# Patient Record
Sex: MALE | Race: WHITE | Employment: OTHER | URBAN - METROPOLITAN AREA
[De-identification: names, ages, dates, MRNs, and addresses within clinical notes are randomized per-mention and may not be internally consistent; named-entity substitution may affect disease eponyms.]

---

## 2017-01-10 DIAGNOSIS — E11.69 TYPE 2 DIABETES MELLITUS WITH OTHER SPECIFIED COMPLICATION (HCC): ICD-10-CM

## 2017-01-19 ENCOUNTER — TELEPHONE (OUTPATIENT)
Dept: PRIMARY CARE CLINIC | Age: 77
End: 2017-01-19

## 2017-01-20 DIAGNOSIS — D50.8 OTHER IRON DEFICIENCY ANEMIA: ICD-10-CM

## 2017-01-20 RX ORDER — LANOLIN ALCOHOL/MO/W.PET/CERES
325 CREAM (GRAM) TOPICAL
Qty: 90 TABLET | Refills: 4 | Status: SHIPPED | OUTPATIENT
Start: 2017-01-20 | End: 2018-01-25 | Stop reason: SDUPTHER

## 2017-02-14 ENCOUNTER — TELEPHONE (OUTPATIENT)
Dept: PRIMARY CARE CLINIC | Age: 77
End: 2017-02-14

## 2017-02-20 RX ORDER — ATORVASTATIN CALCIUM 80 MG/1
80 TABLET, FILM COATED ORAL DAILY
Qty: 30 TABLET | Refills: 3 | Status: SHIPPED | OUTPATIENT
Start: 2017-02-20 | End: 2017-03-01 | Stop reason: SDUPTHER

## 2017-02-27 DIAGNOSIS — I10 ESSENTIAL HYPERTENSION: ICD-10-CM

## 2017-02-28 RX ORDER — AMLODIPINE BESYLATE 2.5 MG/1
TABLET ORAL
Qty: 30 TABLET | Refills: 2 | Status: SHIPPED | OUTPATIENT
Start: 2017-02-28 | End: 2017-04-28

## 2017-03-01 ENCOUNTER — OFFICE VISIT (OUTPATIENT)
Dept: PRIMARY CARE CLINIC | Age: 77
End: 2017-03-01

## 2017-03-01 VITALS
OXYGEN SATURATION: 93 % | DIASTOLIC BLOOD PRESSURE: 65 MMHG | SYSTOLIC BLOOD PRESSURE: 119 MMHG | HEART RATE: 59 BPM | TEMPERATURE: 98 F | WEIGHT: 192 LBS | BODY MASS INDEX: 30.53 KG/M2

## 2017-03-01 DIAGNOSIS — E78.2 MIXED HYPERLIPIDEMIA: ICD-10-CM

## 2017-03-01 DIAGNOSIS — E11.69 TYPE 2 DIABETES MELLITUS WITH OTHER SPECIFIED COMPLICATION (HCC): Primary | ICD-10-CM

## 2017-03-01 DIAGNOSIS — I10 ESSENTIAL HYPERTENSION: ICD-10-CM

## 2017-03-01 DIAGNOSIS — I25.10 CORONARY ARTERY DISEASE INVOLVING NATIVE CORONARY ARTERY OF NATIVE HEART WITHOUT ANGINA PECTORIS: ICD-10-CM

## 2017-03-01 LAB — HBA1C MFR BLD: 8.3 %

## 2017-03-01 PROCEDURE — G8598 ASA/ANTIPLAT THER USED: HCPCS | Performed by: INTERNAL MEDICINE

## 2017-03-01 PROCEDURE — G8419 CALC BMI OUT NRM PARAM NOF/U: HCPCS | Performed by: INTERNAL MEDICINE

## 2017-03-01 PROCEDURE — 1123F ACP DISCUSS/DSCN MKR DOCD: CPT | Performed by: INTERNAL MEDICINE

## 2017-03-01 PROCEDURE — 4040F PNEUMOC VAC/ADMIN/RCVD: CPT | Performed by: INTERNAL MEDICINE

## 2017-03-01 PROCEDURE — 1036F TOBACCO NON-USER: CPT | Performed by: INTERNAL MEDICINE

## 2017-03-01 PROCEDURE — 83036 HEMOGLOBIN GLYCOSYLATED A1C: CPT | Performed by: INTERNAL MEDICINE

## 2017-03-01 PROCEDURE — G8427 DOCREV CUR MEDS BY ELIG CLIN: HCPCS | Performed by: INTERNAL MEDICINE

## 2017-03-01 PROCEDURE — 99214 OFFICE O/P EST MOD 30 MIN: CPT | Performed by: INTERNAL MEDICINE

## 2017-03-01 PROCEDURE — G8484 FLU IMMUNIZE NO ADMIN: HCPCS | Performed by: INTERNAL MEDICINE

## 2017-03-01 RX ORDER — METOPROLOL TARTRATE 50 MG/1
50 TABLET, FILM COATED ORAL 2 TIMES DAILY
Qty: 60 TABLET | Refills: 6 | Status: SHIPPED | OUTPATIENT
Start: 2017-03-01 | End: 2017-03-28 | Stop reason: SDUPTHER

## 2017-03-01 RX ORDER — LISINOPRIL 40 MG/1
40 TABLET ORAL DAILY
Qty: 30 TABLET | Refills: 6 | Status: SHIPPED | OUTPATIENT
Start: 2017-03-01 | End: 2017-03-28 | Stop reason: SDUPTHER

## 2017-03-01 RX ORDER — FUROSEMIDE 20 MG/1
20 TABLET ORAL SEE ADMIN INSTRUCTIONS
Qty: 90 TABLET | Refills: 3 | Status: SHIPPED | OUTPATIENT
Start: 2017-03-01 | End: 2017-03-28 | Stop reason: SDUPTHER

## 2017-03-01 RX ORDER — ATORVASTATIN CALCIUM 80 MG/1
80 TABLET, FILM COATED ORAL DAILY
Qty: 30 TABLET | Refills: 5 | Status: SHIPPED | OUTPATIENT
Start: 2017-03-01 | End: 2017-04-28 | Stop reason: SDUPTHER

## 2017-03-01 RX ORDER — ASPIRIN 81 MG/1
81 TABLET ORAL DAILY
Qty: 90 TABLET | Refills: 11 | Status: SHIPPED | OUTPATIENT
Start: 2017-03-01 | End: 2017-04-28 | Stop reason: SDUPTHER

## 2017-03-01 ASSESSMENT — ENCOUNTER SYMPTOMS
WHEEZING: 0
CHEST TIGHTNESS: 0
EYES NEGATIVE: 1
SHORTNESS OF BREATH: 0
COUGH: 0

## 2017-03-01 ASSESSMENT — PATIENT HEALTH QUESTIONNAIRE - PHQ9
SUM OF ALL RESPONSES TO PHQ QUESTIONS 1-9: 0
2. FEELING DOWN, DEPRESSED OR HOPELESS: 0
SUM OF ALL RESPONSES TO PHQ9 QUESTIONS 1 & 2: 0
1. LITTLE INTEREST OR PLEASURE IN DOING THINGS: 0

## 2017-03-28 ENCOUNTER — OFFICE VISIT (OUTPATIENT)
Dept: PRIMARY CARE CLINIC | Age: 77
End: 2017-03-28

## 2017-03-28 VITALS
OXYGEN SATURATION: 95 % | BODY MASS INDEX: 30.37 KG/M2 | DIASTOLIC BLOOD PRESSURE: 65 MMHG | WEIGHT: 191 LBS | SYSTOLIC BLOOD PRESSURE: 155 MMHG | HEART RATE: 64 BPM

## 2017-03-28 DIAGNOSIS — I10 ESSENTIAL HYPERTENSION: ICD-10-CM

## 2017-03-28 PROCEDURE — 99214 OFFICE O/P EST MOD 30 MIN: CPT | Performed by: INTERNAL MEDICINE

## 2017-03-28 PROCEDURE — 4040F PNEUMOC VAC/ADMIN/RCVD: CPT | Performed by: INTERNAL MEDICINE

## 2017-03-28 PROCEDURE — G8484 FLU IMMUNIZE NO ADMIN: HCPCS | Performed by: INTERNAL MEDICINE

## 2017-03-28 PROCEDURE — G8427 DOCREV CUR MEDS BY ELIG CLIN: HCPCS | Performed by: INTERNAL MEDICINE

## 2017-03-28 PROCEDURE — 1036F TOBACCO NON-USER: CPT | Performed by: INTERNAL MEDICINE

## 2017-03-28 PROCEDURE — G8419 CALC BMI OUT NRM PARAM NOF/U: HCPCS | Performed by: INTERNAL MEDICINE

## 2017-03-28 PROCEDURE — 1123F ACP DISCUSS/DSCN MKR DOCD: CPT | Performed by: INTERNAL MEDICINE

## 2017-03-28 PROCEDURE — G8598 ASA/ANTIPLAT THER USED: HCPCS | Performed by: INTERNAL MEDICINE

## 2017-03-28 RX ORDER — FUROSEMIDE 20 MG/1
20 TABLET ORAL SEE ADMIN INSTRUCTIONS
Qty: 90 TABLET | Refills: 3 | Status: SHIPPED | OUTPATIENT
Start: 2017-03-28 | End: 2017-04-28 | Stop reason: SDUPTHER

## 2017-03-28 RX ORDER — AMLODIPINE BESYLATE 2.5 MG/1
2.5 TABLET ORAL DAILY
Qty: 30 TABLET | Refills: 3 | Status: SHIPPED | OUTPATIENT
Start: 2017-03-28 | End: 2017-04-28 | Stop reason: SDUPTHER

## 2017-03-28 RX ORDER — METOPROLOL TARTRATE 50 MG/1
50 TABLET, FILM COATED ORAL 2 TIMES DAILY
Qty: 60 TABLET | Refills: 6 | Status: SHIPPED | OUTPATIENT
Start: 2017-03-28 | End: 2017-04-28 | Stop reason: SDUPTHER

## 2017-03-28 RX ORDER — LISINOPRIL 40 MG/1
40 TABLET ORAL DAILY
Qty: 30 TABLET | Refills: 6 | Status: SHIPPED | OUTPATIENT
Start: 2017-03-28 | End: 2017-04-28 | Stop reason: SDUPTHER

## 2017-03-28 ASSESSMENT — ENCOUNTER SYMPTOMS
EYES NEGATIVE: 1
COUGH: 0
CHEST TIGHTNESS: 0
SHORTNESS OF BREATH: 0
WHEEZING: 0

## 2017-03-31 DIAGNOSIS — I25.10 CORONARY ARTERY DISEASE INVOLVING NATIVE CORONARY ARTERY OF NATIVE HEART WITHOUT ANGINA PECTORIS: ICD-10-CM

## 2017-04-03 RX ORDER — ASPIRIN 81 MG/1
TABLET ORAL
Qty: 90 TABLET | Refills: 11 | OUTPATIENT
Start: 2017-04-03

## 2017-04-28 ENCOUNTER — OFFICE VISIT (OUTPATIENT)
Dept: PRIMARY CARE CLINIC | Age: 77
End: 2017-04-28

## 2017-04-28 VITALS
OXYGEN SATURATION: 95 % | WEIGHT: 193 LBS | HEART RATE: 57 BPM | SYSTOLIC BLOOD PRESSURE: 130 MMHG | DIASTOLIC BLOOD PRESSURE: 70 MMHG | BODY MASS INDEX: 30.68 KG/M2

## 2017-04-28 DIAGNOSIS — Z23 NEED FOR TDAP VACCINATION: ICD-10-CM

## 2017-04-28 DIAGNOSIS — I25.10 CORONARY ARTERY DISEASE INVOLVING NATIVE CORONARY ARTERY OF NATIVE HEART WITHOUT ANGINA PECTORIS: ICD-10-CM

## 2017-04-28 DIAGNOSIS — E78.2 MIXED HYPERLIPIDEMIA: ICD-10-CM

## 2017-04-28 DIAGNOSIS — I10 ESSENTIAL HYPERTENSION: ICD-10-CM

## 2017-04-28 DIAGNOSIS — E11.69 TYPE 2 DIABETES MELLITUS WITH OTHER SPECIFIED COMPLICATION (HCC): Primary | ICD-10-CM

## 2017-04-28 PROCEDURE — 90715 TDAP VACCINE 7 YRS/> IM: CPT | Performed by: INTERNAL MEDICINE

## 2017-04-28 PROCEDURE — 1123F ACP DISCUSS/DSCN MKR DOCD: CPT | Performed by: INTERNAL MEDICINE

## 2017-04-28 PROCEDURE — G8598 ASA/ANTIPLAT THER USED: HCPCS | Performed by: INTERNAL MEDICINE

## 2017-04-28 PROCEDURE — G8427 DOCREV CUR MEDS BY ELIG CLIN: HCPCS | Performed by: INTERNAL MEDICINE

## 2017-04-28 PROCEDURE — 90471 IMMUNIZATION ADMIN: CPT | Performed by: INTERNAL MEDICINE

## 2017-04-28 PROCEDURE — 1036F TOBACCO NON-USER: CPT | Performed by: INTERNAL MEDICINE

## 2017-04-28 PROCEDURE — G8419 CALC BMI OUT NRM PARAM NOF/U: HCPCS | Performed by: INTERNAL MEDICINE

## 2017-04-28 PROCEDURE — 99214 OFFICE O/P EST MOD 30 MIN: CPT | Performed by: INTERNAL MEDICINE

## 2017-04-28 PROCEDURE — 4040F PNEUMOC VAC/ADMIN/RCVD: CPT | Performed by: INTERNAL MEDICINE

## 2017-04-28 RX ORDER — ATORVASTATIN CALCIUM 80 MG/1
80 TABLET, FILM COATED ORAL DAILY
Qty: 30 TABLET | Refills: 5 | Status: SHIPPED | OUTPATIENT
Start: 2017-04-28 | End: 2017-05-30 | Stop reason: SDUPTHER

## 2017-04-28 RX ORDER — AMLODIPINE BESYLATE 2.5 MG/1
2.5 TABLET ORAL DAILY
Qty: 30 TABLET | Refills: 3 | Status: SHIPPED | OUTPATIENT
Start: 2017-04-28 | End: 2017-05-30 | Stop reason: SDUPTHER

## 2017-04-28 RX ORDER — METOPROLOL TARTRATE 50 MG/1
50 TABLET, FILM COATED ORAL 2 TIMES DAILY
Qty: 60 TABLET | Refills: 6 | Status: SHIPPED | OUTPATIENT
Start: 2017-04-28 | End: 2017-05-30 | Stop reason: SDUPTHER

## 2017-04-28 RX ORDER — LISINOPRIL 40 MG/1
40 TABLET ORAL DAILY
Qty: 30 TABLET | Refills: 6 | Status: SHIPPED | OUTPATIENT
Start: 2017-04-28 | End: 2017-05-30 | Stop reason: SDUPTHER

## 2017-04-28 RX ORDER — FUROSEMIDE 20 MG/1
20 TABLET ORAL SEE ADMIN INSTRUCTIONS
Qty: 90 TABLET | Refills: 3 | Status: SHIPPED | OUTPATIENT
Start: 2017-04-28 | End: 2017-05-30 | Stop reason: SDUPTHER

## 2017-04-28 RX ORDER — ASPIRIN 81 MG/1
81 TABLET ORAL DAILY
Qty: 90 TABLET | Refills: 11 | Status: SHIPPED | OUTPATIENT
Start: 2017-04-28 | End: 2017-05-30 | Stop reason: SDUPTHER

## 2017-04-28 ASSESSMENT — ENCOUNTER SYMPTOMS
SHORTNESS OF BREATH: 0
WHEEZING: 0
CHEST TIGHTNESS: 0
COUGH: 0
EYES NEGATIVE: 1

## 2017-05-30 ENCOUNTER — OFFICE VISIT (OUTPATIENT)
Dept: PRIMARY CARE CLINIC | Age: 77
End: 2017-05-30

## 2017-05-30 VITALS
SYSTOLIC BLOOD PRESSURE: 120 MMHG | HEIGHT: 67 IN | DIASTOLIC BLOOD PRESSURE: 70 MMHG | WEIGHT: 192 LBS | BODY MASS INDEX: 30.13 KG/M2 | RESPIRATION RATE: 16 BRPM | TEMPERATURE: 98 F | HEART RATE: 60 BPM | OXYGEN SATURATION: 97 %

## 2017-05-30 DIAGNOSIS — I25.10 CORONARY ARTERY DISEASE INVOLVING NATIVE CORONARY ARTERY OF NATIVE HEART WITHOUT ANGINA PECTORIS: ICD-10-CM

## 2017-05-30 DIAGNOSIS — E11.69 TYPE 2 DIABETES MELLITUS WITH OTHER SPECIFIED COMPLICATION (HCC): Primary | ICD-10-CM

## 2017-05-30 DIAGNOSIS — E78.2 MIXED HYPERLIPIDEMIA: ICD-10-CM

## 2017-05-30 DIAGNOSIS — D64.9 ANEMIA, UNSPECIFIED TYPE: ICD-10-CM

## 2017-05-30 DIAGNOSIS — I10 ESSENTIAL HYPERTENSION: ICD-10-CM

## 2017-05-30 LAB — HBA1C MFR BLD: 8.2 %

## 2017-05-30 PROCEDURE — G8427 DOCREV CUR MEDS BY ELIG CLIN: HCPCS | Performed by: INTERNAL MEDICINE

## 2017-05-30 PROCEDURE — 1123F ACP DISCUSS/DSCN MKR DOCD: CPT | Performed by: INTERNAL MEDICINE

## 2017-05-30 PROCEDURE — 83036 HEMOGLOBIN GLYCOSYLATED A1C: CPT | Performed by: INTERNAL MEDICINE

## 2017-05-30 PROCEDURE — G8417 CALC BMI ABV UP PARAM F/U: HCPCS | Performed by: INTERNAL MEDICINE

## 2017-05-30 PROCEDURE — 99214 OFFICE O/P EST MOD 30 MIN: CPT | Performed by: INTERNAL MEDICINE

## 2017-05-30 PROCEDURE — 1036F TOBACCO NON-USER: CPT | Performed by: INTERNAL MEDICINE

## 2017-05-30 PROCEDURE — G8598 ASA/ANTIPLAT THER USED: HCPCS | Performed by: INTERNAL MEDICINE

## 2017-05-30 PROCEDURE — 4040F PNEUMOC VAC/ADMIN/RCVD: CPT | Performed by: INTERNAL MEDICINE

## 2017-05-30 RX ORDER — ATORVASTATIN CALCIUM 80 MG/1
80 TABLET, FILM COATED ORAL DAILY
Qty: 30 TABLET | Refills: 6 | Status: SHIPPED | OUTPATIENT
Start: 2017-05-30 | End: 2017-09-14 | Stop reason: SDUPTHER

## 2017-05-30 RX ORDER — METOPROLOL TARTRATE 50 MG/1
50 TABLET, FILM COATED ORAL 2 TIMES DAILY
Qty: 60 TABLET | Refills: 6 | Status: SHIPPED | OUTPATIENT
Start: 2017-05-30 | End: 2017-09-14 | Stop reason: SDUPTHER

## 2017-05-30 RX ORDER — FUROSEMIDE 20 MG/1
20 TABLET ORAL SEE ADMIN INSTRUCTIONS
Qty: 90 TABLET | Refills: 3 | Status: SHIPPED | OUTPATIENT
Start: 2017-05-30 | End: 2017-09-14 | Stop reason: SDUPTHER

## 2017-05-30 RX ORDER — AMLODIPINE BESYLATE 2.5 MG/1
2.5 TABLET ORAL DAILY
Qty: 30 TABLET | Refills: 3 | Status: SHIPPED | OUTPATIENT
Start: 2017-05-30 | End: 2017-09-14 | Stop reason: SDUPTHER

## 2017-05-30 RX ORDER — ASPIRIN 81 MG/1
81 TABLET ORAL DAILY
Qty: 90 TABLET | Refills: 11 | Status: SHIPPED | OUTPATIENT
Start: 2017-05-30 | End: 2017-09-14 | Stop reason: SDUPTHER

## 2017-05-30 RX ORDER — LISINOPRIL 40 MG/1
40 TABLET ORAL DAILY
Qty: 30 TABLET | Refills: 6 | Status: SHIPPED | OUTPATIENT
Start: 2017-05-30 | End: 2017-09-14 | Stop reason: SDUPTHER

## 2017-05-30 ASSESSMENT — ENCOUNTER SYMPTOMS
CHEST TIGHTNESS: 0
SHORTNESS OF BREATH: 0
WHEEZING: 0
COUGH: 0
EYES NEGATIVE: 1

## 2017-06-05 DIAGNOSIS — E11.69 TYPE 2 DIABETES MELLITUS WITH OTHER SPECIFIED COMPLICATION (HCC): ICD-10-CM

## 2017-06-05 DIAGNOSIS — E78.2 MIXED HYPERLIPIDEMIA: ICD-10-CM

## 2017-06-05 LAB
A/G RATIO: 1.1 (ref 1.1–2.2)
ALBUMIN SERPL-MCNC: 3.9 G/DL (ref 3.4–5)
ALP BLD-CCNC: 69 U/L (ref 40–129)
ALT SERPL-CCNC: 13 U/L (ref 10–40)
ANION GAP SERPL CALCULATED.3IONS-SCNC: 13 MMOL/L (ref 3–16)
AST SERPL-CCNC: 15 U/L (ref 15–37)
BASOPHILS ABSOLUTE: 0.1 K/UL (ref 0–0.2)
BASOPHILS RELATIVE PERCENT: 1.1 %
BILIRUB SERPL-MCNC: 0.3 MG/DL (ref 0–1)
BILIRUBIN URINE: ABNORMAL
BLOOD, URINE: NEGATIVE
BUN BLDV-MCNC: 27 MG/DL (ref 7–20)
CALCIUM SERPL-MCNC: 9.3 MG/DL (ref 8.3–10.6)
CHLORIDE BLD-SCNC: 99 MMOL/L (ref 99–110)
CHOLESTEROL, TOTAL: 138 MG/DL (ref 0–199)
CLARITY: CLEAR
CO2: 31 MMOL/L (ref 21–32)
COLOR: YELLOW
CREAT SERPL-MCNC: 1.4 MG/DL (ref 0.8–1.3)
EOSINOPHILS ABSOLUTE: 0.4 K/UL (ref 0–0.6)
EOSINOPHILS RELATIVE PERCENT: 3 %
EPITHELIAL CELLS, UA: 0 /HPF (ref 0–5)
GFR AFRICAN AMERICAN: 59
GFR NON-AFRICAN AMERICAN: 49
GLOBULIN: 3.4 G/DL
GLUCOSE BLD-MCNC: 134 MG/DL (ref 70–99)
GLUCOSE URINE: NEGATIVE MG/DL
HCT VFR BLD CALC: 44.4 % (ref 40.5–52.5)
HDLC SERPL-MCNC: 40 MG/DL (ref 40–60)
HEMOGLOBIN: 13.8 G/DL (ref 13.5–17.5)
HYALINE CASTS: 0 /LPF (ref 0–8)
KETONES, URINE: ABNORMAL MG/DL
LDL CHOLESTEROL CALCULATED: 77 MG/DL
LEUKOCYTE ESTERASE, URINE: NEGATIVE
LYMPHOCYTES ABSOLUTE: 1.7 K/UL (ref 1–5.1)
LYMPHOCYTES RELATIVE PERCENT: 13.4 %
MCH RBC QN AUTO: 26.8 PG (ref 26–34)
MCHC RBC AUTO-ENTMCNC: 31.1 G/DL (ref 31–36)
MCV RBC AUTO: 86.2 FL (ref 80–100)
MICROSCOPIC EXAMINATION: YES
MONOCYTES ABSOLUTE: 1 K/UL (ref 0–1.3)
MONOCYTES RELATIVE PERCENT: 7.6 %
NEUTROPHILS ABSOLUTE: 9.4 K/UL (ref 1.7–7.7)
NEUTROPHILS RELATIVE PERCENT: 74.9 %
NITRITE, URINE: NEGATIVE
PDW BLD-RTO: 16.2 % (ref 12.4–15.4)
PH UA: 7
PLATELET # BLD: 221 K/UL (ref 135–450)
PMV BLD AUTO: 9.1 FL (ref 5–10.5)
POTASSIUM SERPL-SCNC: 4.3 MMOL/L (ref 3.5–5.1)
PROTEIN UA: 100 MG/DL
RBC # BLD: 5.16 M/UL (ref 4.2–5.9)
RBC UA: 3 /HPF (ref 0–4)
SODIUM BLD-SCNC: 143 MMOL/L (ref 136–145)
SPECIFIC GRAVITY UA: 1.02
TOTAL PROTEIN: 7.3 G/DL (ref 6.4–8.2)
TRIGL SERPL-MCNC: 103 MG/DL (ref 0–150)
TSH SERPL DL<=0.05 MIU/L-ACNC: 2.35 UIU/ML (ref 0.27–4.2)
URINE TYPE: ABNORMAL
UROBILINOGEN, URINE: 1 E.U./DL
VLDLC SERPL CALC-MCNC: 21 MG/DL
WBC # BLD: 12.6 K/UL (ref 4–11)
WBC UA: 1 /HPF (ref 0–5)

## 2017-08-08 DIAGNOSIS — E11.69 TYPE 2 DIABETES MELLITUS WITH OTHER SPECIFIED COMPLICATION (HCC): ICD-10-CM

## 2017-09-14 ENCOUNTER — OFFICE VISIT (OUTPATIENT)
Dept: PRIMARY CARE CLINIC | Age: 77
End: 2017-09-14

## 2017-09-14 VITALS
TEMPERATURE: 98.5 F | OXYGEN SATURATION: 95 % | HEIGHT: 67 IN | RESPIRATION RATE: 16 BRPM | DIASTOLIC BLOOD PRESSURE: 65 MMHG | SYSTOLIC BLOOD PRESSURE: 125 MMHG | WEIGHT: 190 LBS | HEART RATE: 61 BPM | BODY MASS INDEX: 29.82 KG/M2

## 2017-09-14 DIAGNOSIS — I10 ESSENTIAL HYPERTENSION: ICD-10-CM

## 2017-09-14 DIAGNOSIS — I25.10 CORONARY ARTERY DISEASE INVOLVING NATIVE CORONARY ARTERY OF NATIVE HEART WITHOUT ANGINA PECTORIS: ICD-10-CM

## 2017-09-14 DIAGNOSIS — M15.9 GENERALIZED OA: ICD-10-CM

## 2017-09-14 DIAGNOSIS — E11.69 TYPE 2 DIABETES MELLITUS WITH OTHER SPECIFIED COMPLICATION (HCC): Primary | ICD-10-CM

## 2017-09-14 DIAGNOSIS — E78.2 MIXED HYPERLIPIDEMIA: ICD-10-CM

## 2017-09-14 DIAGNOSIS — Z23 FLU VACCINE NEED: ICD-10-CM

## 2017-09-14 LAB — HBA1C MFR BLD: 8 %

## 2017-09-14 PROCEDURE — 83036 HEMOGLOBIN GLYCOSYLATED A1C: CPT | Performed by: INTERNAL MEDICINE

## 2017-09-14 PROCEDURE — G8417 CALC BMI ABV UP PARAM F/U: HCPCS | Performed by: INTERNAL MEDICINE

## 2017-09-14 PROCEDURE — 1123F ACP DISCUSS/DSCN MKR DOCD: CPT | Performed by: INTERNAL MEDICINE

## 2017-09-14 PROCEDURE — 1036F TOBACCO NON-USER: CPT | Performed by: INTERNAL MEDICINE

## 2017-09-14 PROCEDURE — 4040F PNEUMOC VAC/ADMIN/RCVD: CPT | Performed by: INTERNAL MEDICINE

## 2017-09-14 PROCEDURE — 90662 IIV NO PRSV INCREASED AG IM: CPT | Performed by: INTERNAL MEDICINE

## 2017-09-14 PROCEDURE — G8598 ASA/ANTIPLAT THER USED: HCPCS | Performed by: INTERNAL MEDICINE

## 2017-09-14 PROCEDURE — G0008 ADMIN INFLUENZA VIRUS VAC: HCPCS | Performed by: INTERNAL MEDICINE

## 2017-09-14 PROCEDURE — G8427 DOCREV CUR MEDS BY ELIG CLIN: HCPCS | Performed by: INTERNAL MEDICINE

## 2017-09-14 PROCEDURE — 99214 OFFICE O/P EST MOD 30 MIN: CPT | Performed by: INTERNAL MEDICINE

## 2017-09-14 RX ORDER — FUROSEMIDE 20 MG/1
20 TABLET ORAL SEE ADMIN INSTRUCTIONS
Qty: 90 TABLET | Refills: 3 | Status: SHIPPED | OUTPATIENT
Start: 2017-09-14 | End: 2017-12-14 | Stop reason: SDUPTHER

## 2017-09-14 RX ORDER — ACETAMINOPHEN 500 MG
500 TABLET ORAL EVERY 6 HOURS PRN
Qty: 90 TABLET | Refills: 3 | Status: SHIPPED | OUTPATIENT
Start: 2017-09-14 | End: 2018-02-18 | Stop reason: SDUPTHER

## 2017-09-14 RX ORDER — METOPROLOL TARTRATE 50 MG/1
50 TABLET, FILM COATED ORAL 2 TIMES DAILY
Qty: 60 TABLET | Refills: 6 | Status: SHIPPED | OUTPATIENT
Start: 2017-09-14 | End: 2017-12-14 | Stop reason: SDUPTHER

## 2017-09-14 RX ORDER — ATORVASTATIN CALCIUM 80 MG/1
80 TABLET, FILM COATED ORAL DAILY
Qty: 30 TABLET | Refills: 6 | Status: SHIPPED | OUTPATIENT
Start: 2017-09-14 | End: 2017-12-14 | Stop reason: SDUPTHER

## 2017-09-14 RX ORDER — LISINOPRIL 40 MG/1
40 TABLET ORAL DAILY
Qty: 30 TABLET | Refills: 6 | Status: SHIPPED | OUTPATIENT
Start: 2017-09-14 | End: 2017-12-14 | Stop reason: SDUPTHER

## 2017-09-14 RX ORDER — ASPIRIN 81 MG/1
81 TABLET ORAL DAILY
Qty: 90 TABLET | Refills: 11 | Status: SHIPPED | OUTPATIENT
Start: 2017-09-14 | End: 2017-12-14 | Stop reason: SDUPTHER

## 2017-09-14 RX ORDER — AMLODIPINE BESYLATE 2.5 MG/1
2.5 TABLET ORAL DAILY
Qty: 30 TABLET | Refills: 5 | Status: SHIPPED | OUTPATIENT
Start: 2017-09-14 | End: 2017-12-14 | Stop reason: SDUPTHER

## 2017-09-14 ASSESSMENT — ENCOUNTER SYMPTOMS
CHEST TIGHTNESS: 0
COUGH: 0
BACK PAIN: 1
SHORTNESS OF BREATH: 0
WHEEZING: 0
EYES NEGATIVE: 1

## 2017-09-26 RX ORDER — PEN NEEDLE, DIABETIC 31 GX5/16"
NEEDLE, DISPOSABLE MISCELLANEOUS
Qty: 100 EACH | Refills: 12 | Status: SHIPPED | OUTPATIENT
Start: 2017-09-26 | End: 2017-11-22 | Stop reason: SDUPTHER

## 2017-12-14 ENCOUNTER — OFFICE VISIT (OUTPATIENT)
Dept: PRIMARY CARE CLINIC | Age: 77
End: 2017-12-14

## 2017-12-14 VITALS
DIASTOLIC BLOOD PRESSURE: 65 MMHG | HEART RATE: 57 BPM | OXYGEN SATURATION: 100 % | HEIGHT: 67 IN | TEMPERATURE: 97.3 F | SYSTOLIC BLOOD PRESSURE: 135 MMHG | BODY MASS INDEX: 29.98 KG/M2 | WEIGHT: 191 LBS

## 2017-12-14 DIAGNOSIS — B35.1 ONYCHOMYCOSIS: ICD-10-CM

## 2017-12-14 DIAGNOSIS — E78.2 MIXED HYPERLIPIDEMIA: ICD-10-CM

## 2017-12-14 DIAGNOSIS — E11.9 TYPE 2 DIABETES MELLITUS WITHOUT COMPLICATION, WITH LONG-TERM CURRENT USE OF INSULIN (HCC): Primary | ICD-10-CM

## 2017-12-14 DIAGNOSIS — Z79.4 TYPE 2 DIABETES MELLITUS WITHOUT COMPLICATION, WITH LONG-TERM CURRENT USE OF INSULIN (HCC): Primary | ICD-10-CM

## 2017-12-14 DIAGNOSIS — I25.10 CORONARY ARTERY DISEASE INVOLVING NATIVE CORONARY ARTERY OF NATIVE HEART WITHOUT ANGINA PECTORIS: ICD-10-CM

## 2017-12-14 DIAGNOSIS — I10 ESSENTIAL HYPERTENSION: ICD-10-CM

## 2017-12-14 LAB — HBA1C MFR BLD: 7.1 %

## 2017-12-14 PROCEDURE — 4040F PNEUMOC VAC/ADMIN/RCVD: CPT | Performed by: INTERNAL MEDICINE

## 2017-12-14 PROCEDURE — 1036F TOBACCO NON-USER: CPT | Performed by: INTERNAL MEDICINE

## 2017-12-14 PROCEDURE — 1123F ACP DISCUSS/DSCN MKR DOCD: CPT | Performed by: INTERNAL MEDICINE

## 2017-12-14 PROCEDURE — G8417 CALC BMI ABV UP PARAM F/U: HCPCS | Performed by: INTERNAL MEDICINE

## 2017-12-14 PROCEDURE — G8598 ASA/ANTIPLAT THER USED: HCPCS | Performed by: INTERNAL MEDICINE

## 2017-12-14 PROCEDURE — G8427 DOCREV CUR MEDS BY ELIG CLIN: HCPCS | Performed by: INTERNAL MEDICINE

## 2017-12-14 PROCEDURE — 83036 HEMOGLOBIN GLYCOSYLATED A1C: CPT | Performed by: INTERNAL MEDICINE

## 2017-12-14 PROCEDURE — G8484 FLU IMMUNIZE NO ADMIN: HCPCS | Performed by: INTERNAL MEDICINE

## 2017-12-14 PROCEDURE — 99214 OFFICE O/P EST MOD 30 MIN: CPT | Performed by: INTERNAL MEDICINE

## 2017-12-14 RX ORDER — FUROSEMIDE 20 MG/1
20 TABLET ORAL SEE ADMIN INSTRUCTIONS
Qty: 90 TABLET | Refills: 3 | Status: SHIPPED | OUTPATIENT
Start: 2017-12-14 | End: 2018-07-17 | Stop reason: SDUPTHER

## 2017-12-14 RX ORDER — ATORVASTATIN CALCIUM 80 MG/1
80 TABLET, FILM COATED ORAL DAILY
Qty: 30 TABLET | Refills: 6 | Status: SHIPPED | OUTPATIENT
Start: 2017-12-14 | End: 2018-07-17 | Stop reason: SDUPTHER

## 2017-12-14 RX ORDER — AMLODIPINE BESYLATE 2.5 MG/1
2.5 TABLET ORAL DAILY
Qty: 30 TABLET | Refills: 5 | Status: SHIPPED | OUTPATIENT
Start: 2017-12-14 | End: 2018-10-17

## 2017-12-14 RX ORDER — METOPROLOL TARTRATE 50 MG/1
50 TABLET, FILM COATED ORAL 2 TIMES DAILY
Qty: 60 TABLET | Refills: 6 | Status: SHIPPED | OUTPATIENT
Start: 2017-12-14 | End: 2018-07-17 | Stop reason: SDUPTHER

## 2017-12-14 RX ORDER — LISINOPRIL 40 MG/1
40 TABLET ORAL DAILY
Qty: 30 TABLET | Refills: 6 | Status: SHIPPED | OUTPATIENT
Start: 2017-12-14 | End: 2018-07-17 | Stop reason: SDUPTHER

## 2017-12-14 RX ORDER — ASPIRIN 81 MG/1
81 TABLET ORAL DAILY
Qty: 90 TABLET | Refills: 11 | Status: SHIPPED | OUTPATIENT
Start: 2017-12-14 | End: 2018-07-17 | Stop reason: SDUPTHER

## 2017-12-14 ASSESSMENT — ENCOUNTER SYMPTOMS
CHEST TIGHTNESS: 0
COUGH: 0
WHEEZING: 0
EYES NEGATIVE: 1
SHORTNESS OF BREATH: 0

## 2017-12-14 NOTE — PROGRESS NOTES
Subjective:      Patient ID: Immanuel Pacheco is a 65 y.o. male. 12/14/17 Patient presents with:  Diabetes  No low sugars! Takes pm snack reg   Hypertension  No CP / SOB               Last seen  9/14/17       Diabetes   He presents for his follow-up diabetic visit. He has type 2 diabetes mellitus. His disease course has been fluctuating. Pertinent negatives for hypoglycemia include no headaches or tremors. Pertinent negatives for diabetes include no chest pain, no fatigue and no weakness. Eye exam is current. Hypertension   This is a chronic problem. The problem has been waxing and waning since onset. Associated symptoms include anxiety. Pertinent negatives include no chest pain, headaches, palpitations or shortness of breath. Review of Systems   Constitutional: Negative for chills, fatigue and fever. Pneumovac 1/12; Prevnar 1/16   Flu vac 9/17  Zostavac/ script   Tdap 4/17   HENT:        Upper Dentures   Eyes: Negative. Eye exam  4/17   Respiratory: Negative for cough, chest tightness, shortness of breath and wheezing. Does not smoke ; No Etoh    Cardiovascular: Negative. Negative for chest pain and palpitations. Known CAD ; S/P CABG   Gastrointestinal:        Colonoscopy 12/2010 . Polyps . C/o Dr Joie Gil   Endocrine:         Diabetic    Genitourinary: Negative for dysuria. Musculoskeletal: Positive for arthralgias. Skin: Negative for rash. Allergic/Immunologic: Negative for food allergies. Neurological: Negative for tremors, weakness and headaches. Psychiatric/Behavioral: Negative for sleep disturbance. Objective:   Physical Exam   Constitutional: He is oriented to person, place, and time. He appears well-nourished. No distress. Eyes: Conjunctivae are normal.   Neck: Neck supple. Cardiovascular: Normal rate, regular rhythm and normal heart sounds. Pulmonary/Chest: Breath sounds normal. He has no wheezes. He has no rales.    Abdominal: He exhibits

## 2018-01-25 DIAGNOSIS — D50.8 OTHER IRON DEFICIENCY ANEMIA: ICD-10-CM

## 2018-01-25 RX ORDER — FERROUS SULFATE 325(65) MG
325 TABLET ORAL
Qty: 90 TABLET | Refills: 3 | Status: SHIPPED | OUTPATIENT
Start: 2018-01-25 | End: 2018-10-17 | Stop reason: SDUPTHER

## 2018-02-18 DIAGNOSIS — M15.9 GENERALIZED OA: ICD-10-CM

## 2018-02-20 RX ORDER — GLYCERIN ADULT
500 SUPPOSITORY, RECTAL RECTAL EVERY 6 HOURS PRN
Qty: 90 TABLET | Refills: 3 | Status: SHIPPED | OUTPATIENT
Start: 2018-02-20 | End: 2018-06-25 | Stop reason: SDUPTHER

## 2018-04-17 ENCOUNTER — OFFICE VISIT (OUTPATIENT)
Dept: PRIMARY CARE CLINIC | Age: 78
End: 2018-04-17

## 2018-04-17 VITALS
HEART RATE: 60 BPM | DIASTOLIC BLOOD PRESSURE: 60 MMHG | OXYGEN SATURATION: 98 % | TEMPERATURE: 98.5 F | WEIGHT: 192 LBS | BODY MASS INDEX: 30.13 KG/M2 | SYSTOLIC BLOOD PRESSURE: 130 MMHG | HEIGHT: 67 IN

## 2018-04-17 DIAGNOSIS — I10 ESSENTIAL HYPERTENSION: ICD-10-CM

## 2018-04-17 DIAGNOSIS — E11.9 TYPE 2 DIABETES MELLITUS WITHOUT COMPLICATION, WITH LONG-TERM CURRENT USE OF INSULIN (HCC): Primary | ICD-10-CM

## 2018-04-17 DIAGNOSIS — Z79.4 TYPE 2 DIABETES MELLITUS WITHOUT COMPLICATION, WITH LONG-TERM CURRENT USE OF INSULIN (HCC): Primary | ICD-10-CM

## 2018-04-17 DIAGNOSIS — Z23 NEED FOR SHINGLES VACCINE: ICD-10-CM

## 2018-04-17 LAB — HBA1C MFR BLD: 7.3 %

## 2018-04-17 PROCEDURE — G8417 CALC BMI ABV UP PARAM F/U: HCPCS | Performed by: INTERNAL MEDICINE

## 2018-04-17 PROCEDURE — G8427 DOCREV CUR MEDS BY ELIG CLIN: HCPCS | Performed by: INTERNAL MEDICINE

## 2018-04-17 PROCEDURE — 99214 OFFICE O/P EST MOD 30 MIN: CPT | Performed by: INTERNAL MEDICINE

## 2018-04-17 PROCEDURE — 83036 HEMOGLOBIN GLYCOSYLATED A1C: CPT | Performed by: INTERNAL MEDICINE

## 2018-04-17 PROCEDURE — 4040F PNEUMOC VAC/ADMIN/RCVD: CPT | Performed by: INTERNAL MEDICINE

## 2018-04-17 PROCEDURE — 1123F ACP DISCUSS/DSCN MKR DOCD: CPT | Performed by: INTERNAL MEDICINE

## 2018-04-17 PROCEDURE — 1036F TOBACCO NON-USER: CPT | Performed by: INTERNAL MEDICINE

## 2018-04-17 PROCEDURE — G8598 ASA/ANTIPLAT THER USED: HCPCS | Performed by: INTERNAL MEDICINE

## 2018-04-17 ASSESSMENT — ENCOUNTER SYMPTOMS
CHEST TIGHTNESS: 0
EYES NEGATIVE: 1
COUGH: 0
SHORTNESS OF BREATH: 0
WHEEZING: 0

## 2018-04-18 DIAGNOSIS — I10 ESSENTIAL HYPERTENSION: ICD-10-CM

## 2018-04-18 RX ORDER — AMLODIPINE BESYLATE 2.5 MG/1
2.5 TABLET ORAL DAILY
Qty: 30 TABLET | Refills: 5 | Status: SHIPPED | OUTPATIENT
Start: 2018-04-18 | End: 2018-07-17 | Stop reason: SDUPTHER

## 2018-06-25 DIAGNOSIS — M15.9 GENERALIZED OA: ICD-10-CM

## 2018-06-25 RX ORDER — GLYCERIN ADULT
500 SUPPOSITORY, RECTAL RECTAL EVERY 6 HOURS PRN
Qty: 90 TABLET | Refills: 3 | Status: SHIPPED | OUTPATIENT
Start: 2018-06-25 | End: 2018-09-21 | Stop reason: SDUPTHER

## 2018-07-17 ENCOUNTER — OFFICE VISIT (OUTPATIENT)
Dept: PRIMARY CARE CLINIC | Age: 78
End: 2018-07-17

## 2018-07-17 VITALS
SYSTOLIC BLOOD PRESSURE: 125 MMHG | HEART RATE: 62 BPM | OXYGEN SATURATION: 98 % | BODY MASS INDEX: 30.29 KG/M2 | WEIGHT: 193 LBS | HEIGHT: 67 IN | DIASTOLIC BLOOD PRESSURE: 65 MMHG | TEMPERATURE: 98.9 F

## 2018-07-17 DIAGNOSIS — I25.10 CORONARY ARTERY DISEASE INVOLVING NATIVE CORONARY ARTERY OF NATIVE HEART WITHOUT ANGINA PECTORIS: ICD-10-CM

## 2018-07-17 DIAGNOSIS — I10 ESSENTIAL HYPERTENSION: ICD-10-CM

## 2018-07-17 DIAGNOSIS — Z79.4 TYPE 2 DIABETES MELLITUS WITHOUT COMPLICATION, WITH LONG-TERM CURRENT USE OF INSULIN (HCC): Primary | ICD-10-CM

## 2018-07-17 DIAGNOSIS — E78.2 MIXED HYPERLIPIDEMIA: ICD-10-CM

## 2018-07-17 DIAGNOSIS — I49.8 SINUS ARRHYTHMIA: ICD-10-CM

## 2018-07-17 DIAGNOSIS — E11.9 TYPE 2 DIABETES MELLITUS WITHOUT COMPLICATION, WITH LONG-TERM CURRENT USE OF INSULIN (HCC): Primary | ICD-10-CM

## 2018-07-17 LAB — HBA1C MFR BLD: 7.8 %

## 2018-07-17 PROCEDURE — 1101F PT FALLS ASSESS-DOCD LE1/YR: CPT | Performed by: INTERNAL MEDICINE

## 2018-07-17 PROCEDURE — G8417 CALC BMI ABV UP PARAM F/U: HCPCS | Performed by: INTERNAL MEDICINE

## 2018-07-17 PROCEDURE — 1123F ACP DISCUSS/DSCN MKR DOCD: CPT | Performed by: INTERNAL MEDICINE

## 2018-07-17 PROCEDURE — 99214 OFFICE O/P EST MOD 30 MIN: CPT | Performed by: INTERNAL MEDICINE

## 2018-07-17 PROCEDURE — G8598 ASA/ANTIPLAT THER USED: HCPCS | Performed by: INTERNAL MEDICINE

## 2018-07-17 PROCEDURE — G8427 DOCREV CUR MEDS BY ELIG CLIN: HCPCS | Performed by: INTERNAL MEDICINE

## 2018-07-17 PROCEDURE — 4040F PNEUMOC VAC/ADMIN/RCVD: CPT | Performed by: INTERNAL MEDICINE

## 2018-07-17 PROCEDURE — 83036 HEMOGLOBIN GLYCOSYLATED A1C: CPT | Performed by: INTERNAL MEDICINE

## 2018-07-17 PROCEDURE — 1036F TOBACCO NON-USER: CPT | Performed by: INTERNAL MEDICINE

## 2018-07-17 RX ORDER — ASPIRIN 81 MG/1
81 TABLET ORAL DAILY
Qty: 90 TABLET | Refills: 11 | Status: SHIPPED | OUTPATIENT
Start: 2018-07-17 | End: 2018-10-17 | Stop reason: SDUPTHER

## 2018-07-17 RX ORDER — AMLODIPINE BESYLATE 2.5 MG/1
2.5 TABLET ORAL DAILY
Qty: 30 TABLET | Refills: 5 | Status: SHIPPED | OUTPATIENT
Start: 2018-07-17 | End: 2018-10-17 | Stop reason: SDUPTHER

## 2018-07-17 RX ORDER — FUROSEMIDE 20 MG/1
20 TABLET ORAL SEE ADMIN INSTRUCTIONS
Qty: 90 TABLET | Refills: 3 | Status: SHIPPED | OUTPATIENT
Start: 2018-07-17 | End: 2018-10-17 | Stop reason: SDUPTHER

## 2018-07-17 RX ORDER — ATORVASTATIN CALCIUM 80 MG/1
80 TABLET, FILM COATED ORAL DAILY
Qty: 30 TABLET | Refills: 6 | Status: SHIPPED | OUTPATIENT
Start: 2018-07-17 | End: 2018-10-17 | Stop reason: SDUPTHER

## 2018-07-17 RX ORDER — METOPROLOL TARTRATE 50 MG/1
50 TABLET, FILM COATED ORAL 2 TIMES DAILY
Qty: 60 TABLET | Refills: 6 | Status: SHIPPED | OUTPATIENT
Start: 2018-07-17 | End: 2018-10-17 | Stop reason: SDUPTHER

## 2018-07-17 RX ORDER — LISINOPRIL 40 MG/1
40 TABLET ORAL DAILY
Qty: 30 TABLET | Refills: 6 | Status: SHIPPED | OUTPATIENT
Start: 2018-07-17 | End: 2018-10-17 | Stop reason: SDUPTHER

## 2018-07-17 ASSESSMENT — ENCOUNTER SYMPTOMS
EYES NEGATIVE: 1
CHEST TIGHTNESS: 0
SHORTNESS OF BREATH: 1
WHEEZING: 0
COUGH: 0

## 2018-07-17 NOTE — PROGRESS NOTES
Subjective:      Patient ID: Alvarez Rojas is a 65 y.o. male. 7/17/18 Patient presents with:  Diabetes: will get a little low sometime . usuallt sugars good . sees podiatrist reg too   Hypertension: no complaints   Check-Up              Last seen  4/17/18 Patient presents with:  Diabetes  Hypertension            Diabetes   He presents for his follow-up diabetic visit. He has type 2 diabetes mellitus. His disease course has been fluctuating. Pertinent negatives for hypoglycemia include no headaches or tremors. Pertinent negatives for diabetes include no chest pain, no fatigue and no weakness. Eye exam is current. Hypertension   This is a chronic problem. The problem has been waxing and waning since onset. Associated symptoms include anxiety and shortness of breath (little off and on ). Pertinent negatives include no chest pain, headaches or palpitations. Review of Systems   Constitutional: Negative for chills, fatigue and fever. Pneumovac 1/12; Prevnar 1/16   Flu vac 9/17  Zostavac / 6/17 at [facility]  Tdap 4/17   HENT:        Upper Dentures   Eyes: Negative. Eye exam  3/18   Respiratory: Positive for shortness of breath (little off and on ). Negative for cough, chest tightness and wheezing. Does not smoke ; No Etoh    Cardiovascular: Negative. Negative for chest pain and palpitations. Known CAD ; S/P CABG   Gastrointestinal:        Colonoscopy 12/2010 . Polyps . C/o Dr Fiona Dougherty   Endocrine:         Diabetic    Genitourinary: Negative for dysuria. Musculoskeletal: Positive for arthralgias. Saw Podiatrist  7/18   Skin: Negative for rash. Allergic/Immunologic: Negative for food allergies. Neurological: Negative for tremors, weakness and headaches. Psychiatric/Behavioral: Negative for sleep disturbance. Objective:   Physical Exam   Constitutional: He is oriented to person, place, and time. No distress.    HENT:   Mouth/Throat: Oropharynx is clear and moist.

## 2018-08-09 DIAGNOSIS — E11.9 TYPE 2 DIABETES MELLITUS WITHOUT COMPLICATION, WITH LONG-TERM CURRENT USE OF INSULIN (HCC): ICD-10-CM

## 2018-08-09 DIAGNOSIS — Z79.4 TYPE 2 DIABETES MELLITUS WITHOUT COMPLICATION, WITH LONG-TERM CURRENT USE OF INSULIN (HCC): ICD-10-CM

## 2018-08-09 LAB
A/G RATIO: 1.4 (ref 1.1–2.2)
ALBUMIN SERPL-MCNC: 4 G/DL (ref 3.4–5)
ALP BLD-CCNC: 82 U/L (ref 40–129)
ALT SERPL-CCNC: 15 U/L (ref 10–40)
ANION GAP SERPL CALCULATED.3IONS-SCNC: 13 MMOL/L (ref 3–16)
AST SERPL-CCNC: 16 U/L (ref 15–37)
BASOPHILS ABSOLUTE: 0.1 K/UL (ref 0–0.2)
BASOPHILS RELATIVE PERCENT: 0.9 %
BILIRUB SERPL-MCNC: 0.4 MG/DL (ref 0–1)
BILIRUBIN URINE: NEGATIVE
BLOOD, URINE: NEGATIVE
BUN BLDV-MCNC: 16 MG/DL (ref 7–20)
CALCIUM SERPL-MCNC: 8.9 MG/DL (ref 8.3–10.6)
CHLORIDE BLD-SCNC: 101 MMOL/L (ref 99–110)
CHOLESTEROL, TOTAL: 148 MG/DL (ref 0–199)
CLARITY: CLEAR
CO2: 27 MMOL/L (ref 21–32)
COLOR: YELLOW
CREAT SERPL-MCNC: 1 MG/DL (ref 0.8–1.3)
EOSINOPHILS ABSOLUTE: 0.3 K/UL (ref 0–0.6)
EOSINOPHILS RELATIVE PERCENT: 3.2 %
EPITHELIAL CELLS, UA: 0 /HPF (ref 0–5)
GFR AFRICAN AMERICAN: >60
GFR NON-AFRICAN AMERICAN: >60
GLOBULIN: 2.9 G/DL
GLUCOSE BLD-MCNC: 101 MG/DL (ref 70–99)
GLUCOSE URINE: NEGATIVE MG/DL
HCT VFR BLD CALC: 43.4 % (ref 40.5–52.5)
HDLC SERPL-MCNC: 37 MG/DL (ref 40–60)
HEMOGLOBIN: 14 G/DL (ref 13.5–17.5)
HYALINE CASTS: 0 /LPF (ref 0–8)
KETONES, URINE: NEGATIVE MG/DL
LDL CHOLESTEROL CALCULATED: 85 MG/DL
LEUKOCYTE ESTERASE, URINE: NEGATIVE
LYMPHOCYTES ABSOLUTE: 2.1 K/UL (ref 1–5.1)
LYMPHOCYTES RELATIVE PERCENT: 19.7 %
MCH RBC QN AUTO: 28.5 PG (ref 26–34)
MCHC RBC AUTO-ENTMCNC: 32.3 G/DL (ref 31–36)
MCV RBC AUTO: 88.2 FL (ref 80–100)
MICROSCOPIC EXAMINATION: YES
MONOCYTES ABSOLUTE: 0.8 K/UL (ref 0–1.3)
MONOCYTES RELATIVE PERCENT: 7.9 %
NEUTROPHILS ABSOLUTE: 7.2 K/UL (ref 1.7–7.7)
NEUTROPHILS RELATIVE PERCENT: 68.3 %
NITRITE, URINE: NEGATIVE
PDW BLD-RTO: 15 % (ref 12.4–15.4)
PH UA: 6.5
PLATELET # BLD: 203 K/UL (ref 135–450)
PMV BLD AUTO: 8.6 FL (ref 5–10.5)
POTASSIUM SERPL-SCNC: 4.3 MMOL/L (ref 3.5–5.1)
PROTEIN UA: 100 MG/DL
RBC # BLD: 4.92 M/UL (ref 4.2–5.9)
RBC UA: 1 /HPF (ref 0–4)
SODIUM BLD-SCNC: 141 MMOL/L (ref 136–145)
SPECIFIC GRAVITY UA: 1.02
TOTAL PROTEIN: 6.9 G/DL (ref 6.4–8.2)
TRIGL SERPL-MCNC: 130 MG/DL (ref 0–150)
TSH SERPL DL<=0.05 MIU/L-ACNC: 2.63 UIU/ML (ref 0.27–4.2)
URINE TYPE: ABNORMAL
UROBILINOGEN, URINE: 0.2 E.U./DL
VLDLC SERPL CALC-MCNC: 26 MG/DL
WBC # BLD: 10.6 K/UL (ref 4–11)
WBC UA: 0 /HPF (ref 0–5)

## 2018-09-21 DIAGNOSIS — M15.9 GENERALIZED OA: ICD-10-CM

## 2018-09-21 RX ORDER — GLYCERIN ADULT
500 SUPPOSITORY, RECTAL RECTAL EVERY 6 HOURS PRN
Qty: 90 TABLET | Refills: 3 | Status: SHIPPED | OUTPATIENT
Start: 2018-09-21 | End: 2019-01-16 | Stop reason: SDUPTHER

## 2018-10-17 ENCOUNTER — OFFICE VISIT (OUTPATIENT)
Dept: PRIMARY CARE CLINIC | Age: 78
End: 2018-10-17
Payer: MEDICARE

## 2018-10-17 VITALS
HEART RATE: 61 BPM | WEIGHT: 190 LBS | HEIGHT: 67 IN | SYSTOLIC BLOOD PRESSURE: 120 MMHG | DIASTOLIC BLOOD PRESSURE: 70 MMHG | BODY MASS INDEX: 29.82 KG/M2 | TEMPERATURE: 97.3 F | OXYGEN SATURATION: 96 %

## 2018-10-17 DIAGNOSIS — Z23 NEEDS FLU SHOT: ICD-10-CM

## 2018-10-17 DIAGNOSIS — Z79.4 TYPE 2 DIABETES MELLITUS WITHOUT COMPLICATION, WITH LONG-TERM CURRENT USE OF INSULIN (HCC): ICD-10-CM

## 2018-10-17 DIAGNOSIS — I25.10 CORONARY ARTERY DISEASE INVOLVING NATIVE CORONARY ARTERY OF NATIVE HEART WITHOUT ANGINA PECTORIS: ICD-10-CM

## 2018-10-17 DIAGNOSIS — E11.9 TYPE 2 DIABETES MELLITUS WITHOUT COMPLICATION, WITH LONG-TERM CURRENT USE OF INSULIN (HCC): ICD-10-CM

## 2018-10-17 DIAGNOSIS — E78.2 MIXED HYPERLIPIDEMIA: ICD-10-CM

## 2018-10-17 DIAGNOSIS — D50.8 OTHER IRON DEFICIENCY ANEMIA: ICD-10-CM

## 2018-10-17 DIAGNOSIS — I10 ESSENTIAL HYPERTENSION: ICD-10-CM

## 2018-10-17 DIAGNOSIS — R00.0 TACHYARRHYTHMIA: ICD-10-CM

## 2018-10-17 LAB — HBA1C MFR BLD: 8.5 %

## 2018-10-17 PROCEDURE — G8427 DOCREV CUR MEDS BY ELIG CLIN: HCPCS | Performed by: INTERNAL MEDICINE

## 2018-10-17 PROCEDURE — 1036F TOBACCO NON-USER: CPT | Performed by: INTERNAL MEDICINE

## 2018-10-17 PROCEDURE — G8598 ASA/ANTIPLAT THER USED: HCPCS | Performed by: INTERNAL MEDICINE

## 2018-10-17 PROCEDURE — 90662 IIV NO PRSV INCREASED AG IM: CPT | Performed by: INTERNAL MEDICINE

## 2018-10-17 PROCEDURE — G8417 CALC BMI ABV UP PARAM F/U: HCPCS | Performed by: INTERNAL MEDICINE

## 2018-10-17 PROCEDURE — G8482 FLU IMMUNIZE ORDER/ADMIN: HCPCS | Performed by: INTERNAL MEDICINE

## 2018-10-17 PROCEDURE — 83036 HEMOGLOBIN GLYCOSYLATED A1C: CPT | Performed by: INTERNAL MEDICINE

## 2018-10-17 PROCEDURE — 4040F PNEUMOC VAC/ADMIN/RCVD: CPT | Performed by: INTERNAL MEDICINE

## 2018-10-17 PROCEDURE — 1101F PT FALLS ASSESS-DOCD LE1/YR: CPT | Performed by: INTERNAL MEDICINE

## 2018-10-17 PROCEDURE — 99214 OFFICE O/P EST MOD 30 MIN: CPT | Performed by: INTERNAL MEDICINE

## 2018-10-17 PROCEDURE — 1123F ACP DISCUSS/DSCN MKR DOCD: CPT | Performed by: INTERNAL MEDICINE

## 2018-10-17 PROCEDURE — G0008 ADMIN INFLUENZA VIRUS VAC: HCPCS | Performed by: INTERNAL MEDICINE

## 2018-10-17 RX ORDER — METOPROLOL TARTRATE 50 MG/1
50 TABLET, FILM COATED ORAL 2 TIMES DAILY
Qty: 60 TABLET | Refills: 6 | Status: SHIPPED | OUTPATIENT
Start: 2018-10-17 | End: 2019-03-27 | Stop reason: SDUPTHER

## 2018-10-17 RX ORDER — ATORVASTATIN CALCIUM 80 MG/1
80 TABLET, FILM COATED ORAL DAILY
Qty: 30 TABLET | Refills: 6 | Status: SHIPPED | OUTPATIENT
Start: 2018-10-17 | End: 2019-03-27 | Stop reason: SDUPTHER

## 2018-10-17 RX ORDER — AMLODIPINE BESYLATE 2.5 MG/1
2.5 TABLET ORAL DAILY
Qty: 30 TABLET | Refills: 5 | Status: SHIPPED | OUTPATIENT
Start: 2018-10-17 | End: 2019-03-27 | Stop reason: SDUPTHER

## 2018-10-17 RX ORDER — FERROUS SULFATE 325(65) MG
325 TABLET ORAL
Qty: 90 TABLET | Refills: 3 | Status: SHIPPED | OUTPATIENT
Start: 2018-10-17 | End: 2019-03-27 | Stop reason: SDUPTHER

## 2018-10-17 RX ORDER — LISINOPRIL 40 MG/1
40 TABLET ORAL DAILY
Qty: 30 TABLET | Refills: 6 | Status: SHIPPED | OUTPATIENT
Start: 2018-10-17 | End: 2019-03-27 | Stop reason: SDUPTHER

## 2018-10-17 RX ORDER — FUROSEMIDE 20 MG/1
20 TABLET ORAL SEE ADMIN INSTRUCTIONS
Qty: 90 TABLET | Refills: 3 | Status: SHIPPED | OUTPATIENT
Start: 2018-10-17 | End: 2019-03-27 | Stop reason: SDUPTHER

## 2018-10-17 RX ORDER — ASPIRIN 81 MG/1
81 TABLET ORAL DAILY
Qty: 90 TABLET | Refills: 11 | Status: SHIPPED | OUTPATIENT
Start: 2018-10-17 | End: 2019-03-27 | Stop reason: SDUPTHER

## 2018-10-17 ASSESSMENT — ENCOUNTER SYMPTOMS
SHORTNESS OF BREATH: 1
CHEST TIGHTNESS: 0
COUGH: 0
WHEEZING: 0
EYES NEGATIVE: 1

## 2018-10-17 NOTE — PROGRESS NOTES
Subjective:      Patient ID: Christofer Arellano is a 65 y.o. male. 10/17/18 Patient presents with:  Diabetes taking all meds reg . Sometimes am sugars are low   Hypertension no complaints     No falls     Denies any anxiety / depression   Lives with son           Last seen  7/17/18       Diabetes   He presents for his follow-up diabetic visit. He has type 2 diabetes mellitus. His disease course has been fluctuating. Pertinent negatives for hypoglycemia include no headaches or tremors. Pertinent negatives for diabetes include no chest pain, no fatigue and no weakness. Eye exam is current. Hypertension   This is a chronic problem. The problem has been waxing and waning since onset. Associated symptoms include anxiety and shortness of breath (little off and on ). Pertinent negatives include no chest pain, headaches or palpitations. Review of Systems   Constitutional: Negative for chills, fatigue and fever. Pneumovac 1/12; Prevnar 1/16     Flu vac10/18    Zostavac / 6/17 at [facility]    Tdap 4/17   HENT:        Upper Dentures   Eyes: Negative. Eye exam  3/18   Respiratory: Positive for shortness of breath (little off and on ). Negative for cough, chest tightness and wheezing. Does not smoke ; No Etoh    Cardiovascular: Negative. Negative for chest pain and palpitations. Known CAD ; S/P CABG   Gastrointestinal:        Colonoscopy 12/2010 . Polyps . C/o Dr Vonda Keyes   Endocrine:         Diabetic    Genitourinary: Negative for dysuria. Musculoskeletal: Positive for arthralgias. Saw Podiatrist  7/18   Skin: Negative for rash. Allergic/Immunologic: Negative for food allergies. Neurological: Negative for tremors, weakness and headaches. Psychiatric/Behavioral: Negative for sleep disturbance. Objective:   Physical Exam   Constitutional: He is oriented to person, place, and time. No distress. Eyes: Conjunctivae are normal.   Neck: Neck supple.    Cardiovascular: Regular

## 2018-10-30 RX ORDER — PEN NEEDLE, DIABETIC 31 GX5/16"
NEEDLE, DISPOSABLE MISCELLANEOUS
Qty: 100 EACH | Refills: 6 | Status: SHIPPED | OUTPATIENT
Start: 2018-10-30 | End: 2020-01-01

## 2019-01-01 ENCOUNTER — OFFICE VISIT (OUTPATIENT)
Dept: PRIMARY CARE CLINIC | Age: 79
End: 2019-01-01
Payer: MEDICARE

## 2019-01-01 VITALS
BODY MASS INDEX: 29.73 KG/M2 | HEART RATE: 62 BPM | DIASTOLIC BLOOD PRESSURE: 60 MMHG | WEIGHT: 185 LBS | OXYGEN SATURATION: 98 % | SYSTOLIC BLOOD PRESSURE: 120 MMHG | HEIGHT: 66 IN

## 2019-01-01 DIAGNOSIS — E78.2 MIXED HYPERLIPIDEMIA: ICD-10-CM

## 2019-01-01 DIAGNOSIS — Z00.00 PHYSICAL EXAM: ICD-10-CM

## 2019-01-01 DIAGNOSIS — I25.10 CORONARY ARTERY DISEASE INVOLVING NATIVE CORONARY ARTERY OF NATIVE HEART WITHOUT ANGINA PECTORIS: ICD-10-CM

## 2019-01-01 DIAGNOSIS — I10 ESSENTIAL HYPERTENSION: ICD-10-CM

## 2019-01-01 DIAGNOSIS — M15.9 GENERALIZED OA: ICD-10-CM

## 2019-01-01 DIAGNOSIS — Z79.4 TYPE 2 DIABETES MELLITUS WITHOUT COMPLICATION, WITH LONG-TERM CURRENT USE OF INSULIN (HCC): ICD-10-CM

## 2019-01-01 DIAGNOSIS — E11.9 TYPE 2 DIABETES MELLITUS WITHOUT COMPLICATION, WITH LONG-TERM CURRENT USE OF INSULIN (HCC): ICD-10-CM

## 2019-01-01 DIAGNOSIS — Z23 NEEDS FLU SHOT: ICD-10-CM

## 2019-01-01 LAB
A/G RATIO: 2.1 (ref 1.1–2.2)
ALBUMIN SERPL-MCNC: 4.6 G/DL (ref 3.4–5)
ALP BLD-CCNC: 67 U/L (ref 40–129)
ALT SERPL-CCNC: 11 U/L (ref 10–40)
ANION GAP SERPL CALCULATED.3IONS-SCNC: 15 MMOL/L (ref 3–16)
AST SERPL-CCNC: 14 U/L (ref 15–37)
BASOPHILS ABSOLUTE: 0.1 K/UL (ref 0–0.2)
BASOPHILS RELATIVE PERCENT: 1 %
BILIRUB SERPL-MCNC: 0.4 MG/DL (ref 0–1)
BILIRUBIN URINE: ABNORMAL
BLOOD, URINE: NEGATIVE
BUN BLDV-MCNC: 28 MG/DL (ref 7–20)
CALCIUM SERPL-MCNC: 8.8 MG/DL (ref 8.3–10.6)
CHLORIDE BLD-SCNC: 105 MMOL/L (ref 99–110)
CHOLESTEROL, TOTAL: 136 MG/DL (ref 0–199)
CLARITY: CLEAR
CO2: 27 MMOL/L (ref 21–32)
COLOR: YELLOW
CREAT SERPL-MCNC: 1.4 MG/DL (ref 0.8–1.3)
EOSINOPHILS ABSOLUTE: 0.4 K/UL (ref 0–0.6)
EOSINOPHILS RELATIVE PERCENT: 4.2 %
EPITHELIAL CELLS, UA: 1 /HPF (ref 0–5)
GFR AFRICAN AMERICAN: 59
GFR NON-AFRICAN AMERICAN: 49
GLOBULIN: 2.2 G/DL
GLUCOSE BLD-MCNC: 65 MG/DL (ref 70–99)
GLUCOSE URINE: NEGATIVE MG/DL
HBA1C MFR BLD: 8.1 %
HCT VFR BLD CALC: 40 % (ref 40.5–52.5)
HDLC SERPL-MCNC: 36 MG/DL (ref 40–60)
HEMOGLOBIN: 13.2 G/DL (ref 13.5–17.5)
HYALINE CASTS: 1 /LPF (ref 0–8)
KETONES, URINE: NEGATIVE MG/DL
LDL CHOLESTEROL CALCULATED: 71 MG/DL
LEUKOCYTE ESTERASE, URINE: NEGATIVE
LYMPHOCYTES ABSOLUTE: 2.3 K/UL (ref 1–5.1)
LYMPHOCYTES RELATIVE PERCENT: 21.4 %
MCH RBC QN AUTO: 29 PG (ref 26–34)
MCHC RBC AUTO-ENTMCNC: 32.9 G/DL (ref 31–36)
MCV RBC AUTO: 88.2 FL (ref 80–100)
MICROSCOPIC EXAMINATION: YES
MONOCYTES ABSOLUTE: 0.8 K/UL (ref 0–1.3)
MONOCYTES RELATIVE PERCENT: 7.9 %
NEUTROPHILS ABSOLUTE: 7 K/UL (ref 1.7–7.7)
NEUTROPHILS RELATIVE PERCENT: 65.5 %
NITRITE, URINE: NEGATIVE
PDW BLD-RTO: 14.3 % (ref 12.4–15.4)
PH UA: 6 (ref 5–8)
PLATELET # BLD: 202 K/UL (ref 135–450)
PMV BLD AUTO: 9.1 FL (ref 5–10.5)
POTASSIUM SERPL-SCNC: 4.6 MMOL/L (ref 3.5–5.1)
PROTEIN UA: 30 MG/DL
RBC # BLD: 4.53 M/UL (ref 4.2–5.9)
RBC UA: 4 /HPF (ref 0–4)
SODIUM BLD-SCNC: 147 MMOL/L (ref 136–145)
SPECIFIC GRAVITY UA: 1.03 (ref 1–1.03)
TOTAL PROTEIN: 6.8 G/DL (ref 6.4–8.2)
TRIGL SERPL-MCNC: 145 MG/DL (ref 0–150)
TSH SERPL DL<=0.05 MIU/L-ACNC: 1.95 UIU/ML (ref 0.27–4.2)
URINE TYPE: ABNORMAL
UROBILINOGEN, URINE: 0.2 E.U./DL
VITAMIN D 25-HYDROXY: 17.2 NG/ML
VLDLC SERPL CALC-MCNC: 29 MG/DL
WBC # BLD: 10.7 K/UL (ref 4–11)
WBC UA: 1 /HPF (ref 0–5)

## 2019-01-01 PROCEDURE — 1123F ACP DISCUSS/DSCN MKR DOCD: CPT | Performed by: INTERNAL MEDICINE

## 2019-01-01 PROCEDURE — 1036F TOBACCO NON-USER: CPT | Performed by: INTERNAL MEDICINE

## 2019-01-01 PROCEDURE — G8427 DOCREV CUR MEDS BY ELIG CLIN: HCPCS | Performed by: INTERNAL MEDICINE

## 2019-01-01 PROCEDURE — 4040F PNEUMOC VAC/ADMIN/RCVD: CPT | Performed by: INTERNAL MEDICINE

## 2019-01-01 PROCEDURE — G8598 ASA/ANTIPLAT THER USED: HCPCS | Performed by: INTERNAL MEDICINE

## 2019-01-01 PROCEDURE — G8417 CALC BMI ABV UP PARAM F/U: HCPCS | Performed by: INTERNAL MEDICINE

## 2019-01-01 PROCEDURE — 99214 OFFICE O/P EST MOD 30 MIN: CPT | Performed by: INTERNAL MEDICINE

## 2019-01-01 PROCEDURE — 83036 HEMOGLOBIN GLYCOSYLATED A1C: CPT | Performed by: INTERNAL MEDICINE

## 2019-01-01 RX ORDER — FUROSEMIDE 20 MG/1
20 TABLET ORAL SEE ADMIN INSTRUCTIONS
Qty: 90 TABLET | Refills: 3 | Status: SHIPPED | OUTPATIENT
Start: 2019-01-01 | End: 2020-01-01 | Stop reason: SDUPTHER

## 2019-01-01 RX ORDER — LISINOPRIL 40 MG/1
40 TABLET ORAL DAILY
Qty: 90 TABLET | Refills: 6 | Status: SHIPPED | OUTPATIENT
Start: 2019-01-01 | End: 2020-01-01 | Stop reason: SDUPTHER

## 2019-01-01 RX ORDER — ASPIRIN 81 MG/1
81 TABLET ORAL DAILY
Qty: 90 TABLET | Refills: 11 | Status: SHIPPED | OUTPATIENT
Start: 2019-01-01 | End: 2020-01-01 | Stop reason: SDUPTHER

## 2019-01-01 RX ORDER — ATORVASTATIN CALCIUM 80 MG/1
80 TABLET, FILM COATED ORAL DAILY
Qty: 90 TABLET | Refills: 6 | Status: SHIPPED | OUTPATIENT
Start: 2019-01-01 | End: 2020-01-01 | Stop reason: SDUPTHER

## 2019-01-01 RX ORDER — GLYCERIN ADULT
500 SUPPOSITORY, RECTAL RECTAL EVERY 6 HOURS PRN
Qty: 90 TABLET | Refills: 3 | Status: SHIPPED | OUTPATIENT
Start: 2019-01-01 | End: 2020-01-01

## 2019-01-01 RX ORDER — AMLODIPINE BESYLATE 2.5 MG/1
2.5 TABLET ORAL DAILY
Qty: 90 TABLET | Refills: 5 | Status: SHIPPED | OUTPATIENT
Start: 2019-01-01 | End: 2020-01-01 | Stop reason: SDUPTHER

## 2019-01-01 RX ORDER — METOPROLOL TARTRATE 50 MG/1
50 TABLET, FILM COATED ORAL 2 TIMES DAILY
Qty: 180 TABLET | Refills: 5 | Status: SHIPPED | OUTPATIENT
Start: 2019-01-01 | End: 2020-01-01 | Stop reason: SDUPTHER

## 2019-01-01 ASSESSMENT — ENCOUNTER SYMPTOMS
COUGH: 0
WHEEZING: 0
CHEST TIGHTNESS: 0
EYES NEGATIVE: 1

## 2019-01-16 DIAGNOSIS — M15.9 GENERALIZED OA: ICD-10-CM

## 2019-01-16 RX ORDER — GLYCERIN ADULT
500 SUPPOSITORY, RECTAL RECTAL EVERY 6 HOURS PRN
Qty: 90 TABLET | Refills: 3 | Status: SHIPPED | OUTPATIENT
Start: 2019-01-16 | End: 2019-01-01 | Stop reason: SDUPTHER

## 2019-03-27 ENCOUNTER — OFFICE VISIT (OUTPATIENT)
Dept: PRIMARY CARE CLINIC | Age: 79
End: 2019-03-27
Payer: MEDICARE

## 2019-03-27 VITALS
WEIGHT: 184 LBS | HEART RATE: 59 BPM | BODY MASS INDEX: 28.88 KG/M2 | DIASTOLIC BLOOD PRESSURE: 60 MMHG | HEIGHT: 67 IN | SYSTOLIC BLOOD PRESSURE: 110 MMHG

## 2019-03-27 DIAGNOSIS — D50.8 OTHER IRON DEFICIENCY ANEMIA: ICD-10-CM

## 2019-03-27 DIAGNOSIS — Z79.4 TYPE 2 DIABETES MELLITUS WITHOUT COMPLICATION, WITH LONG-TERM CURRENT USE OF INSULIN (HCC): ICD-10-CM

## 2019-03-27 DIAGNOSIS — E78.2 MIXED HYPERLIPIDEMIA: ICD-10-CM

## 2019-03-27 DIAGNOSIS — I10 ESSENTIAL HYPERTENSION: ICD-10-CM

## 2019-03-27 DIAGNOSIS — E11.9 TYPE 2 DIABETES MELLITUS WITHOUT COMPLICATION, WITH LONG-TERM CURRENT USE OF INSULIN (HCC): ICD-10-CM

## 2019-03-27 DIAGNOSIS — R63.4 WEIGHT LOSS: Primary | ICD-10-CM

## 2019-03-27 DIAGNOSIS — I25.10 CORONARY ARTERY DISEASE INVOLVING NATIVE CORONARY ARTERY OF NATIVE HEART WITHOUT ANGINA PECTORIS: ICD-10-CM

## 2019-03-27 LAB — HBA1C MFR BLD: 7.5 %

## 2019-03-27 PROCEDURE — 83036 HEMOGLOBIN GLYCOSYLATED A1C: CPT | Performed by: INTERNAL MEDICINE

## 2019-03-27 PROCEDURE — 1123F ACP DISCUSS/DSCN MKR DOCD: CPT | Performed by: INTERNAL MEDICINE

## 2019-03-27 PROCEDURE — 99214 OFFICE O/P EST MOD 30 MIN: CPT | Performed by: INTERNAL MEDICINE

## 2019-03-27 PROCEDURE — 4040F PNEUMOC VAC/ADMIN/RCVD: CPT | Performed by: INTERNAL MEDICINE

## 2019-03-27 PROCEDURE — G8417 CALC BMI ABV UP PARAM F/U: HCPCS | Performed by: INTERNAL MEDICINE

## 2019-03-27 PROCEDURE — G8598 ASA/ANTIPLAT THER USED: HCPCS | Performed by: INTERNAL MEDICINE

## 2019-03-27 PROCEDURE — G8427 DOCREV CUR MEDS BY ELIG CLIN: HCPCS | Performed by: INTERNAL MEDICINE

## 2019-03-27 PROCEDURE — G8482 FLU IMMUNIZE ORDER/ADMIN: HCPCS | Performed by: INTERNAL MEDICINE

## 2019-03-27 PROCEDURE — 1036F TOBACCO NON-USER: CPT | Performed by: INTERNAL MEDICINE

## 2019-03-27 RX ORDER — ATORVASTATIN CALCIUM 80 MG/1
80 TABLET, FILM COATED ORAL DAILY
Qty: 30 TABLET | Refills: 6 | Status: SHIPPED | OUTPATIENT
Start: 2019-03-27 | End: 2019-01-01 | Stop reason: SDUPTHER

## 2019-03-27 RX ORDER — FUROSEMIDE 20 MG/1
20 TABLET ORAL SEE ADMIN INSTRUCTIONS
Qty: 90 TABLET | Refills: 3 | Status: SHIPPED | OUTPATIENT
Start: 2019-03-27 | End: 2019-01-01 | Stop reason: SDUPTHER

## 2019-03-27 RX ORDER — LISINOPRIL 40 MG/1
40 TABLET ORAL DAILY
Qty: 30 TABLET | Refills: 6 | Status: SHIPPED | OUTPATIENT
Start: 2019-03-27 | End: 2019-01-01 | Stop reason: SDUPTHER

## 2019-03-27 RX ORDER — ASPIRIN 81 MG/1
81 TABLET ORAL DAILY
Qty: 90 TABLET | Refills: 11 | Status: SHIPPED | OUTPATIENT
Start: 2019-03-27 | End: 2019-01-01 | Stop reason: SDUPTHER

## 2019-03-27 RX ORDER — METOPROLOL TARTRATE 50 MG/1
50 TABLET, FILM COATED ORAL 2 TIMES DAILY
Qty: 60 TABLET | Refills: 6 | Status: SHIPPED | OUTPATIENT
Start: 2019-03-27 | End: 2019-01-01 | Stop reason: SDUPTHER

## 2019-03-27 RX ORDER — AMLODIPINE BESYLATE 2.5 MG/1
2.5 TABLET ORAL DAILY
Qty: 30 TABLET | Refills: 6 | Status: SHIPPED | OUTPATIENT
Start: 2019-03-27 | End: 2019-01-01 | Stop reason: SDUPTHER

## 2019-03-27 RX ORDER — FERROUS SULFATE 325(65) MG
325 TABLET ORAL
Qty: 90 TABLET | Refills: 3 | Status: SHIPPED | OUTPATIENT
Start: 2019-03-27 | End: 2020-01-01 | Stop reason: SDUPTHER

## 2019-03-27 ASSESSMENT — PATIENT HEALTH QUESTIONNAIRE - PHQ9
SUM OF ALL RESPONSES TO PHQ QUESTIONS 1-9: 0
SUM OF ALL RESPONSES TO PHQ9 QUESTIONS 1 & 2: 0
1. LITTLE INTEREST OR PLEASURE IN DOING THINGS: 0
SUM OF ALL RESPONSES TO PHQ QUESTIONS 1-9: 0
2. FEELING DOWN, DEPRESSED OR HOPELESS: 0

## 2019-03-27 ASSESSMENT — ENCOUNTER SYMPTOMS
COUGH: 0
EYES NEGATIVE: 1
CHEST TIGHTNESS: 0
WHEEZING: 0

## 2019-05-21 DIAGNOSIS — Z79.4 TYPE 2 DIABETES MELLITUS WITHOUT COMPLICATION, WITH LONG-TERM CURRENT USE OF INSULIN (HCC): ICD-10-CM

## 2019-05-21 DIAGNOSIS — I10 ESSENTIAL HYPERTENSION: ICD-10-CM

## 2019-05-21 DIAGNOSIS — E11.9 TYPE 2 DIABETES MELLITUS WITHOUT COMPLICATION, WITH LONG-TERM CURRENT USE OF INSULIN (HCC): ICD-10-CM

## 2019-05-21 RX ORDER — LISINOPRIL 40 MG/1
TABLET ORAL
Qty: 30 TABLET | Refills: 4 | Status: SHIPPED | OUTPATIENT
Start: 2019-05-21 | End: 2019-01-01

## 2019-09-16 NOTE — PROGRESS NOTES
Subjective:      Patient ID: Shama Nicole is a 77 y.o. male. 9/16/19 Patient presents with:  Diabetes  Check up             Last seen  3/27/19 Patient presents with:  Diabetes  3 Month Follow-Up      No falls     DDoes get a little depressed b/e lonliness ! Lives with son             Diabetes   He presents for his follow-up diabetic visit. He has type 2 diabetes mellitus. His disease course has been fluctuating. Pertinent negatives for hypoglycemia include no headaches or tremors. Pertinent negatives for diabetes include no chest pain, no fatigue and no weakness. Eye exam is current. Hypertension   This is a chronic problem. The problem has been waxing and waning since onset. Associated symptoms include anxiety. Pertinent negatives include no chest pain, headaches or palpitations. Review of Systems   Constitutional: Positive for unexpected weight change (voluntary). Negative for chills, fatigue and fever. Pneumovac 1/12; Prevnar 1/16     Flu vac10/18    Zostavac / 6/17 at [facility]    Tdap 4/17   HENT:        Upper Dentures   Eyes: Negative. Eye exam  3/19   Respiratory: Negative for cough, chest tightness and wheezing. Does not smoke ; No Etoh    Cardiovascular: Negative. Negative for chest pain and palpitations. Known CAD ; S/P CABG   Gastrointestinal:        Colonoscopy 12/2010 . Polyps . C/o Dr Jordan Salomon   Endocrine:         Diabetic    Genitourinary: Negative for dysuria. Musculoskeletal: Positive for arthralgias. Saw Podiatrist  7/18   Skin: Negative for rash. Allergic/Immunologic: Negative for food allergies. Neurological: Negative for tremors, weakness and headaches. Psychiatric/Behavioral: Negative for sleep disturbance. Objective:   Physical Exam   Constitutional: He is oriented to person, place, and time. No distress. Weight stable this visit      Lost 10 lbs last time    Eyes: Conjunctivae are normal.   Neck: Neck supple.

## 2020-01-01 ENCOUNTER — APPOINTMENT (OUTPATIENT)
Dept: ULTRASOUND IMAGING | Age: 80
DRG: 698 | End: 2020-01-01
Payer: MEDICARE

## 2020-01-01 ENCOUNTER — APPOINTMENT (OUTPATIENT)
Dept: GENERAL RADIOLOGY | Age: 80
DRG: 682 | End: 2020-01-01
Payer: MEDICARE

## 2020-01-01 ENCOUNTER — APPOINTMENT (OUTPATIENT)
Dept: CT IMAGING | Age: 80
DRG: 698 | End: 2020-01-01
Payer: MEDICARE

## 2020-01-01 ENCOUNTER — HOSPITAL ENCOUNTER (INPATIENT)
Age: 80
LOS: 4 days | Discharge: NURSING, REHAB OR LONG-TERM CARE | DRG: 682 | End: 2020-08-07
Attending: EMERGENCY MEDICINE | Admitting: INTERNAL MEDICINE
Payer: MEDICARE

## 2020-01-01 ENCOUNTER — APPOINTMENT (OUTPATIENT)
Dept: GENERAL RADIOLOGY | Age: 80
DRG: 698 | End: 2020-01-01
Payer: MEDICARE

## 2020-01-01 ENCOUNTER — APPOINTMENT (OUTPATIENT)
Dept: CT IMAGING | Age: 80
DRG: 682 | End: 2020-01-01
Payer: MEDICARE

## 2020-01-01 ENCOUNTER — APPOINTMENT (OUTPATIENT)
Dept: MRI IMAGING | Age: 80
DRG: 682 | End: 2020-01-01
Payer: MEDICARE

## 2020-01-01 ENCOUNTER — HOSPITAL ENCOUNTER (INPATIENT)
Age: 80
LOS: 2 days | DRG: 698 | End: 2020-08-10
Attending: STUDENT IN AN ORGANIZED HEALTH CARE EDUCATION/TRAINING PROGRAM | Admitting: INTERNAL MEDICINE
Payer: MEDICARE

## 2020-01-01 ENCOUNTER — OFFICE VISIT (OUTPATIENT)
Dept: PRIMARY CARE CLINIC | Age: 80
End: 2020-01-01
Payer: MEDICARE

## 2020-01-01 VITALS
BODY MASS INDEX: 30.47 KG/M2 | OXYGEN SATURATION: 70 % | SYSTOLIC BLOOD PRESSURE: 39 MMHG | HEART RATE: 68 BPM | DIASTOLIC BLOOD PRESSURE: 20 MMHG | RESPIRATION RATE: 18 BRPM | WEIGHT: 189.6 LBS | TEMPERATURE: 93.6 F | HEIGHT: 66 IN

## 2020-01-01 VITALS
SYSTOLIC BLOOD PRESSURE: 133 MMHG | WEIGHT: 165.5 LBS | TEMPERATURE: 98.6 F | HEIGHT: 66 IN | DIASTOLIC BLOOD PRESSURE: 67 MMHG | RESPIRATION RATE: 16 BRPM | BODY MASS INDEX: 26.6 KG/M2 | OXYGEN SATURATION: 96 % | HEART RATE: 61 BPM

## 2020-01-01 VITALS
SYSTOLIC BLOOD PRESSURE: 120 MMHG | DIASTOLIC BLOOD PRESSURE: 55 MMHG | BODY MASS INDEX: 29.89 KG/M2 | HEART RATE: 56 BPM | HEIGHT: 66 IN | WEIGHT: 186 LBS

## 2020-01-01 LAB
A/G RATIO: 0.4 (ref 1.1–2.2)
A/G RATIO: 0.4 (ref 1.1–2.2)
A/G RATIO: 0.5 (ref 1.1–2.2)
A/G RATIO: 0.5 (ref 1.1–2.2)
A/G RATIO: 1 (ref 1.1–2.2)
A/G RATIO: 1.1 (ref 1.1–2.2)
A/G RATIO: 1.1 (ref 1.1–2.2)
ACETAMINOPHEN LEVEL: <5 UG/ML (ref 10–30)
ALBUMIN CSF: 41.5 MG/DL (ref 10–30)
ALBUMIN SERPL-MCNC: 1.1 G/DL (ref 3.4–5)
ALBUMIN SERPL-MCNC: 1.2 G/DL (ref 3.4–5)
ALBUMIN SERPL-MCNC: 1.5 G/DL (ref 3.4–5)
ALBUMIN SERPL-MCNC: 1.8 G/DL (ref 3.4–5)
ALBUMIN SERPL-MCNC: 2.4 G/DL (ref 3.4–5)
ALBUMIN SERPL-MCNC: 2.6 G/DL (ref 3.1–4.9)
ALBUMIN SERPL-MCNC: 2.9 G/DL (ref 3.4–5)
ALBUMIN SERPL-MCNC: 2733 MG/DL (ref 3400–5000)
ALBUMIN SERPL-MCNC: 3.1 G/DL (ref 3.4–5)
ALBUMIN SERPL-MCNC: 3.1 G/DL (ref 3.4–5)
ALBUMIN SERPL-MCNC: 4 G/DL (ref 3.4–5)
ALP BLD-CCNC: 105 U/L (ref 40–129)
ALP BLD-CCNC: 130 U/L (ref 40–129)
ALP BLD-CCNC: 144 U/L (ref 40–129)
ALP BLD-CCNC: 211 U/L (ref 40–129)
ALP BLD-CCNC: 264 U/L (ref 40–129)
ALP BLD-CCNC: 285 U/L (ref 40–129)
ALP BLD-CCNC: 70 U/L (ref 40–129)
ALP BLD-CCNC: 70 U/L (ref 40–129)
ALP BLD-CCNC: 84 U/L (ref 40–129)
ALPHA-1-GLOBULIN: 0.3 G/DL (ref 0.2–0.4)
ALPHA-2-GLOBULIN: 0.9 G/DL (ref 0.4–1.1)
ALT SERPL-CCNC: 12 U/L (ref 10–40)
ALT SERPL-CCNC: 13 U/L (ref 10–40)
ALT SERPL-CCNC: 1351 U/L (ref 10–40)
ALT SERPL-CCNC: 190 U/L (ref 10–40)
ALT SERPL-CCNC: 539 U/L (ref 10–40)
ALT SERPL-CCNC: 56 U/L (ref 10–40)
ALT SERPL-CCNC: 74 U/L (ref 10–40)
ALT SERPL-CCNC: 8 U/L (ref 10–40)
ALT SERPL-CCNC: 9 U/L (ref 10–40)
AMMONIA: 18 UMOL/L (ref 16–60)
AMPHETAMINE SCREEN, URINE: NORMAL
ANCA IFA: NORMAL
ANGIOTENSIN CONVERTING ENZYME CSF: <0.4 U/L (ref 0–2.5)
ANION GAP SERPL CALCULATED.3IONS-SCNC: 10 MMOL/L (ref 3–16)
ANION GAP SERPL CALCULATED.3IONS-SCNC: 11 MMOL/L (ref 3–16)
ANION GAP SERPL CALCULATED.3IONS-SCNC: 14 MMOL/L (ref 3–16)
ANION GAP SERPL CALCULATED.3IONS-SCNC: 17 MMOL/L (ref 3–16)
ANION GAP SERPL CALCULATED.3IONS-SCNC: 17 MMOL/L (ref 3–16)
ANION GAP SERPL CALCULATED.3IONS-SCNC: 18 MMOL/L (ref 3–16)
ANION GAP SERPL CALCULATED.3IONS-SCNC: 18 MMOL/L (ref 3–16)
ANION GAP SERPL CALCULATED.3IONS-SCNC: 20 MMOL/L (ref 3–16)
ANION GAP SERPL CALCULATED.3IONS-SCNC: 21 MMOL/L (ref 3–16)
ANION GAP SERPL CALCULATED.3IONS-SCNC: 7 MMOL/L (ref 3–16)
ANION GAP SERPL CALCULATED.3IONS-SCNC: 8 MMOL/L (ref 3–16)
ANION GAP SERPL CALCULATED.3IONS-SCNC: 9 MMOL/L (ref 3–16)
ANTI-NUCLEAR ANTIBODY (ANA): NEGATIVE
APPEARANCE CSF: CLEAR
APTT: 39.5 SEC (ref 24.2–36.2)
AST SERPL-CCNC: 109 U/L (ref 15–37)
AST SERPL-CCNC: 12 U/L (ref 15–37)
AST SERPL-CCNC: 13 U/L (ref 15–37)
AST SERPL-CCNC: 16 U/L (ref 15–37)
AST SERPL-CCNC: 1618 U/L (ref 15–37)
AST SERPL-CCNC: 25 U/L (ref 15–37)
AST SERPL-CCNC: 257 U/L (ref 15–37)
AST SERPL-CCNC: 652 U/L (ref 15–37)
AST SERPL-CCNC: 82 U/L (ref 15–37)
BANDED NEUTROPHILS RELATIVE PERCENT: 39 % (ref 0–7)
BANDED NEUTROPHILS RELATIVE PERCENT: 8 % (ref 0–7)
BARBITURATE SCREEN URINE: NORMAL
BASE EXCESS ARTERIAL: -13.9 MMOL/L (ref -3–3)
BASE EXCESS ARTERIAL: -14.1 MMOL/L (ref -3–3)
BASE EXCESS ARTERIAL: -15 (ref -3–3)
BASE EXCESS VENOUS: -10 (ref -3–3)
BASE EXCESS VENOUS: -13 (ref -3–3)
BASE EXCESS VENOUS: -14 (ref -3–3)
BASE EXCESS VENOUS: -24 (ref -3–3)
BASE EXCESS VENOUS: -8.2 MMOL/L (ref -3–3)
BASOPHILS ABSOLUTE: 0 K/UL (ref 0–0.2)
BASOPHILS ABSOLUTE: 0 K/UL (ref 0–0.2)
BASOPHILS ABSOLUTE: 0.1 K/UL (ref 0–0.2)
BASOPHILS RELATIVE PERCENT: 0 %
BASOPHILS RELATIVE PERCENT: 0 %
BASOPHILS RELATIVE PERCENT: 0.9 %
BASOPHILS RELATIVE PERCENT: 1 %
BENZODIAZEPINE SCREEN, URINE: NORMAL
BETA GLOBULIN: 0.9 G/DL (ref 0.9–1.6)
BILIRUB SERPL-MCNC: 0.4 MG/DL (ref 0–1)
BILIRUB SERPL-MCNC: 0.5 MG/DL (ref 0–1)
BILIRUB SERPL-MCNC: 0.6 MG/DL (ref 0–1)
BILIRUB SERPL-MCNC: 0.8 MG/DL (ref 0–1)
BILIRUB SERPL-MCNC: 0.9 MG/DL (ref 0–1)
BILIRUB SERPL-MCNC: 1.1 MG/DL (ref 0–1)
BILIRUB SERPL-MCNC: 1.1 MG/DL (ref 0–1)
BILIRUB SERPL-MCNC: 1.3 MG/DL (ref 0–1)
BILIRUB SERPL-MCNC: 1.5 MG/DL (ref 0–1)
BILIRUBIN URINE: ABNORMAL
BILIRUBIN URINE: ABNORMAL
BLOOD CULTURE, ROUTINE: ABNORMAL
BLOOD CULTURE, ROUTINE: NORMAL
BLOOD, URINE: ABNORMAL
BLOOD, URINE: NEGATIVE
BUN BLDV-MCNC: 18 MG/DL (ref 7–20)
BUN BLDV-MCNC: 19 MG/DL (ref 7–20)
BUN BLDV-MCNC: 19 MG/DL (ref 7–20)
BUN BLDV-MCNC: 22 MG/DL (ref 7–20)
BUN BLDV-MCNC: 26 MG/DL (ref 7–20)
BUN BLDV-MCNC: 29 MG/DL (ref 7–20)
BUN BLDV-MCNC: 34 MG/DL (ref 7–20)
BUN BLDV-MCNC: 35 MG/DL (ref 7–20)
BUN BLDV-MCNC: 37 MG/DL (ref 7–20)
BUN BLDV-MCNC: 45 MG/DL (ref 7–20)
BUN BLDV-MCNC: 46 MG/DL (ref 7–20)
BUN BLDV-MCNC: 49 MG/DL (ref 7–20)
C-REACTIVE PROTEIN: 4.5 MG/L (ref 0–5.1)
CALCIUM IONIZED: 0.67 MMOL/L (ref 1.12–1.32)
CALCIUM IONIZED: 0.7 MMOL/L (ref 1.12–1.32)
CALCIUM IONIZED: 0.77 MMOL/L (ref 1.12–1.32)
CALCIUM IONIZED: 0.77 MMOL/L (ref 1.12–1.32)
CALCIUM IONIZED: 0.79 MMOL/L (ref 1.12–1.32)
CALCIUM IONIZED: 1 MMOL/L (ref 1.12–1.32)
CALCIUM IONIZED: 1.22 MMOL/L (ref 1.12–1.32)
CALCIUM SERPL-MCNC: 5.1 MG/DL (ref 8.3–10.6)
CALCIUM SERPL-MCNC: 5.4 MG/DL (ref 8.3–10.6)
CALCIUM SERPL-MCNC: 5.6 MG/DL (ref 8.3–10.6)
CALCIUM SERPL-MCNC: 6.2 MG/DL (ref 8.3–10.6)
CALCIUM SERPL-MCNC: 6.4 MG/DL (ref 8.3–10.6)
CALCIUM SERPL-MCNC: 6.9 MG/DL (ref 8.3–10.6)
CALCIUM SERPL-MCNC: 7.1 MG/DL (ref 8.3–10.6)
CALCIUM SERPL-MCNC: 7.7 MG/DL (ref 8.3–10.6)
CALCIUM SERPL-MCNC: 7.9 MG/DL (ref 8.3–10.6)
CALCIUM SERPL-MCNC: 8 MG/DL (ref 8.3–10.6)
CALCIUM SERPL-MCNC: 8.3 MG/DL (ref 8.3–10.6)
CALCIUM SERPL-MCNC: 9.5 MG/DL (ref 8.3–10.6)
CANNABINOID SCREEN URINE: NORMAL
CARBOXYHEMOGLOBIN ARTERIAL: 0.8 % (ref 0–1.5)
CARBOXYHEMOGLOBIN ARTERIAL: 0.9 % (ref 0–1.5)
CARBOXYHEMOGLOBIN: 2.4 % (ref 0–1.5)
CHLORIDE BLD-SCNC: 100 MMOL/L (ref 99–110)
CHLORIDE BLD-SCNC: 102 MMOL/L (ref 99–110)
CHLORIDE BLD-SCNC: 103 MMOL/L (ref 99–110)
CHLORIDE BLD-SCNC: 105 MMOL/L (ref 99–110)
CHLORIDE BLD-SCNC: 106 MMOL/L (ref 99–110)
CHLORIDE BLD-SCNC: 107 MMOL/L (ref 99–110)
CHLORIDE BLD-SCNC: 114 MMOL/L (ref 99–110)
CHLORIDE BLD-SCNC: 94 MMOL/L (ref 99–110)
CHLORIDE BLD-SCNC: 94 MMOL/L (ref 99–110)
CHLORIDE BLD-SCNC: 96 MMOL/L (ref 99–110)
CHLORIDE BLD-SCNC: 97 MMOL/L (ref 99–110)
CHLORIDE BLD-SCNC: 99 MMOL/L (ref 99–110)
CLARITY: ABNORMAL
CLARITY: CLEAR
CLOT EVALUATION CSF: NORMAL
CO2: 13 MMOL/L (ref 21–32)
CO2: 14 MMOL/L (ref 21–32)
CO2: 15 MMOL/L (ref 21–32)
CO2: 16 MMOL/L (ref 21–32)
CO2: 17 MMOL/L (ref 21–32)
CO2: 18 MMOL/L (ref 21–32)
CO2: 23 MMOL/L (ref 21–32)
CO2: 24 MMOL/L (ref 21–32)
CO2: 25 MMOL/L (ref 21–32)
CO2: 27 MMOL/L (ref 21–32)
COCAINE METABOLITE SCREEN URINE: NORMAL
COLOR CSF: COLORLESS
COLOR: ABNORMAL
COLOR: YELLOW
CREAT SERPL-MCNC: 1 MG/DL (ref 0.8–1.3)
CREAT SERPL-MCNC: 1.3 MG/DL (ref 0.8–1.3)
CREAT SERPL-MCNC: 1.4 MG/DL (ref 0.8–1.3)
CREAT SERPL-MCNC: 1.5 MG/DL (ref 0.8–1.3)
CREAT SERPL-MCNC: 1.8 MG/DL (ref 0.8–1.3)
CREAT SERPL-MCNC: 1.9 MG/DL (ref 0.8–1.3)
CREAT SERPL-MCNC: 1.9 MG/DL (ref 0.8–1.3)
CREAT SERPL-MCNC: 2.4 MG/DL (ref 0.8–1.3)
CREAT SERPL-MCNC: 2.5 MG/DL (ref 0.8–1.3)
CREAT SERPL-MCNC: 2.8 MG/DL (ref 0.8–1.3)
CREAT SERPL-MCNC: 3.3 MG/DL (ref 0.8–1.3)
CREAT SERPL-MCNC: 3.4 MG/DL (ref 0.8–1.3)
CSF INTERPRETATION: NORMAL
CULTURE, BLOOD 2: ABNORMAL
CULTURE, BLOOD 2: ABNORMAL
DOHLE BODIES: PRESENT
EKG ATRIAL RATE: 108 BPM
EKG ATRIAL RATE: 220 BPM
EKG ATRIAL RATE: 63 BPM
EKG DIAGNOSIS: NORMAL
EKG P AXIS: 0 DEGREES
EKG P-R INTERVAL: 212 MS
EKG P-R INTERVAL: 250 MS
EKG Q-T INTERVAL: 306 MS
EKG Q-T INTERVAL: 346 MS
EKG Q-T INTERVAL: 420 MS
EKG QRS DURATION: 100 MS
EKG QRS DURATION: 112 MS
EKG QRS DURATION: 96 MS
EKG QTC CALCULATION (BAZETT): 423 MS
EKG QTC CALCULATION (BAZETT): 426 MS
EKG QTC CALCULATION (BAZETT): 429 MS
EKG R AXIS: -10 DEGREES
EKG R AXIS: -7 DEGREES
EKG R AXIS: 39 DEGREES
EKG T AXIS: 43 DEGREES
EKG T AXIS: 63 DEGREES
EKG T AXIS: 72 DEGREES
EKG VENTRICULAR RATE: 117 BPM
EKG VENTRICULAR RATE: 63 BPM
EKG VENTRICULAR RATE: 90 BPM
EOSINOPHILS ABSOLUTE: 0 K/UL (ref 0–0.6)
EOSINOPHILS ABSOLUTE: 0 K/UL (ref 0–0.6)
EOSINOPHILS ABSOLUTE: 0.2 K/UL (ref 0–0.6)
EOSINOPHILS ABSOLUTE: 0.2 K/UL (ref 0–0.6)
EOSINOPHILS ABSOLUTE: 0.3 K/UL (ref 0–0.6)
EOSINOPHILS ABSOLUTE: 0.4 K/UL (ref 0–0.6)
EOSINOPHILS RELATIVE PERCENT: 0 %
EOSINOPHILS RELATIVE PERCENT: 1 %
EOSINOPHILS RELATIVE PERCENT: 1.5 %
EOSINOPHILS RELATIVE PERCENT: 2 %
EOSINOPHILS RELATIVE PERCENT: 2.8 %
EOSINOPHILS RELATIVE PERCENT: 3.9 %
EPITHELIAL CELLS, UA: 2 /HPF (ref 0–5)
EPITHELIAL CELLS, UA: 2 /HPF (ref 0–5)
ESTIMATED AVERAGE GLUCOSE: 228.8 MG/DL
ETHANOL: NORMAL MG/DL (ref 0–0.08)
FOLATE: 10.08 NG/ML (ref 4.78–24.2)
GAMMA GLOBULIN: 1.1 G/DL (ref 0.6–1.8)
GFR AFRICAN AMERICAN: 21
GFR AFRICAN AMERICAN: 22
GFR AFRICAN AMERICAN: 26
GFR AFRICAN AMERICAN: 30
GFR AFRICAN AMERICAN: 32
GFR AFRICAN AMERICAN: 41
GFR AFRICAN AMERICAN: 41
GFR AFRICAN AMERICAN: 44
GFR AFRICAN AMERICAN: 54
GFR AFRICAN AMERICAN: 59
GFR AFRICAN AMERICAN: >60
GFR AFRICAN AMERICAN: >60
GFR NON-AFRICAN AMERICAN: 17
GFR NON-AFRICAN AMERICAN: 18
GFR NON-AFRICAN AMERICAN: 22
GFR NON-AFRICAN AMERICAN: 25
GFR NON-AFRICAN AMERICAN: 26
GFR NON-AFRICAN AMERICAN: 34
GFR NON-AFRICAN AMERICAN: 34
GFR NON-AFRICAN AMERICAN: 36
GFR NON-AFRICAN AMERICAN: 45
GFR NON-AFRICAN AMERICAN: 49
GFR NON-AFRICAN AMERICAN: 53
GFR NON-AFRICAN AMERICAN: >60
GLOBULIN: 2.2 G/DL
GLOBULIN: 2.4 G/DL
GLOBULIN: 2.8 G/DL
GLOBULIN: 2.9 G/DL
GLOBULIN: 2.9 G/DL
GLOBULIN: 3.1 G/DL
GLOBULIN: 3.4 G/DL
GLOBULIN: 3.6 G/DL
GLOBULIN: 3.6 G/DL
GLUCOSE BLD-MCNC: 101 MG/DL (ref 70–99)
GLUCOSE BLD-MCNC: 101 MG/DL (ref 70–99)
GLUCOSE BLD-MCNC: 102 MG/DL (ref 70–99)
GLUCOSE BLD-MCNC: 103 MG/DL (ref 70–99)
GLUCOSE BLD-MCNC: 112 MG/DL (ref 70–99)
GLUCOSE BLD-MCNC: 115 MG/DL (ref 70–99)
GLUCOSE BLD-MCNC: 122 MG/DL (ref 70–99)
GLUCOSE BLD-MCNC: 128 MG/DL (ref 70–99)
GLUCOSE BLD-MCNC: 139 MG/DL (ref 70–99)
GLUCOSE BLD-MCNC: 144 MG/DL (ref 70–99)
GLUCOSE BLD-MCNC: 150 MG/DL (ref 70–99)
GLUCOSE BLD-MCNC: 162 MG/DL (ref 70–99)
GLUCOSE BLD-MCNC: 181 MG/DL (ref 70–99)
GLUCOSE BLD-MCNC: 191 MG/DL (ref 70–99)
GLUCOSE BLD-MCNC: 195 MG/DL (ref 70–99)
GLUCOSE BLD-MCNC: 200 MG/DL (ref 70–99)
GLUCOSE BLD-MCNC: 202 MG/DL (ref 70–99)
GLUCOSE BLD-MCNC: 211 MG/DL (ref 70–99)
GLUCOSE BLD-MCNC: 216 MG/DL (ref 70–99)
GLUCOSE BLD-MCNC: 223 MG/DL (ref 70–99)
GLUCOSE BLD-MCNC: 232 MG/DL (ref 70–99)
GLUCOSE BLD-MCNC: 233 MG/DL (ref 70–99)
GLUCOSE BLD-MCNC: 243 MG/DL (ref 70–99)
GLUCOSE BLD-MCNC: 247 MG/DL (ref 70–99)
GLUCOSE BLD-MCNC: 247 MG/DL (ref 70–99)
GLUCOSE BLD-MCNC: 266 MG/DL (ref 70–99)
GLUCOSE BLD-MCNC: 272 MG/DL (ref 70–99)
GLUCOSE BLD-MCNC: 277 MG/DL (ref 70–99)
GLUCOSE BLD-MCNC: 281 MG/DL (ref 70–99)
GLUCOSE BLD-MCNC: 281 MG/DL (ref 70–99)
GLUCOSE BLD-MCNC: 282 MG/DL (ref 70–99)
GLUCOSE BLD-MCNC: 282 MG/DL (ref 70–99)
GLUCOSE BLD-MCNC: 287 MG/DL (ref 70–99)
GLUCOSE BLD-MCNC: 288 MG/DL (ref 70–99)
GLUCOSE BLD-MCNC: 289 MG/DL (ref 70–99)
GLUCOSE BLD-MCNC: 292 MG/DL (ref 70–99)
GLUCOSE BLD-MCNC: 307 MG/DL (ref 70–99)
GLUCOSE BLD-MCNC: 308 MG/DL (ref 70–99)
GLUCOSE BLD-MCNC: 312 MG/DL (ref 70–99)
GLUCOSE BLD-MCNC: 314 MG/DL (ref 70–99)
GLUCOSE BLD-MCNC: 325 MG/DL (ref 70–99)
GLUCOSE BLD-MCNC: 332 MG/DL (ref 70–99)
GLUCOSE BLD-MCNC: 335 MG/DL (ref 70–99)
GLUCOSE BLD-MCNC: 337 MG/DL (ref 70–99)
GLUCOSE BLD-MCNC: 337 MG/DL (ref 70–99)
GLUCOSE BLD-MCNC: 339 MG/DL (ref 70–99)
GLUCOSE BLD-MCNC: 348 MG/DL (ref 70–99)
GLUCOSE BLD-MCNC: 359 MG/DL (ref 70–99)
GLUCOSE BLD-MCNC: 372 MG/DL (ref 70–99)
GLUCOSE BLD-MCNC: 374 MG/DL (ref 70–99)
GLUCOSE BLD-MCNC: 387 MG/DL (ref 70–99)
GLUCOSE BLD-MCNC: 75 MG/DL (ref 70–99)
GLUCOSE BLD-MCNC: 86 MG/DL (ref 70–99)
GLUCOSE BLD-MCNC: 95 MG/DL (ref 70–99)
GLUCOSE BLD-MCNC: 98 MG/DL (ref 70–99)
GLUCOSE URINE: 500 MG/DL
GLUCOSE URINE: >=1000 MG/DL
GLUCOSE, CSF: 57 MG/DL (ref 40–80)
HBA1C MFR BLD: 8.3 %
HBA1C MFR BLD: 9.6 %
HCO3 ARTERIAL: 12.7 MMOL/L (ref 21–29)
HCO3 ARTERIAL: 14.1 MMOL/L (ref 21–29)
HCO3 ARTERIAL: 15.1 MMOL/L (ref 21–29)
HCO3 VENOUS: 15 MMOL/L (ref 23–29)
HCO3 VENOUS: 15.9 MMOL/L (ref 23–29)
HCO3 VENOUS: 16.9 MMOL/L (ref 23–29)
HCO3 VENOUS: 17.4 MMOL/L (ref 23–29)
HCO3 VENOUS: 8.5 MMOL/L (ref 23–29)
HCT VFR BLD CALC: 34.6 % (ref 40.5–52.5)
HCT VFR BLD CALC: 35.6 % (ref 40.5–52.5)
HCT VFR BLD CALC: 36.5 % (ref 40.5–52.5)
HCT VFR BLD CALC: 37.6 % (ref 40.5–52.5)
HCT VFR BLD CALC: 39 % (ref 40.5–52.5)
HCT VFR BLD CALC: 39.5 % (ref 40.5–52.5)
HCT VFR BLD CALC: 40.2 % (ref 40.5–52.5)
HCT VFR BLD CALC: 42 % (ref 40.5–52.5)
HEMOGLOBIN, ART, EXTENDED: 12.8 G/DL (ref 13.5–17.5)
HEMOGLOBIN, ART, EXTENDED: 13 G/DL (ref 13.5–17.5)
HEMOGLOBIN: 11.2 G/DL (ref 13.5–17.5)
HEMOGLOBIN: 11.3 G/DL (ref 13.5–17.5)
HEMOGLOBIN: 11.3 GM/DL (ref 13.5–17.5)
HEMOGLOBIN: 11.6 GM/DL (ref 13.5–17.5)
HEMOGLOBIN: 12 G/DL (ref 13.5–17.5)
HEMOGLOBIN: 12.3 G/DL (ref 13.5–17.5)
HEMOGLOBIN: 12.5 G/DL (ref 13.5–17.5)
HEMOGLOBIN: 12.8 G/DL (ref 13.5–17.5)
HEMOGLOBIN: 13.1 G/DL (ref 13.5–17.5)
HEMOGLOBIN: 14.1 G/DL (ref 13.5–17.5)
HEMOGLOBIN: 9.3 GM/DL (ref 13.5–17.5)
HERPES SIMPLEX VIRUS BY PCR: NOT DETECTED
HSV SOURCE: NORMAL
HYALINE CASTS: 10 /LPF (ref 0–8)
HYALINE CASTS: 3 /LPF (ref 0–8)
IGG CSF: 7.8 MG/DL (ref 0–6)
IGG INDEX: 0.46 (ref 0.2–0.7)
IGG SYNTHESIS RATE CSF: -2.1 MG/DAY (ref -9.9–3.3)
IGG: 1116 MG/DL (ref 700–1600)
INR BLD: 1.01 (ref 0.86–1.14)
INR BLD: 1.22 (ref 0.86–1.14)
KETONES, URINE: ABNORMAL MG/DL
KETONES, URINE: ABNORMAL MG/DL
LACTATE: 12.95 MMOL/L (ref 0.4–2)
LACTATE: 13.4 MMOL/L (ref 0.4–2)
LACTATE: 18.26 MMOL/L (ref 0.4–2)
LACTATE: 3.72 MMOL/L (ref 0.4–2)
LACTIC ACID, SEPSIS: 7.6 MMOL/L (ref 0.4–1.9)
LACTIC ACID: 1.9 MMOL/L (ref 0.4–2)
LACTIC ACID: 11.1 MMOL/L (ref 0.4–2)
LACTIC ACID: 3.5 MMOL/L (ref 0.4–2)
LACTIC ACID: 3.7 MMOL/L (ref 0.4–2)
LACTIC ACID: 4.7 MMOL/L (ref 0.4–2)
LACTIC ACID: 6.5 MMOL/L (ref 0.4–2)
LEUKOCYTE ESTERASE, URINE: ABNORMAL
LEUKOCYTE ESTERASE, URINE: NEGATIVE
LIPASE: 69 U/L (ref 13–60)
LYMPHOCYTES ABSOLUTE: 0.4 K/UL (ref 1–5.1)
LYMPHOCYTES ABSOLUTE: 0.8 K/UL (ref 1–5.1)
LYMPHOCYTES ABSOLUTE: 1.7 K/UL (ref 1–5.1)
LYMPHOCYTES ABSOLUTE: 1.9 K/UL (ref 1–5.1)
LYMPHOCYTES ABSOLUTE: 2 K/UL (ref 1–5.1)
LYMPHOCYTES ABSOLUTE: 2.1 K/UL (ref 1–5.1)
LYMPHOCYTES RELATIVE PERCENT: 10 %
LYMPHOCYTES RELATIVE PERCENT: 14.4 %
LYMPHOCYTES RELATIVE PERCENT: 16.8 %
LYMPHOCYTES RELATIVE PERCENT: 19.1 %
LYMPHOCYTES RELATIVE PERCENT: 2 %
LYMPHOCYTES RELATIVE PERCENT: 20.5 %
Lab: NORMAL
MAGNESIUM: 1.5 MG/DL (ref 1.8–2.4)
MAGNESIUM: 1.7 MG/DL (ref 1.8–2.4)
MAGNESIUM: 1.8 MG/DL (ref 1.8–2.4)
MAGNESIUM: 1.9 MG/DL (ref 1.8–2.4)
MAGNESIUM: 2.1 MG/DL (ref 1.8–2.4)
MAGNESIUM: 2.1 MG/DL (ref 1.8–2.4)
MCH RBC QN AUTO: 27.7 PG (ref 26–34)
MCH RBC QN AUTO: 28.3 PG (ref 26–34)
MCH RBC QN AUTO: 28.4 PG (ref 26–34)
MCH RBC QN AUTO: 28.4 PG (ref 26–34)
MCH RBC QN AUTO: 28.5 PG (ref 26–34)
MCH RBC QN AUTO: 28.9 PG (ref 26–34)
MCH RBC QN AUTO: 29.2 PG (ref 26–34)
MCHC RBC AUTO-ENTMCNC: 31.6 G/DL (ref 31–36)
MCHC RBC AUTO-ENTMCNC: 32.1 G/DL (ref 31–36)
MCHC RBC AUTO-ENTMCNC: 32.5 G/DL (ref 31–36)
MCHC RBC AUTO-ENTMCNC: 32.6 G/DL (ref 31–36)
MCHC RBC AUTO-ENTMCNC: 32.8 G/DL (ref 31–36)
MCHC RBC AUTO-ENTMCNC: 32.8 G/DL (ref 31–36)
MCHC RBC AUTO-ENTMCNC: 33.5 G/DL (ref 31–36)
MCV RBC AUTO: 86.7 FL (ref 80–100)
MCV RBC AUTO: 86.8 FL (ref 80–100)
MCV RBC AUTO: 87.1 FL (ref 80–100)
MCV RBC AUTO: 87.6 FL (ref 80–100)
MCV RBC AUTO: 87.7 FL (ref 80–100)
MCV RBC AUTO: 88.4 FL (ref 80–100)
MCV RBC AUTO: 88.9 FL (ref 80–100)
MENINGITIS ENCEPHALITIS PANEL: NORMAL
METHADONE SCREEN, URINE: NORMAL
METHEMOGLOBIN ARTERIAL: 0 %
METHEMOGLOBIN ARTERIAL: 0.2 %
METHEMOGLOBIN VENOUS: 0.1 %
METHYLMALONIC ACID: 0.21 UMOL/L (ref 0–0.4)
MICROSCOPIC EXAMINATION: YES
MICROSCOPIC EXAMINATION: YES
MONOCYTES ABSOLUTE: 0 K/UL (ref 0–1.3)
MONOCYTES ABSOLUTE: 0.4 K/UL (ref 0–1.3)
MONOCYTES ABSOLUTE: 1 K/UL (ref 0–1.3)
MONOCYTES ABSOLUTE: 1 K/UL (ref 0–1.3)
MONOCYTES ABSOLUTE: 1.2 K/UL (ref 0–1.3)
MONOCYTES ABSOLUTE: 1.2 K/UL (ref 0–1.3)
MONOCYTES RELATIVE PERCENT: 0 %
MONOCYTES RELATIVE PERCENT: 1 %
MONOCYTES RELATIVE PERCENT: 10.1 %
MONOCYTES RELATIVE PERCENT: 11.3 %
MONOCYTES RELATIVE PERCENT: 9 %
MONOCYTES RELATIVE PERCENT: 9.8 %
MYELIN BASIC PROTEIN, CSF: 4.48 NG/ML (ref 0–5.5)
MYELOCYTE PERCENT: 2 %
NEUTROPHILS ABSOLUTE: 3.9 K/UL (ref 1.7–7.7)
NEUTROPHILS ABSOLUTE: 38.3 K/UL (ref 1.7–7.7)
NEUTROPHILS ABSOLUTE: 5.8 K/UL (ref 1.7–7.7)
NEUTROPHILS ABSOLUTE: 7.3 K/UL (ref 1.7–7.7)
NEUTROPHILS ABSOLUTE: 8.3 K/UL (ref 1.7–7.7)
NEUTROPHILS ABSOLUTE: 8.6 K/UL (ref 1.7–7.7)
NEUTROPHILS RELATIVE PERCENT: 56 %
NEUTROPHILS RELATIVE PERCENT: 63.4 %
NEUTROPHILS RELATIVE PERCENT: 68.1 %
NEUTROPHILS RELATIVE PERCENT: 70.9 %
NEUTROPHILS RELATIVE PERCENT: 72.5 %
NEUTROPHILS RELATIVE PERCENT: 81 %
NITRITE, URINE: NEGATIVE
NITRITE, URINE: POSITIVE
NO DIFFERENTIAL CSF: NORMAL
O2 CONTENT ARTERIAL: 17 ML/DL
O2 CONTENT ARTERIAL: 18 ML/DL
O2 CONTENT, VEN: 17 VOL %
O2 SAT, ARTERIAL: 84 % (ref 93–100)
O2 SAT, ARTERIAL: 93 %
O2 SAT, ARTERIAL: 99.2 %
O2 SAT, VEN: 47 %
O2 SAT, VEN: 81 %
O2 SAT, VEN: 82 %
O2 SAT, VEN: 91 %
O2 SAT, VEN: 99 %
O2 THERAPY: ABNORMAL
OLIGOCLONAL BANDS CSF: 0
OLIGOCLONAL BANDS: 0
OPIATE SCREEN URINE: NORMAL
ORGANISM: ABNORMAL
OXYCODONE URINE: NORMAL
PCO2 ARTERIAL: 31.4 MMHG (ref 35–45)
PCO2 ARTERIAL: 40.5 MMHG (ref 35–45)
PCO2 ARTERIAL: 52.2 MM HG (ref 35–45)
PCO2, VEN: 33 MMHG (ref 40–50)
PCO2, VEN: 40 MM HG (ref 40–50)
PCO2, VEN: 40.9 MM HG (ref 40–50)
PCO2, VEN: 42.8 MM HG (ref 40–50)
PCO2, VEN: 48.6 MM HG (ref 40–50)
PDW BLD-RTO: 13.4 % (ref 12.4–15.4)
PDW BLD-RTO: 13.5 % (ref 12.4–15.4)
PDW BLD-RTO: 13.6 % (ref 12.4–15.4)
PDW BLD-RTO: 13.6 % (ref 12.4–15.4)
PDW BLD-RTO: 13.7 % (ref 12.4–15.4)
PDW BLD-RTO: 14.1 % (ref 12.4–15.4)
PDW BLD-RTO: 14.2 % (ref 12.4–15.4)
PERFORMED ON: ABNORMAL
PERFORMED ON: NORMAL
PERFORMED ON: NORMAL
PH ARTERIAL: 7.07 (ref 7.35–7.45)
PH ARTERIAL: 7.15 (ref 7.35–7.45)
PH ARTERIAL: 7.21 (ref 7.35–7.45)
PH UA: 5.5
PH UA: 5.5 (ref 5–8)
PH UA: 5.5 (ref 5–8)
PH VENOUS: 6.93 (ref 7.35–7.45)
PH VENOUS: 7.12 (ref 7.35–7.45)
PH VENOUS: 7.12 (ref 7.35–7.45)
PH VENOUS: 7.13 (ref 7.35–7.45)
PH VENOUS: 7.16 (ref 7.35–7.45)
PH VENOUS: 7.17 (ref 7.35–7.45)
PH VENOUS: 7.17 (ref 7.35–7.45)
PH VENOUS: 7.22 (ref 7.35–7.45)
PH VENOUS: 7.32 (ref 7.35–7.45)
PHENCYCLIDINE SCREEN URINE: NORMAL
PHOSPHORUS: 0.5 MG/DL (ref 2.5–4.9)
PHOSPHORUS: 2.7 MG/DL (ref 2.5–4.9)
PHOSPHORUS: 4.9 MG/DL (ref 2.5–4.9)
PHOSPHORUS: 5.9 MG/DL (ref 2.5–4.9)
PHOSPHORUS: 5.9 MG/DL (ref 2.5–4.9)
PHOSPHORUS: 8.1 MG/DL (ref 2.5–4.9)
PHOSPHORUS: 8.1 MG/DL (ref 2.5–4.9)
PHOSPHORUS: 8.2 MG/DL (ref 2.5–4.9)
PLATELET # BLD: 118 K/UL (ref 135–450)
PLATELET # BLD: 163 K/UL (ref 135–450)
PLATELET # BLD: 166 K/UL (ref 135–450)
PLATELET # BLD: 175 K/UL (ref 135–450)
PLATELET # BLD: 203 K/UL (ref 135–450)
PLATELET # BLD: 60 K/UL (ref 135–450)
PLATELET # BLD: 84 K/UL (ref 135–450)
PLATELET SLIDE REVIEW: ABNORMAL
PMV BLD AUTO: 10.2 FL (ref 5–10.5)
PMV BLD AUTO: 10.7 FL (ref 5–10.5)
PMV BLD AUTO: 8.1 FL (ref 5–10.5)
PMV BLD AUTO: 8.3 FL (ref 5–10.5)
PMV BLD AUTO: 8.5 FL (ref 5–10.5)
PMV BLD AUTO: 8.5 FL (ref 5–10.5)
PMV BLD AUTO: 8.7 FL (ref 5–10.5)
PO2 ARTERIAL: 142 MMHG (ref 75–108)
PO2 ARTERIAL: 68.6 MM HG (ref 75–108)
PO2 ARTERIAL: 72.6 MMHG (ref 75–108)
PO2, VEN: 133 MMHG (ref 25–40)
PO2, VEN: 41 MM HG
PO2, VEN: 55 MM HG
PO2, VEN: 59 MM HG
PO2, VEN: 75 MM HG
POC HEMATOCRIT: 27 % (ref 40.5–52.5)
POC HEMATOCRIT: 33 % (ref 40.5–52.5)
POC HEMATOCRIT: 34 % (ref 40.5–52.5)
POC POTASSIUM: 3.5 MMOL/L (ref 3.5–5.1)
POC POTASSIUM: 4.9 MMOL/L (ref 3.5–5.1)
POC POTASSIUM: 6.5 MMOL/L (ref 3.5–5.1)
POC SAMPLE TYPE: ABNORMAL
POC SODIUM: 118 MMOL/L (ref 136–145)
POC SODIUM: 128 MMOL/L (ref 136–145)
POC SODIUM: 139 MMOL/L (ref 136–145)
POTASSIUM REFLEX MAGNESIUM: 4.1 MMOL/L (ref 3.5–5.1)
POTASSIUM REFLEX MAGNESIUM: 4.3 MMOL/L (ref 3.5–5.1)
POTASSIUM REFLEX MAGNESIUM: 4.5 MMOL/L (ref 3.5–5.1)
POTASSIUM REFLEX MAGNESIUM: 7 MMOL/L (ref 3.5–5.1)
POTASSIUM SERPL-SCNC: 3.3 MMOL/L (ref 3.5–5.1)
POTASSIUM SERPL-SCNC: 3.6 MMOL/L (ref 3.5–5.1)
POTASSIUM SERPL-SCNC: 3.8 MMOL/L (ref 3.5–5.1)
POTASSIUM SERPL-SCNC: 3.9 MMOL/L (ref 3.5–5.1)
POTASSIUM SERPL-SCNC: 3.9 MMOL/L (ref 3.5–5.1)
POTASSIUM SERPL-SCNC: 6.7 MMOL/L (ref 3.5–5.1)
POTASSIUM SERPL-SCNC: 6.8 MMOL/L (ref 3.5–5.1)
POTASSIUM SERPL-SCNC: 7 MMOL/L (ref 3.5–5.1)
PROCALCITONIN: 12.49 NG/ML (ref 0–0.15)
PROPOXYPHENE SCREEN: NORMAL
PROTEIN CSF: 74 MG/DL (ref 15–45)
PROTEIN CSF: 76 MG/DL (ref 15–45)
PROTEIN UA: 100 MG/DL
PROTEIN UA: >=300 MG/DL
PROTHROMBIN TIME: 11.7 SEC (ref 10–13.2)
PROTHROMBIN TIME: 14.2 SEC (ref 10–13.2)
RBC # BLD: 4.06 M/UL (ref 4.2–5.9)
RBC # BLD: 4.22 M/UL (ref 4.2–5.9)
RBC # BLD: 4.33 M/UL (ref 4.2–5.9)
RBC # BLD: 4.41 M/UL (ref 4.2–5.9)
RBC # BLD: 4.51 M/UL (ref 4.2–5.9)
RBC # BLD: 4.52 M/UL (ref 4.2–5.9)
RBC # BLD: 4.82 M/UL (ref 4.2–5.9)
RBC CSF: 0 /CUMM
RBC UA: 4 /HPF (ref 0–4)
RBC UA: >100 /HPF (ref 0–4)
REASON FOR REJECTION: NORMAL
REJECTED TEST: NORMAL
REPORT: NORMAL
REPORT: NORMAL
RHEUMATOID FACTOR: <10 IU/ML
SALICYLATE, SERUM: 0.4 MG/DL (ref 15–30)
SARS-COV-2, NAA: NOT DETECTED
SARS-COV-2, NAAT: NOT DETECTED
SEDIMENTATION RATE, ERYTHROCYTE: 16 MM/HR (ref 0–20)
SLIDE REVIEW: ABNORMAL
SODIUM BLD-SCNC: 127 MMOL/L (ref 136–145)
SODIUM BLD-SCNC: 129 MMOL/L (ref 136–145)
SODIUM BLD-SCNC: 130 MMOL/L (ref 136–145)
SODIUM BLD-SCNC: 130 MMOL/L (ref 136–145)
SODIUM BLD-SCNC: 132 MMOL/L (ref 136–145)
SODIUM BLD-SCNC: 133 MMOL/L (ref 136–145)
SODIUM BLD-SCNC: 133 MMOL/L (ref 136–145)
SODIUM BLD-SCNC: 137 MMOL/L (ref 136–145)
SODIUM BLD-SCNC: 137 MMOL/L (ref 136–145)
SODIUM BLD-SCNC: 138 MMOL/L (ref 136–145)
SODIUM BLD-SCNC: 139 MMOL/L (ref 136–145)
SODIUM BLD-SCNC: 142 MMOL/L (ref 136–145)
SPE/IFE INTERPRETATION: NORMAL
SPECIFIC GRAVITY UA: 1.01 (ref 1–1.03)
SPECIFIC GRAVITY UA: 1.02 (ref 1–1.03)
TCO2 ARTERIAL: 17 MMOL/L
TCO2 ARTERIAL: 30.5 MMOL/L
TCO2 ARTERIAL: 34.4 MMOL/L
TCO2 CALC VENOUS: 10 MMOL/L
TCO2 CALC VENOUS: 16 MMOL/L
TCO2 CALC VENOUS: 17 MMOL/L
TCO2 CALC VENOUS: 19 MMOL/L
TCO2 CALC VENOUS: 40 MMOL/L
TOTAL CK: 888 U/L (ref 39–308)
TOTAL PROTEIN: 3.3 G/DL (ref 6.4–8.2)
TOTAL PROTEIN: 4 G/DL (ref 6.4–8.2)
TOTAL PROTEIN: 4.8 G/DL (ref 6.4–8.2)
TOTAL PROTEIN: 5.1 G/DL (ref 6.4–8.2)
TOTAL PROTEIN: 5.2 G/DL (ref 6.4–8.2)
TOTAL PROTEIN: 5.7 G/DL (ref 6.4–8.2)
TOTAL PROTEIN: 5.8 G/DL (ref 6.4–8.2)
TOTAL PROTEIN: 6 G/DL (ref 6.4–8.2)
TOTAL PROTEIN: 6.2 G/DL (ref 6.4–8.2)
TOTAL PROTEIN: 7.6 G/DL (ref 6.4–8.2)
TRIGL SERPL-MCNC: 481 MG/DL (ref 0–150)
TRIGL SERPL-MCNC: 653 MG/DL (ref 0–150)
TROPONIN: 0.01 NG/ML
TSH REFLEX: 1.12 UIU/ML (ref 0.27–4.2)
TUBE NUMBER CSF: NORMAL
TUBE NUMBER CSF: NORMAL
URINE CULTURE, ROUTINE: ABNORMAL
URINE REFLEX TO CULTURE: ABNORMAL
URINE REFLEX TO CULTURE: YES
URINE TYPE: ABNORMAL
URINE TYPE: ABNORMAL
UROBILINOGEN, URINE: 1 E.U./DL
UROBILINOGEN, URINE: 1 E.U./DL
VANCOMYCIN RANDOM: 7 UG/ML
VITAMIN B-12: 244 PG/ML (ref 211–911)
VITAMIN B-12: 260 PG/ML (ref 211–911)
WBC # BLD: 10.8 K/UL (ref 4–11)
WBC # BLD: 11.5 K/UL (ref 4–11)
WBC # BLD: 12.1 K/UL (ref 4–11)
WBC # BLD: 38.7 K/UL (ref 4–11)
WBC # BLD: 39.5 K/UL (ref 4–11)
WBC # BLD: 4.4 K/UL (ref 4–11)
WBC # BLD: 9.1 K/UL (ref 4–11)
WBC CSF: 0 /CUMM (ref 0–5)
WBC UA: 29 /HPF (ref 0–5)
WBC UA: 3 /HPF (ref 0–5)

## 2020-01-01 PROCEDURE — 6360000002 HC RX W HCPCS: Performed by: INTERNAL MEDICINE

## 2020-01-01 PROCEDURE — 95816 EEG AWAKE AND DROWSY: CPT | Performed by: PSYCHIATRY & NEUROLOGY

## 2020-01-01 PROCEDURE — 82164 ANGIOTENSIN I ENZYME TEST: CPT

## 2020-01-01 PROCEDURE — 2580000003 HC RX 258: Performed by: INTERNAL MEDICINE

## 2020-01-01 PROCEDURE — 80053 COMPREHEN METABOLIC PANEL: CPT

## 2020-01-01 PROCEDURE — 2500000003 HC RX 250 WO HCPCS: Performed by: HOSPITALIST

## 2020-01-01 PROCEDURE — 86431 RHEUMATOID FACTOR QUANT: CPT

## 2020-01-01 PROCEDURE — 89050 BODY FLUID CELL COUNT: CPT

## 2020-01-01 PROCEDURE — 87186 SC STD MICRODIL/AGAR DIL: CPT

## 2020-01-01 PROCEDURE — 2700000000 HC OXYGEN THERAPY PER DAY

## 2020-01-01 PROCEDURE — 82803 BLOOD GASES ANY COMBINATION: CPT

## 2020-01-01 PROCEDURE — 71045 X-RAY EXAM CHEST 1 VIEW: CPT

## 2020-01-01 PROCEDURE — 2500000003 HC RX 250 WO HCPCS: Performed by: INTERNAL MEDICINE

## 2020-01-01 PROCEDURE — 2000000000 HC ICU R&B

## 2020-01-01 PROCEDURE — 84100 ASSAY OF PHOSPHORUS: CPT

## 2020-01-01 PROCEDURE — 95819 EEG AWAKE AND ASLEEP: CPT

## 2020-01-01 PROCEDURE — 82945 GLUCOSE OTHER FLUID: CPT

## 2020-01-01 PROCEDURE — 6370000000 HC RX 637 (ALT 250 FOR IP): Performed by: PHYSICIAN ASSISTANT

## 2020-01-01 PROCEDURE — 2580000003 HC RX 258: Performed by: STUDENT IN AN ORGANIZED HEALTH CARE EDUCATION/TRAINING PROGRAM

## 2020-01-01 PROCEDURE — 82042 OTHER SOURCE ALBUMIN QUAN EA: CPT

## 2020-01-01 PROCEDURE — 87150 DNA/RNA AMPLIFIED PROBE: CPT

## 2020-01-01 PROCEDURE — 83605 ASSAY OF LACTIC ACID: CPT

## 2020-01-01 PROCEDURE — 36415 COLL VENOUS BLD VENIPUNCTURE: CPT

## 2020-01-01 PROCEDURE — 72125 CT NECK SPINE W/O DYE: CPT

## 2020-01-01 PROCEDURE — 1123F ACP DISCUSS/DSCN MKR DOCD: CPT | Performed by: INTERNAL MEDICINE

## 2020-01-01 PROCEDURE — 97530 THERAPEUTIC ACTIVITIES: CPT

## 2020-01-01 PROCEDURE — U0003 INFECTIOUS AGENT DETECTION BY NUCLEIC ACID (DNA OR RNA); SEVERE ACUTE RESPIRATORY SYNDROME CORONAVIRUS 2 (SARS-COV-2) (CORONAVIRUS DISEASE [COVID-19]), AMPLIFIED PROBE TECHNIQUE, MAKING USE OF HIGH THROUGHPUT TECHNOLOGIES AS DESCRIBED BY CMS-2020-01-R: HCPCS

## 2020-01-01 PROCEDURE — 99233 SBSQ HOSP IP/OBS HIGH 50: CPT | Performed by: PSYCHIATRY & NEUROLOGY

## 2020-01-01 PROCEDURE — 85025 COMPLETE CBC W/AUTO DIFF WBC: CPT

## 2020-01-01 PROCEDURE — 97166 OT EVAL MOD COMPLEX 45 MIN: CPT

## 2020-01-01 PROCEDURE — 83735 ASSAY OF MAGNESIUM: CPT

## 2020-01-01 PROCEDURE — 85730 THROMBOPLASTIN TIME PARTIAL: CPT

## 2020-01-01 PROCEDURE — 87040 BLOOD CULTURE FOR BACTERIA: CPT

## 2020-01-01 PROCEDURE — 87070 CULTURE OTHR SPECIMN AEROBIC: CPT

## 2020-01-01 PROCEDURE — 00JU3ZZ INSPECTION OF SPINAL CANAL, PERCUTANEOUS APPROACH: ICD-10-PCS | Performed by: RADIOLOGY

## 2020-01-01 PROCEDURE — 6370000000 HC RX 637 (ALT 250 FOR IP): Performed by: INTERNAL MEDICINE

## 2020-01-01 PROCEDURE — 2580000003 HC RX 258: Performed by: HOSPITALIST

## 2020-01-01 PROCEDURE — 70450 CT HEAD/BRAIN W/O DYE: CPT

## 2020-01-01 PROCEDURE — 87086 URINE CULTURE/COLONY COUNT: CPT

## 2020-01-01 PROCEDURE — 90945 DIALYSIS ONE EVALUATION: CPT

## 2020-01-01 PROCEDURE — 86255 FLUORESCENT ANTIBODY SCREEN: CPT

## 2020-01-01 PROCEDURE — 6360000002 HC RX W HCPCS

## 2020-01-01 PROCEDURE — 82040 ASSAY OF SERUM ALBUMIN: CPT

## 2020-01-01 PROCEDURE — 93010 ELECTROCARDIOGRAM REPORT: CPT | Performed by: INTERNAL MEDICINE

## 2020-01-01 PROCEDURE — 84295 ASSAY OF SERUM SODIUM: CPT

## 2020-01-01 PROCEDURE — 85018 HEMOGLOBIN: CPT

## 2020-01-01 PROCEDURE — 84165 PROTEIN E-PHORESIS SERUM: CPT

## 2020-01-01 PROCEDURE — 1200000000 HC SEMI PRIVATE

## 2020-01-01 PROCEDURE — 97116 GAIT TRAINING THERAPY: CPT

## 2020-01-01 PROCEDURE — 84478 ASSAY OF TRIGLYCERIDES: CPT

## 2020-01-01 PROCEDURE — 70551 MRI BRAIN STEM W/O DYE: CPT

## 2020-01-01 PROCEDURE — 99291 CRITICAL CARE FIRST HOUR: CPT | Performed by: INTERNAL MEDICINE

## 2020-01-01 PROCEDURE — 93005 ELECTROCARDIOGRAM TRACING: CPT | Performed by: STUDENT IN AN ORGANIZED HEALTH CARE EDUCATION/TRAINING PROGRAM

## 2020-01-01 PROCEDURE — 94770 HC ETCO2 MONITOR DAILY: CPT

## 2020-01-01 PROCEDURE — 83873 ASSAY OF CSF PROTEIN: CPT

## 2020-01-01 PROCEDURE — 2580000003 HC RX 258: Performed by: RADIOLOGY

## 2020-01-01 PROCEDURE — 1036F TOBACCO NON-USER: CPT | Performed by: INTERNAL MEDICINE

## 2020-01-01 PROCEDURE — 2580000003 HC RX 258: Performed by: PHYSICIAN ASSISTANT

## 2020-01-01 PROCEDURE — 36600 WITHDRAWAL OF ARTERIAL BLOOD: CPT

## 2020-01-01 PROCEDURE — 97161 PT EVAL LOW COMPLEX 20 MIN: CPT

## 2020-01-01 PROCEDURE — 93005 ELECTROCARDIOGRAM TRACING: CPT | Performed by: EMERGENCY MEDICINE

## 2020-01-01 PROCEDURE — 82746 ASSAY OF FOLIC ACID SERUM: CPT

## 2020-01-01 PROCEDURE — 82330 ASSAY OF CALCIUM: CPT

## 2020-01-01 PROCEDURE — 82140 ASSAY OF AMMONIA: CPT

## 2020-01-01 PROCEDURE — 85027 COMPLETE CBC AUTOMATED: CPT

## 2020-01-01 PROCEDURE — 83690 ASSAY OF LIPASE: CPT

## 2020-01-01 PROCEDURE — 86341 ISLET CELL ANTIBODY: CPT

## 2020-01-01 PROCEDURE — 81001 URINALYSIS AUTO W/SCOPE: CPT

## 2020-01-01 PROCEDURE — G0480 DRUG TEST DEF 1-7 CLASSES: HCPCS

## 2020-01-01 PROCEDURE — 5A1945Z RESPIRATORY VENTILATION, 24-96 CONSECUTIVE HOURS: ICD-10-PCS | Performed by: INTERNAL MEDICINE

## 2020-01-01 PROCEDURE — 86038 ANTINUCLEAR ANTIBODIES: CPT

## 2020-01-01 PROCEDURE — 2500000003 HC RX 250 WO HCPCS: Performed by: STUDENT IN AN ORGANIZED HEALTH CARE EDUCATION/TRAINING PROGRAM

## 2020-01-01 PROCEDURE — 93005 ELECTROCARDIOGRAM TRACING: CPT | Performed by: INTERNAL MEDICINE

## 2020-01-01 PROCEDURE — 84155 ASSAY OF PROTEIN SERUM: CPT

## 2020-01-01 PROCEDURE — 85610 PROTHROMBIN TIME: CPT

## 2020-01-01 PROCEDURE — 80048 BASIC METABOLIC PNL TOTAL CA: CPT

## 2020-01-01 PROCEDURE — 80307 DRUG TEST PRSMV CHEM ANLYZR: CPT

## 2020-01-01 PROCEDURE — 87483 CNS DNA AMP PROBE TYPE 12-25: CPT

## 2020-01-01 PROCEDURE — C9113 INJ PANTOPRAZOLE SODIUM, VIA: HCPCS | Performed by: INTERNAL MEDICINE

## 2020-01-01 PROCEDURE — 86140 C-REACTIVE PROTEIN: CPT

## 2020-01-01 PROCEDURE — 85652 RBC SED RATE AUTOMATED: CPT

## 2020-01-01 PROCEDURE — G8417 CALC BMI ABV UP PARAM F/U: HCPCS | Performed by: INTERNAL MEDICINE

## 2020-01-01 PROCEDURE — U0002 COVID-19 LAB TEST NON-CDC: HCPCS

## 2020-01-01 PROCEDURE — 94760 N-INVAS EAR/PLS OXIMETRY 1: CPT

## 2020-01-01 PROCEDURE — 51702 INSERT TEMP BLADDER CATH: CPT

## 2020-01-01 PROCEDURE — 36569 INSJ PICC 5 YR+ W/O IMAGING: CPT

## 2020-01-01 PROCEDURE — 88112 CYTOPATH CELL ENHANCE TECH: CPT

## 2020-01-01 PROCEDURE — 84145 PROCALCITONIN (PCT): CPT

## 2020-01-01 PROCEDURE — 82947 ASSAY GLUCOSE BLOOD QUANT: CPT

## 2020-01-01 PROCEDURE — 82784 ASSAY IGA/IGD/IGG/IGM EACH: CPT

## 2020-01-01 PROCEDURE — 97535 SELF CARE MNGMENT TRAINING: CPT

## 2020-01-01 PROCEDURE — 99285 EMERGENCY DEPT VISIT HI MDM: CPT

## 2020-01-01 PROCEDURE — 87641 MR-STAPH DNA AMP PROBE: CPT

## 2020-01-01 PROCEDURE — 87205 SMEAR GRAM STAIN: CPT

## 2020-01-01 PROCEDURE — 36556 INSERT NON-TUNNEL CV CATH: CPT | Performed by: INTERNAL MEDICINE

## 2020-01-01 PROCEDURE — 80202 ASSAY OF VANCOMYCIN: CPT

## 2020-01-01 PROCEDURE — 94750 HC PULMONARY COMPLIANCE STUDY: CPT

## 2020-01-01 PROCEDURE — G8484 FLU IMMUNIZE NO ADMIN: HCPCS | Performed by: INTERNAL MEDICINE

## 2020-01-01 PROCEDURE — 94003 VENT MGMT INPAT SUBQ DAY: CPT

## 2020-01-01 PROCEDURE — 6360000002 HC RX W HCPCS: Performed by: STUDENT IN AN ORGANIZED HEALTH CARE EDUCATION/TRAINING PROGRAM

## 2020-01-01 PROCEDURE — 84484 ASSAY OF TROPONIN QUANT: CPT

## 2020-01-01 PROCEDURE — 85014 HEMATOCRIT: CPT

## 2020-01-01 PROCEDURE — 82550 ASSAY OF CK (CPK): CPT

## 2020-01-01 PROCEDURE — 82607 VITAMIN B-12: CPT

## 2020-01-01 PROCEDURE — 4040F PNEUMOC VAC/ADMIN/RCVD: CPT | Performed by: INTERNAL MEDICINE

## 2020-01-01 PROCEDURE — 36556 INSERT NON-TUNNEL CV CATH: CPT

## 2020-01-01 PROCEDURE — 76770 US EXAM ABDO BACK WALL COMP: CPT

## 2020-01-01 PROCEDURE — 84132 ASSAY OF SERUM POTASSIUM: CPT

## 2020-01-01 PROCEDURE — 62328 DX LMBR SPI PNXR W/FLUOR/CT: CPT

## 2020-01-01 PROCEDURE — 83036 HEMOGLOBIN GLYCOSYLATED A1C: CPT

## 2020-01-01 PROCEDURE — C1751 CATH, INF, PER/CENT/MIDLINE: HCPCS

## 2020-01-01 PROCEDURE — 0BH17EZ INSERTION OF ENDOTRACHEAL AIRWAY INTO TRACHEA, VIA NATURAL OR ARTIFICIAL OPENING: ICD-10-PCS | Performed by: INTERNAL MEDICINE

## 2020-01-01 PROCEDURE — 87077 CULTURE AEROBIC IDENTIFY: CPT

## 2020-01-01 PROCEDURE — 2500000003 HC RX 250 WO HCPCS: Performed by: PSYCHIATRY & NEUROLOGY

## 2020-01-01 PROCEDURE — 99223 1ST HOSP IP/OBS HIGH 75: CPT | Performed by: PSYCHIATRY & NEUROLOGY

## 2020-01-01 PROCEDURE — 96365 THER/PROPH/DIAG IV INF INIT: CPT

## 2020-01-01 PROCEDURE — 83916 OLIGOCLONAL BANDS: CPT

## 2020-01-01 PROCEDURE — 83921 ORGANIC ACID SINGLE QUANT: CPT

## 2020-01-01 PROCEDURE — 2500000003 HC RX 250 WO HCPCS

## 2020-01-01 PROCEDURE — 99214 OFFICE O/P EST MOD 30 MIN: CPT | Performed by: INTERNAL MEDICINE

## 2020-01-01 PROCEDURE — 99232 SBSQ HOSP IP/OBS MODERATE 35: CPT | Performed by: PSYCHIATRY & NEUROLOGY

## 2020-01-01 PROCEDURE — 84443 ASSAY THYROID STIM HORMONE: CPT

## 2020-01-01 PROCEDURE — 84157 ASSAY OF PROTEIN OTHER: CPT

## 2020-01-01 PROCEDURE — 83036 HEMOGLOBIN GLYCOSYLATED A1C: CPT | Performed by: INTERNAL MEDICINE

## 2020-01-01 PROCEDURE — 02HV33Z INSERTION OF INFUSION DEVICE INTO SUPERIOR VENA CAVA, PERCUTANEOUS APPROACH: ICD-10-PCS | Performed by: INTERNAL MEDICINE

## 2020-01-01 PROCEDURE — 94002 VENT MGMT INPAT INIT DAY: CPT

## 2020-01-01 PROCEDURE — G8427 DOCREV CUR MEDS BY ELIG CLIN: HCPCS | Performed by: INTERNAL MEDICINE

## 2020-01-01 PROCEDURE — 94761 N-INVAS EAR/PLS OXIMETRY MLT: CPT

## 2020-01-01 RX ORDER — SODIUM CHLORIDE 0.9 % (FLUSH) 0.9 %
10 SYRINGE (ML) INJECTION PRN
Status: DISCONTINUED | OUTPATIENT
Start: 2020-01-01 | End: 2020-01-01 | Stop reason: HOSPADM

## 2020-01-01 RX ORDER — SODIUM CHLORIDE 0.9 % (FLUSH) 0.9 %
10 SYRINGE (ML) INJECTION EVERY 12 HOURS SCHEDULED
Status: DISCONTINUED | OUTPATIENT
Start: 2020-01-01 | End: 2020-01-01 | Stop reason: HOSPADM

## 2020-01-01 RX ORDER — PROPOFOL 10 MG/ML
INJECTION, EMULSION INTRAVENOUS
Status: COMPLETED
Start: 2020-01-01 | End: 2020-01-01

## 2020-01-01 RX ORDER — INSULIN LISPRO 100 [IU]/ML
0-12 INJECTION, SOLUTION INTRAVENOUS; SUBCUTANEOUS
Status: DISCONTINUED | OUTPATIENT
Start: 2020-01-01 | End: 2020-01-01

## 2020-01-01 RX ORDER — LATANOPROST 50 UG/ML
1 SOLUTION/ DROPS OPHTHALMIC NIGHTLY
Status: DISCONTINUED | OUTPATIENT
Start: 2020-01-01 | End: 2020-01-01 | Stop reason: HOSPADM

## 2020-01-01 RX ORDER — LIDOCAINE HYDROCHLORIDE 10 MG/ML
5 INJECTION, SOLUTION EPIDURAL; INFILTRATION; INTRACAUDAL; PERINEURAL ONCE
Status: COMPLETED | OUTPATIENT
Start: 2020-01-01 | End: 2020-01-01

## 2020-01-01 RX ORDER — ASPIRIN 81 MG/1
81 TABLET ORAL DAILY
Status: DISCONTINUED | OUTPATIENT
Start: 2020-01-01 | End: 2020-01-01 | Stop reason: HOSPADM

## 2020-01-01 RX ORDER — FUROSEMIDE 20 MG/1
20 TABLET ORAL SEE ADMIN INSTRUCTIONS
Qty: 90 TABLET | Refills: 3 | Status: ON HOLD | OUTPATIENT
Start: 2020-01-01 | End: 2020-01-01 | Stop reason: HOSPADM

## 2020-01-01 RX ORDER — INSULIN LISPRO 100 [IU]/ML
0-3 INJECTION, SOLUTION INTRAVENOUS; SUBCUTANEOUS NIGHTLY
Status: DISCONTINUED | OUTPATIENT
Start: 2020-01-01 | End: 2020-01-01

## 2020-01-01 RX ORDER — DEXTROSE MONOHYDRATE 25 G/50ML
12.5 INJECTION, SOLUTION INTRAVENOUS PRN
Status: DISCONTINUED | OUTPATIENT
Start: 2020-01-01 | End: 2020-01-01 | Stop reason: HOSPADM

## 2020-01-01 RX ORDER — INSULIN LISPRO 100 [IU]/ML
0-6 INJECTION, SOLUTION INTRAVENOUS; SUBCUTANEOUS
Status: DISCONTINUED | OUTPATIENT
Start: 2020-01-01 | End: 2020-01-01 | Stop reason: HOSPADM

## 2020-01-01 RX ORDER — ETOMIDATE 2 MG/ML
INJECTION INTRAVENOUS
Status: COMPLETED
Start: 2020-01-01 | End: 2020-01-01

## 2020-01-01 RX ORDER — AMLODIPINE BESYLATE 5 MG/1
5 TABLET ORAL DAILY
Status: DISCONTINUED | OUTPATIENT
Start: 2020-01-01 | End: 2020-01-01 | Stop reason: HOSPADM

## 2020-01-01 RX ORDER — SODIUM CHLORIDE 9 MG/ML
INJECTION, SOLUTION INTRAVENOUS CONTINUOUS
Status: DISCONTINUED | OUTPATIENT
Start: 2020-01-01 | End: 2020-01-01

## 2020-01-01 RX ORDER — OLANZAPINE 5 MG/1
5 TABLET ORAL ONCE
Status: COMPLETED | OUTPATIENT
Start: 2020-01-01 | End: 2020-01-01

## 2020-01-01 RX ORDER — CALCIUM GLUCONATE 20 MG/ML
2 INJECTION, SOLUTION INTRAVENOUS PRN
Status: DISCONTINUED | OUTPATIENT
Start: 2020-01-01 | End: 2020-01-01

## 2020-01-01 RX ORDER — 0.9 % SODIUM CHLORIDE 0.9 %
15 INTRAVENOUS SOLUTION INTRAVENOUS ONCE
Status: DISCONTINUED | OUTPATIENT
Start: 2020-01-01 | End: 2020-01-01

## 2020-01-01 RX ORDER — BRIMONIDINE TARTRATE, TIMOLOL MALEATE 2; 5 MG/ML; MG/ML
1 SOLUTION/ DROPS OPHTHALMIC DAILY
Status: DISCONTINUED | OUTPATIENT
Start: 2020-01-01 | End: 2020-01-01 | Stop reason: CLARIF

## 2020-01-01 RX ORDER — PEN NEEDLE, DIABETIC 31 GX5/16"
NEEDLE, DISPOSABLE MISCELLANEOUS
Qty: 100 EACH | Refills: 6 | Status: SHIPPED | OUTPATIENT
Start: 2020-01-01

## 2020-01-01 RX ORDER — MAGNESIUM SULFATE IN WATER 40 MG/ML
4 INJECTION, SOLUTION INTRAVENOUS ONCE
Status: COMPLETED | OUTPATIENT
Start: 2020-01-01 | End: 2020-01-01

## 2020-01-01 RX ORDER — INSULIN LISPRO 100 [IU]/ML
0.05 INJECTION, SOLUTION INTRAVENOUS; SUBCUTANEOUS
Status: DISCONTINUED | OUTPATIENT
Start: 2020-01-01 | End: 2020-01-01

## 2020-01-01 RX ORDER — ASPIRIN 81 MG/1
81 TABLET ORAL DAILY
Qty: 90 TABLET | Refills: 11 | Status: SHIPPED | OUTPATIENT
Start: 2020-01-01 | End: 2021-01-15

## 2020-01-01 RX ORDER — 0.9 % SODIUM CHLORIDE 0.9 %
30 INTRAVENOUS SOLUTION INTRAVENOUS ONCE
Status: COMPLETED | OUTPATIENT
Start: 2020-01-01 | End: 2020-01-01

## 2020-01-01 RX ORDER — DEXTROSE MONOHYDRATE 50 MG/ML
100 INJECTION, SOLUTION INTRAVENOUS PRN
Status: DISCONTINUED | OUTPATIENT
Start: 2020-01-01 | End: 2020-01-01 | Stop reason: HOSPADM

## 2020-01-01 RX ORDER — 0.9 % SODIUM CHLORIDE 0.9 %
500 INTRAVENOUS SOLUTION INTRAVENOUS ONCE
Status: COMPLETED | OUTPATIENT
Start: 2020-01-01 | End: 2020-01-01

## 2020-01-01 RX ORDER — DEXTROSE MONOHYDRATE 25 G/50ML
50 INJECTION, SOLUTION INTRAVENOUS ONCE
Status: DISCONTINUED | OUTPATIENT
Start: 2020-01-01 | End: 2020-01-01 | Stop reason: HOSPADM

## 2020-01-01 RX ORDER — ONDANSETRON 2 MG/ML
4 INJECTION INTRAMUSCULAR; INTRAVENOUS EVERY 6 HOURS PRN
Status: DISCONTINUED | OUTPATIENT
Start: 2020-01-01 | End: 2020-01-01 | Stop reason: HOSPADM

## 2020-01-01 RX ORDER — LISINOPRIL 40 MG/1
40 TABLET ORAL DAILY
Qty: 90 TABLET | Refills: 6 | Status: ON HOLD | OUTPATIENT
Start: 2020-01-01 | End: 2020-01-01 | Stop reason: HOSPADM

## 2020-01-01 RX ORDER — TIMOLOL MALEATE 5 MG/ML
1 SOLUTION/ DROPS OPHTHALMIC DAILY
Status: DISCONTINUED | OUTPATIENT
Start: 2020-01-01 | End: 2020-01-01

## 2020-01-01 RX ORDER — LATANOPROST 50 UG/ML
1 SOLUTION/ DROPS OPHTHALMIC NIGHTLY
Status: DISCONTINUED | OUTPATIENT
Start: 2020-01-01 | End: 2020-01-01

## 2020-01-01 RX ORDER — TIMOLOL MALEATE 5 MG/ML
1 SOLUTION/ DROPS OPHTHALMIC DAILY
Status: DISCONTINUED | OUTPATIENT
Start: 2020-01-01 | End: 2020-01-01 | Stop reason: HOSPADM

## 2020-01-01 RX ORDER — ERGOCALCIFEROL 1.25 MG/1
50000 CAPSULE ORAL WEEKLY
Qty: 12 CAPSULE | Refills: 1 | Status: SHIPPED | OUTPATIENT
Start: 2020-01-01

## 2020-01-01 RX ORDER — PROPOFOL 10 MG/ML
10 INJECTION, EMULSION INTRAVENOUS
Status: DISPENSED | OUTPATIENT
Start: 2020-01-01 | End: 2020-01-01

## 2020-01-01 RX ORDER — 0.9 % SODIUM CHLORIDE 0.9 %
1000 INTRAVENOUS SOLUTION INTRAVENOUS ONCE
Status: COMPLETED | OUTPATIENT
Start: 2020-01-01 | End: 2020-01-01

## 2020-01-01 RX ORDER — MIDAZOLAM HYDROCHLORIDE 1 MG/ML
2 INJECTION INTRAMUSCULAR; INTRAVENOUS
Status: DISCONTINUED | OUTPATIENT
Start: 2020-01-01 | End: 2020-01-01

## 2020-01-01 RX ORDER — HEPARIN SODIUM 1000 [USP'U]/ML
INJECTION, SOLUTION INTRAVENOUS; SUBCUTANEOUS
Status: DISPENSED
Start: 2020-01-01 | End: 2020-01-01

## 2020-01-01 RX ORDER — PROMETHAZINE HYDROCHLORIDE 25 MG/1
12.5 TABLET ORAL EVERY 6 HOURS PRN
Status: DISCONTINUED | OUTPATIENT
Start: 2020-01-01 | End: 2020-01-01 | Stop reason: HOSPADM

## 2020-01-01 RX ORDER — ATORVASTATIN CALCIUM 80 MG/1
80 TABLET, FILM COATED ORAL DAILY
Status: DISCONTINUED | OUTPATIENT
Start: 2020-01-01 | End: 2020-01-01 | Stop reason: HOSPADM

## 2020-01-01 RX ORDER — SUCCINYLCHOLINE/SOD CL,ISO/PF 200MG/10ML
SYRINGE (ML) INTRAVENOUS
Status: COMPLETED
Start: 2020-01-01 | End: 2020-01-01

## 2020-01-01 RX ORDER — FERROUS SULFATE 325(65) MG
325 TABLET ORAL
Status: DISCONTINUED | OUTPATIENT
Start: 2020-01-01 | End: 2020-01-01

## 2020-01-01 RX ORDER — DEXTROSE AND SODIUM CHLORIDE 5; .45 G/100ML; G/100ML
INJECTION, SOLUTION INTRAVENOUS CONTINUOUS PRN
Status: DISCONTINUED | OUTPATIENT
Start: 2020-01-01 | End: 2020-01-01

## 2020-01-01 RX ORDER — SODIUM CHLORIDE, SODIUM LACTATE, POTASSIUM CHLORIDE, CALCIUM CHLORIDE 600; 310; 30; 20 MG/100ML; MG/100ML; MG/100ML; MG/100ML
INJECTION, SOLUTION INTRAVENOUS CONTINUOUS
Status: DISCONTINUED | OUTPATIENT
Start: 2020-01-01 | End: 2020-01-01

## 2020-01-01 RX ORDER — ATORVASTATIN CALCIUM 80 MG/1
80 TABLET, FILM COATED ORAL NIGHTLY
Status: DISCONTINUED | OUTPATIENT
Start: 2020-01-01 | End: 2020-01-01

## 2020-01-01 RX ORDER — POLYETHYLENE GLYCOL 3350 17 G/17G
17 POWDER, FOR SOLUTION ORAL DAILY PRN
Status: DISCONTINUED | OUTPATIENT
Start: 2020-01-01 | End: 2020-01-01 | Stop reason: HOSPADM

## 2020-01-01 RX ORDER — INSULIN GLARGINE 100 [IU]/ML
INJECTION, SOLUTION SUBCUTANEOUS
Qty: 5 PEN | Refills: 4 | Status: ON HOLD | OUTPATIENT
Start: 2020-01-01 | End: 2020-01-01 | Stop reason: HOSPADM

## 2020-01-01 RX ORDER — PANTOPRAZOLE SODIUM 40 MG/10ML
40 INJECTION, POWDER, LYOPHILIZED, FOR SOLUTION INTRAVENOUS DAILY
Status: DISCONTINUED | OUTPATIENT
Start: 2020-01-01 | End: 2020-01-01 | Stop reason: HOSPADM

## 2020-01-01 RX ORDER — ACETAMINOPHEN 650 MG/1
650 SUPPOSITORY RECTAL EVERY 6 HOURS PRN
Status: DISCONTINUED | OUTPATIENT
Start: 2020-01-01 | End: 2020-01-01 | Stop reason: HOSPADM

## 2020-01-01 RX ORDER — INSULIN LISPRO 100 [IU]/ML
2 INJECTION, SOLUTION INTRAVENOUS; SUBCUTANEOUS
Status: DISCONTINUED | OUTPATIENT
Start: 2020-01-01 | End: 2020-01-01

## 2020-01-01 RX ORDER — NICOTINE POLACRILEX 4 MG
15 LOZENGE BUCCAL PRN
Status: DISCONTINUED | OUTPATIENT
Start: 2020-01-01 | End: 2020-01-01 | Stop reason: HOSPADM

## 2020-01-01 RX ORDER — MIDAZOLAM HYDROCHLORIDE 1 MG/ML
2 INJECTION INTRAMUSCULAR; INTRAVENOUS ONCE
Status: COMPLETED | OUTPATIENT
Start: 2020-01-01 | End: 2020-01-01

## 2020-01-01 RX ORDER — INSULIN LISPRO 100 [IU]/ML
0-6 INJECTION, SOLUTION INTRAVENOUS; SUBCUTANEOUS
Status: DISCONTINUED | OUTPATIENT
Start: 2020-01-01 | End: 2020-01-01

## 2020-01-01 RX ORDER — ACETAMINOPHEN 325 MG/1
650 TABLET ORAL EVERY 6 HOURS PRN
Status: DISCONTINUED | OUTPATIENT
Start: 2020-01-01 | End: 2020-01-01 | Stop reason: HOSPADM

## 2020-01-01 RX ORDER — 0.9 % SODIUM CHLORIDE 0.9 %
1000 INTRAVENOUS SOLUTION INTRAVENOUS
Status: ACTIVE | OUTPATIENT
Start: 2020-01-01 | End: 2020-01-01

## 2020-01-01 RX ORDER — ATORVASTATIN CALCIUM 80 MG/1
80 TABLET, FILM COATED ORAL DAILY
Qty: 90 TABLET | Refills: 6 | Status: SHIPPED | OUTPATIENT
Start: 2020-01-01

## 2020-01-01 RX ORDER — BRIMONIDINE TARTRATE 2 MG/ML
1 SOLUTION/ DROPS OPHTHALMIC DAILY
Status: DISCONTINUED | OUTPATIENT
Start: 2020-01-01 | End: 2020-01-01 | Stop reason: HOSPADM

## 2020-01-01 RX ORDER — MAGNESIUM SULFATE 1 G/100ML
1 INJECTION INTRAVENOUS PRN
Status: DISCONTINUED | OUTPATIENT
Start: 2020-01-01 | End: 2020-01-01

## 2020-01-01 RX ORDER — INSULIN LISPRO 100 [IU]/ML
0-3 INJECTION, SOLUTION INTRAVENOUS; SUBCUTANEOUS NIGHTLY
Status: DISCONTINUED | OUTPATIENT
Start: 2020-01-01 | End: 2020-01-01 | Stop reason: HOSPADM

## 2020-01-01 RX ORDER — MAGNESIUM SULFATE 1 G/100ML
1 INJECTION INTRAVENOUS ONCE
Status: COMPLETED | OUTPATIENT
Start: 2020-01-01 | End: 2020-01-01

## 2020-01-01 RX ORDER — CALCIUM GLUCONATE 20 MG/ML
1 INJECTION, SOLUTION INTRAVENOUS PRN
Status: DISCONTINUED | OUTPATIENT
Start: 2020-01-01 | End: 2020-01-01

## 2020-01-01 RX ORDER — INSULIN LISPRO 100 [IU]/ML
0-6 INJECTION, SOLUTION INTRAVENOUS; SUBCUTANEOUS NIGHTLY
Status: DISCONTINUED | OUTPATIENT
Start: 2020-01-01 | End: 2020-01-01

## 2020-01-01 RX ORDER — ASPIRIN 81 MG/1
81 TABLET ORAL DAILY
Status: DISCONTINUED | OUTPATIENT
Start: 2020-01-01 | End: 2020-01-01

## 2020-01-01 RX ORDER — CIPROFLOXACIN 2 MG/ML
400 INJECTION, SOLUTION INTRAVENOUS EVERY 12 HOURS
Status: DISCONTINUED | OUTPATIENT
Start: 2020-01-01 | End: 2020-01-01

## 2020-01-01 RX ORDER — POTASSIUM CHLORIDE 7.45 MG/ML
10 INJECTION INTRAVENOUS PRN
Status: DISCONTINUED | OUTPATIENT
Start: 2020-01-01 | End: 2020-01-01

## 2020-01-01 RX ORDER — AMLODIPINE BESYLATE 2.5 MG/1
2.5 TABLET ORAL DAILY
Qty: 90 TABLET | Refills: 5 | Status: SHIPPED | OUTPATIENT
Start: 2020-01-01

## 2020-01-01 RX ORDER — EPINEPHRINE 0.1 MG/ML
SYRINGE (ML) INJECTION
Status: COMPLETED | OUTPATIENT
Start: 2020-01-01 | End: 2020-01-01

## 2020-01-01 RX ORDER — FERROUS SULFATE 325(65) MG
325 TABLET ORAL
Qty: 90 TABLET | Refills: 3 | Status: SHIPPED | OUTPATIENT
Start: 2020-01-01

## 2020-01-01 RX ORDER — BRIMONIDINE TARTRATE 2 MG/ML
1 SOLUTION/ DROPS OPHTHALMIC DAILY
Status: DISCONTINUED | OUTPATIENT
Start: 2020-01-01 | End: 2020-01-01

## 2020-01-01 RX ORDER — FERROUS SULFATE 325(65) MG
325 TABLET ORAL
Status: DISCONTINUED | OUTPATIENT
Start: 2020-01-01 | End: 2020-01-01 | Stop reason: HOSPADM

## 2020-01-01 RX ORDER — DEXTROSE MONOHYDRATE 25 G/50ML
12.5 INJECTION, SOLUTION INTRAVENOUS PRN
Status: DISCONTINUED | OUTPATIENT
Start: 2020-01-01 | End: 2020-01-01

## 2020-01-01 RX ORDER — POTASSIUM CHLORIDE 29.8 MG/ML
20 INJECTION INTRAVENOUS PRN
Status: DISCONTINUED | OUTPATIENT
Start: 2020-01-01 | End: 2020-01-01

## 2020-01-01 RX ORDER — LIDOCAINE HYDROCHLORIDE 10 MG/ML
5 INJECTION, SOLUTION EPIDURAL; INFILTRATION; INTRACAUDAL; PERINEURAL ONCE
Status: DISCONTINUED | OUTPATIENT
Start: 2020-01-01 | End: 2020-01-01

## 2020-01-01 RX ORDER — AMLODIPINE BESYLATE 5 MG/1
2.5 TABLET ORAL DAILY
Status: DISCONTINUED | OUTPATIENT
Start: 2020-01-01 | End: 2020-01-01

## 2020-01-01 RX ORDER — NICOTINE POLACRILEX 4 MG
15 LOZENGE BUCCAL PRN
Status: DISCONTINUED | OUTPATIENT
Start: 2020-01-01 | End: 2020-01-01

## 2020-01-01 RX ORDER — CHLORHEXIDINE GLUCONATE 0.12 MG/ML
15 RINSE ORAL 2 TIMES DAILY
Status: DISCONTINUED | OUTPATIENT
Start: 2020-01-01 | End: 2020-01-01

## 2020-01-01 RX ORDER — GLYCERIN ADULT
500 SUPPOSITORY, RECTAL RECTAL EVERY 6 HOURS PRN
Qty: 100 TABLET | Refills: 3 | Status: SHIPPED | OUTPATIENT
Start: 2020-01-01

## 2020-01-01 RX ORDER — LISINOPRIL 10 MG/1
40 TABLET ORAL DAILY
Status: CANCELLED | OUTPATIENT
Start: 2020-01-01

## 2020-01-01 RX ORDER — METOPROLOL TARTRATE 50 MG/1
50 TABLET, FILM COATED ORAL 2 TIMES DAILY
Qty: 180 TABLET | Refills: 5 | Status: SHIPPED | OUTPATIENT
Start: 2020-01-01

## 2020-01-01 RX ORDER — MAGNESIUM SULFATE IN WATER 40 MG/ML
2 INJECTION, SOLUTION INTRAVENOUS ONCE
Status: COMPLETED | OUTPATIENT
Start: 2020-01-01 | End: 2020-01-01

## 2020-01-01 RX ORDER — EPINEPHRINE 1 MG/ML
INJECTION, SOLUTION, CONCENTRATE INTRAVENOUS
Status: COMPLETED | OUTPATIENT
Start: 2020-01-01 | End: 2020-01-01

## 2020-01-01 RX ORDER — METOPROLOL TARTRATE 50 MG/1
50 TABLET, FILM COATED ORAL 2 TIMES DAILY
Status: DISCONTINUED | OUTPATIENT
Start: 2020-01-01 | End: 2020-01-01 | Stop reason: HOSPADM

## 2020-01-01 RX ORDER — INSULIN LISPRO 100 [IU]/ML
0-12 INJECTION, SOLUTION INTRAVENOUS; SUBCUTANEOUS EVERY 4 HOURS
Status: DISCONTINUED | OUTPATIENT
Start: 2020-01-01 | End: 2020-01-01

## 2020-01-01 RX ADMIN — Medication 35 MCG/MIN: at 17:24

## 2020-01-01 RX ADMIN — TIMOLOL MALEATE 1 DROP: 5 SOLUTION/ DROPS OPHTHALMIC at 08:47

## 2020-01-01 RX ADMIN — MAGNESIUM SULFATE HEPTAHYDRATE 1 G: 1 INJECTION, SOLUTION INTRAVENOUS at 12:27

## 2020-01-01 RX ADMIN — FERROUS SULFATE TAB 325 MG (65 MG ELEMENTAL FE) 325 MG: 325 (65 FE) TAB at 09:16

## 2020-01-01 RX ADMIN — SODIUM CHLORIDE 1000 ML: 9 INJECTION, SOLUTION INTRAVENOUS at 19:07

## 2020-01-01 RX ADMIN — SODIUM CHLORIDE 500 ML: 9 INJECTION, SOLUTION INTRAVENOUS at 08:36

## 2020-01-01 RX ADMIN — Medication 35 MCG/MIN: at 02:11

## 2020-01-01 RX ADMIN — Medication 22.2 UNITS/HR: at 04:15

## 2020-01-01 RX ADMIN — PHENYLEPHRINE HYDROCHLORIDE 300 MCG/MIN: 10 INJECTION INTRAVENOUS at 03:38

## 2020-01-01 RX ADMIN — METOPROLOL TARTRATE 50 MG: 50 TABLET, FILM COATED ORAL at 21:47

## 2020-01-01 RX ADMIN — VASOPRESSIN 0.03 UNITS/MIN: 20 INJECTION INTRAVENOUS at 00:30

## 2020-01-01 RX ADMIN — EPINEPHRINE 100 MCG/MIN: 1 INJECTION INTRAMUSCULAR; INTRAVENOUS; SUBCUTANEOUS at 14:30

## 2020-01-01 RX ADMIN — TIMOLOL MALEATE 1 DROP: 5 SOLUTION/ DROPS OPHTHALMIC at 12:03

## 2020-01-01 RX ADMIN — METOPROLOL TARTRATE 50 MG: 50 TABLET, FILM COATED ORAL at 08:15

## 2020-01-01 RX ADMIN — SODIUM BICARBONATE: 84 INJECTION, SOLUTION INTRAVENOUS at 02:11

## 2020-01-01 RX ADMIN — SODIUM CHLORIDE 1000 ML: 9 INJECTION, SOLUTION INTRAVENOUS at 06:39

## 2020-01-01 RX ADMIN — NOREPINEPHRINE BITARTRATE 100 MCG/MIN: 1 INJECTION, SOLUTION, CONCENTRATE INTRAVENOUS at 15:30

## 2020-01-01 RX ADMIN — MEROPENEM 1 G: 1 INJECTION, POWDER, FOR SOLUTION INTRAVENOUS at 04:07

## 2020-01-01 RX ADMIN — ATORVASTATIN CALCIUM 80 MG: 80 TABLET, FILM COATED ORAL at 08:44

## 2020-01-01 RX ADMIN — Medication 36 UNITS/HR: at 12:20

## 2020-01-01 RX ADMIN — Medication 10 ML: at 21:29

## 2020-01-01 RX ADMIN — FERROUS SULFATE TAB 325 MG (65 MG ELEMENTAL FE) 325 MG: 325 (65 FE) TAB at 08:44

## 2020-01-01 RX ADMIN — Medication 10 ML: at 21:05

## 2020-01-01 RX ADMIN — METOPROLOL TARTRATE 50 MG: 50 TABLET, FILM COATED ORAL at 08:44

## 2020-01-01 RX ADMIN — LATANOPROST 1 DROP: 50 SOLUTION OPHTHALMIC at 21:29

## 2020-01-01 RX ADMIN — Medication 10 ML: at 21:49

## 2020-01-01 RX ADMIN — BRIMONIDINE TARTRATE 1 DROP: 2 SOLUTION OPHTHALMIC at 09:13

## 2020-01-01 RX ADMIN — MIDAZOLAM 2 MG: 1 INJECTION INTRAMUSCULAR; INTRAVENOUS at 17:03

## 2020-01-01 RX ADMIN — NOREPINEPHRINE BITARTRATE 90 MCG/MIN: 1 INJECTION, SOLUTION, CONCENTRATE INTRAVENOUS at 08:11

## 2020-01-01 RX ADMIN — EPINEPHRINE 1 MG: 1 INJECTION, SOLUTION INTRAMUSCULAR; SUBCUTANEOUS at 13:22

## 2020-01-01 RX ADMIN — Medication 10 ML: at 09:00

## 2020-01-01 RX ADMIN — Medication 10 ML: at 11:14

## 2020-01-01 RX ADMIN — INSULIN LISPRO 6 UNITS: 100 INJECTION, SOLUTION INTRAVENOUS; SUBCUTANEOUS at 06:23

## 2020-01-01 RX ADMIN — Medication 10 ML: at 08:13

## 2020-01-01 RX ADMIN — INSULIN LISPRO 2 UNITS: 100 INJECTION, SOLUTION INTRAVENOUS; SUBCUTANEOUS at 17:12

## 2020-01-01 RX ADMIN — Medication 3 MCG/MIN: at 10:26

## 2020-01-01 RX ADMIN — MEROPENEM 1 G: 1 INJECTION, POWDER, FOR SOLUTION INTRAVENOUS at 06:01

## 2020-01-01 RX ADMIN — PROPOFOL 30 MCG/KG/MIN: 10 INJECTION, EMULSION INTRAVENOUS at 16:13

## 2020-01-01 RX ADMIN — Medication 10 ML: at 10:25

## 2020-01-01 RX ADMIN — Medication 1300 ML/HR: at 10:28

## 2020-01-01 RX ADMIN — MAGNESIUM CHLORIDE, DEXTROSE MONOHYDRATE, LACTIC ACID, SODIUM CHLORIDE, SODIUM BICARBONATE AND POTASSIUM CHLORIDE 1000 ML/HR: 2.03; 22; 5.4; 6.46; 3.09; .157 INJECTION INTRAVENOUS at 03:10

## 2020-01-01 RX ADMIN — OLANZAPINE 5 MG: 5 TABLET, FILM COATED ORAL at 20:35

## 2020-01-01 RX ADMIN — Medication 1000 ML/HR: at 10:28

## 2020-01-01 RX ADMIN — Medication 1000 ML/HR: at 14:34

## 2020-01-01 RX ADMIN — NOREPINEPHRINE BITARTRATE 90 MCG/MIN: 1 INJECTION, SOLUTION, CONCENTRATE INTRAVENOUS at 07:07

## 2020-01-01 RX ADMIN — TIMOLOL MALEATE 1 DROP: 5 SOLUTION/ DROPS OPHTHALMIC at 09:16

## 2020-01-01 RX ADMIN — INSULIN LISPRO 6 UNITS: 100 INJECTION, SOLUTION INTRAVENOUS; SUBCUTANEOUS at 00:17

## 2020-01-01 RX ADMIN — ASPIRIN 81 MG: 81 TABLET, COATED ORAL at 08:15

## 2020-01-01 RX ADMIN — INSULIN LISPRO 2 UNITS: 100 INJECTION, SOLUTION INTRAVENOUS; SUBCUTANEOUS at 12:35

## 2020-01-01 RX ADMIN — EPINEPHRINE 30 MCG/MIN: 1 INJECTION INTRAMUSCULAR; INTRAVENOUS; SUBCUTANEOUS at 07:07

## 2020-01-01 RX ADMIN — ATORVASTATIN CALCIUM 80 MG: 80 TABLET, FILM COATED ORAL at 09:14

## 2020-01-01 RX ADMIN — Medication 44 UNITS/HR: at 16:03

## 2020-01-01 RX ADMIN — ASPIRIN 81 MG: 81 TABLET, COATED ORAL at 09:11

## 2020-01-01 RX ADMIN — BRIMONIDINE TARTRATE 1 DROP: 2 SOLUTION OPHTHALMIC at 08:47

## 2020-01-01 RX ADMIN — PANTOPRAZOLE SODIUM 40 MG: 40 INJECTION, POWDER, FOR SOLUTION INTRAVENOUS at 09:00

## 2020-01-01 RX ADMIN — METOPROLOL TARTRATE 50 MG: 50 TABLET, FILM COATED ORAL at 09:15

## 2020-01-01 RX ADMIN — SODIUM CHLORIDE 1000 ML: 9 INJECTION, SOLUTION INTRAVENOUS at 00:32

## 2020-01-01 RX ADMIN — Medication 1300 ML/HR: at 23:19

## 2020-01-01 RX ADMIN — INSULIN LISPRO 1 UNITS: 100 INJECTION, SOLUTION INTRAVENOUS; SUBCUTANEOUS at 08:52

## 2020-01-01 RX ADMIN — INSULIN GLARGINE 11 UNITS: 100 INJECTION, SOLUTION SUBCUTANEOUS at 02:30

## 2020-01-01 RX ADMIN — CEFEPIME HYDROCHLORIDE 2 G: 2 INJECTION, POWDER, FOR SOLUTION INTRAVENOUS at 02:13

## 2020-01-01 RX ADMIN — LATANOPROST 1 DROP: 50 SOLUTION OPHTHALMIC at 02:30

## 2020-01-01 RX ADMIN — Medication 10 ML: at 08:46

## 2020-01-01 RX ADMIN — PHENYLEPHRINE HYDROCHLORIDE 50 MCG/MIN: 10 INJECTION INTRAVENOUS at 20:12

## 2020-01-01 RX ADMIN — PHENYLEPHRINE HYDROCHLORIDE 300 MCG/MIN: 10 INJECTION INTRAVENOUS at 06:23

## 2020-01-01 RX ADMIN — BRIMONIDINE TARTRATE 1 DROP: 2 SOLUTION OPHTHALMIC at 08:36

## 2020-01-01 RX ADMIN — Medication 10 ML: at 09:12

## 2020-01-01 RX ADMIN — Medication 50 MCG/MIN: at 22:52

## 2020-01-01 RX ADMIN — CHLORHEXIDINE GLUCONATE 15 ML: 1.2 RINSE ORAL at 12:04

## 2020-01-01 RX ADMIN — Medication 10 ML: at 20:58

## 2020-01-01 RX ADMIN — INSULIN HUMAN 10 UNITS: 100 INJECTION, SOLUTION PARENTERAL at 09:03

## 2020-01-01 RX ADMIN — TIMOLOL MALEATE 1 DROP: 5 SOLUTION/ DROPS OPHTHALMIC at 08:36

## 2020-01-01 RX ADMIN — LATANOPROST 1 DROP: 50 SOLUTION OPHTHALMIC at 21:04

## 2020-01-01 RX ADMIN — INSULIN LISPRO 2 UNITS: 100 INJECTION, SOLUTION INTRAVENOUS; SUBCUTANEOUS at 17:22

## 2020-01-01 RX ADMIN — POTASSIUM PHOSPHATE, MONOBASIC AND POTASSIUM PHOSPHATE, DIBASIC 20 MMOL: 224; 236 INJECTION, SOLUTION, CONCENTRATE INTRAVENOUS at 16:43

## 2020-01-01 RX ADMIN — INSULIN LISPRO 2 UNITS: 100 INJECTION, SOLUTION INTRAVENOUS; SUBCUTANEOUS at 21:25

## 2020-01-01 RX ADMIN — VANCOMYCIN HYDROCHLORIDE 1500 MG: 10 INJECTION, POWDER, LYOPHILIZED, FOR SOLUTION INTRAVENOUS at 18:33

## 2020-01-01 RX ADMIN — INSULIN LISPRO 8 UNITS: 100 INJECTION, SOLUTION INTRAVENOUS; SUBCUTANEOUS at 10:55

## 2020-01-01 RX ADMIN — BRIMONIDINE TARTRATE 1 DROP: 2 SOLUTION OPHTHALMIC at 09:17

## 2020-01-01 RX ADMIN — Medication 1300 ML/HR: at 07:07

## 2020-01-01 RX ADMIN — BRIMONIDINE TARTRATE 1 DROP: 2 SOLUTION OPHTHALMIC at 12:03

## 2020-01-01 RX ADMIN — EPINEPHRINE 30 MCG/MIN: 1 INJECTION INTRAMUSCULAR; INTRAVENOUS; SUBCUTANEOUS at 10:27

## 2020-01-01 RX ADMIN — PHENYLEPHRINE HYDROCHLORIDE 275 MCG/MIN: 10 INJECTION INTRAVENOUS at 10:25

## 2020-01-01 RX ADMIN — PANTOPRAZOLE SODIUM 40 MG: 40 INJECTION, POWDER, FOR SOLUTION INTRAVENOUS at 12:04

## 2020-01-01 RX ADMIN — ACETAMINOPHEN 650 MG: 325 TABLET, FILM COATED ORAL at 04:26

## 2020-01-01 RX ADMIN — EPINEPHRINE 2 MCG/MIN: 1 INJECTION INTRAMUSCULAR; INTRAVENOUS; SUBCUTANEOUS at 02:16

## 2020-01-01 RX ADMIN — INSULIN LISPRO 4 UNITS: 100 INJECTION, SOLUTION INTRAVENOUS; SUBCUTANEOUS at 18:46

## 2020-01-01 RX ADMIN — INSULIN LISPRO 2 UNITS: 100 INJECTION, SOLUTION INTRAVENOUS; SUBCUTANEOUS at 12:28

## 2020-01-01 RX ADMIN — HYDROCORTISONE SODIUM SUCCINATE 50 MG: 100 INJECTION, POWDER, FOR SOLUTION INTRAMUSCULAR; INTRAVENOUS at 20:58

## 2020-01-01 RX ADMIN — MAGNESIUM CHLORIDE, DEXTROSE MONOHYDRATE, LACTIC ACID, SODIUM CHLORIDE, SODIUM BICARBONATE AND POTASSIUM CHLORIDE 1000 ML/HR: 2.03; 22; 5.4; 6.46; 3.09; .157 INJECTION INTRAVENOUS at 14:39

## 2020-01-01 RX ADMIN — SODIUM CHLORIDE, POTASSIUM CHLORIDE, SODIUM LACTATE AND CALCIUM CHLORIDE: 600; 310; 30; 20 INJECTION, SOLUTION INTRAVENOUS at 00:00

## 2020-01-01 RX ADMIN — MAGNESIUM CHLORIDE, DEXTROSE MONOHYDRATE, LACTIC ACID, SODIUM CHLORIDE, SODIUM BICARBONATE AND POTASSIUM CHLORIDE 500 ML/HR: 2.03; 22; 5.4; 6.46; 3.09; .157 INJECTION INTRAVENOUS at 14:28

## 2020-01-01 RX ADMIN — INSULIN HUMAN 20 UNITS: 100 INJECTION, SUSPENSION SUBCUTANEOUS at 18:40

## 2020-01-01 RX ADMIN — Medication 35 MCG/MIN: at 10:49

## 2020-01-01 RX ADMIN — SODIUM CHLORIDE 2250 ML: 9 INJECTION, SOLUTION INTRAVENOUS at 02:25

## 2020-01-01 RX ADMIN — PHENYLEPHRINE HYDROCHLORIDE 300 MCG/MIN: 10 INJECTION INTRAVENOUS at 16:04

## 2020-01-01 RX ADMIN — AMLODIPINE BESYLATE 2.5 MG: 5 TABLET ORAL at 08:15

## 2020-01-01 RX ADMIN — POTASSIUM BICARBONATE 20 MEQ: 782 TABLET, EFFERVESCENT ORAL at 16:37

## 2020-01-01 RX ADMIN — TIMOLOL MALEATE 1 DROP: 5 SOLUTION/ DROPS OPHTHALMIC at 09:13

## 2020-01-01 RX ADMIN — NOREPINEPHRINE BITARTRATE 100 MCG/MIN: 1 INJECTION, SOLUTION, CONCENTRATE INTRAVENOUS at 02:53

## 2020-01-01 RX ADMIN — ACETAMINOPHEN 650 MG: 325 TABLET, FILM COATED ORAL at 21:50

## 2020-01-01 RX ADMIN — BRIMONIDINE TARTRATE 1 DROP: 2 SOLUTION OPHTHALMIC at 09:02

## 2020-01-01 RX ADMIN — HYDROCORTISONE SODIUM SUCCINATE 50 MG: 100 INJECTION, POWDER, FOR SOLUTION INTRAMUSCULAR; INTRAVENOUS at 18:51

## 2020-01-01 RX ADMIN — CALCIUM GLUCONATE 3 G: 98 INJECTION, SOLUTION INTRAVENOUS at 04:39

## 2020-01-01 RX ADMIN — INSULIN LISPRO 1 UNITS: 100 INJECTION, SOLUTION INTRAVENOUS; SUBCUTANEOUS at 21:47

## 2020-01-01 RX ADMIN — EPINEPHRINE 1 MG: 0.1 INJECTION, SOLUTION ENDOTRACHEAL; INTRACARDIAC; INTRAVENOUS at 13:27

## 2020-01-01 RX ADMIN — AMLODIPINE BESYLATE 5 MG: 5 TABLET ORAL at 08:45

## 2020-01-01 RX ADMIN — HYDROCORTISONE SODIUM SUCCINATE 50 MG: 100 INJECTION, POWDER, FOR SOLUTION INTRAMUSCULAR; INTRAVENOUS at 06:01

## 2020-01-01 RX ADMIN — ATORVASTATIN CALCIUM 80 MG: 80 TABLET, FILM COATED ORAL at 09:12

## 2020-01-01 RX ADMIN — POTASSIUM PHOSPHATE, MONOBASIC AND POTASSIUM PHOSPHATE, DIBASIC 30 MMOL: 224; 236 INJECTION, SOLUTION, CONCENTRATE INTRAVENOUS at 06:51

## 2020-01-01 RX ADMIN — INSULIN LISPRO 6 UNITS: 100 INJECTION, SOLUTION INTRAVENOUS; SUBCUTANEOUS at 15:40

## 2020-01-01 RX ADMIN — Medication 1300 ML/HR: at 03:10

## 2020-01-01 RX ADMIN — LIDOCAINE HYDROCHLORIDE 5 ML: 10 INJECTION, SOLUTION EPIDURAL; INFILTRATION; INTRACAUDAL; PERINEURAL at 14:49

## 2020-01-01 RX ADMIN — CALCIUM CHLORIDE 8 G: 100 INJECTION INTRAVENOUS; INTRAVENTRICULAR at 23:19

## 2020-01-01 RX ADMIN — PROPOFOL 30 MCG/KG/MIN: 10 INJECTION, EMULSION INTRAVENOUS at 15:19

## 2020-01-01 RX ADMIN — HYDROCORTISONE SODIUM SUCCINATE 50 MG: 100 INJECTION, POWDER, FOR SOLUTION INTRAMUSCULAR; INTRAVENOUS at 05:32

## 2020-01-01 RX ADMIN — ETOMIDATE: 2 INJECTION, SOLUTION INTRAVENOUS at 13:45

## 2020-01-01 RX ADMIN — MIDAZOLAM 2 MG: 1 INJECTION INTRAMUSCULAR; INTRAVENOUS at 17:06

## 2020-01-01 RX ADMIN — METOPROLOL TARTRATE 50 MG: 50 TABLET, FILM COATED ORAL at 01:52

## 2020-01-01 RX ADMIN — LATANOPROST 1 DROP: 50 SOLUTION OPHTHALMIC at 20:44

## 2020-01-01 RX ADMIN — HYDROCORTISONE SODIUM SUCCINATE 50 MG: 100 INJECTION, POWDER, FOR SOLUTION INTRAMUSCULAR; INTRAVENOUS at 23:55

## 2020-01-01 RX ADMIN — MAGNESIUM SULFATE HEPTAHYDRATE 2 G: 40 INJECTION, SOLUTION INTRAVENOUS at 08:59

## 2020-01-01 RX ADMIN — CALCIUM GLUCONATE 3 G: 98 INJECTION, SOLUTION INTRAVENOUS at 22:08

## 2020-01-01 RX ADMIN — CHLORHEXIDINE GLUCONATE 15 ML: 1.2 RINSE ORAL at 21:04

## 2020-01-01 RX ADMIN — FERROUS SULFATE TAB 325 MG (65 MG ELEMENTAL FE) 325 MG: 325 (65 FE) TAB at 08:15

## 2020-01-01 RX ADMIN — ATORVASTATIN CALCIUM 80 MG: 80 TABLET, FILM COATED ORAL at 08:15

## 2020-01-01 RX ADMIN — Medication 10 ML: at 20:43

## 2020-01-01 RX ADMIN — HYDROCORTISONE SODIUM SUCCINATE 50 MG: 100 INJECTION, POWDER, FOR SOLUTION INTRAMUSCULAR; INTRAVENOUS at 11:59

## 2020-01-01 RX ADMIN — LATANOPROST 1 DROP: 50 SOLUTION OPHTHALMIC at 21:47

## 2020-01-01 RX ADMIN — BRIMONIDINE TARTRATE 1 DROP: 2 SOLUTION OPHTHALMIC at 13:04

## 2020-01-01 RX ADMIN — SODIUM BICARBONATE: 84 INJECTION, SOLUTION INTRAVENOUS at 10:27

## 2020-01-01 RX ADMIN — Medication 10 ML: at 12:05

## 2020-01-01 RX ADMIN — CHLORHEXIDINE GLUCONATE 15 ML: 1.2 RINSE ORAL at 20:43

## 2020-01-01 RX ADMIN — AMLODIPINE BESYLATE 5 MG: 5 TABLET ORAL at 09:14

## 2020-01-01 RX ADMIN — INSULIN LISPRO 4 UNITS: 100 INJECTION, SOLUTION INTRAVENOUS; SUBCUTANEOUS at 17:21

## 2020-01-01 RX ADMIN — Medication 10 ML: at 10:29

## 2020-01-01 RX ADMIN — MAGNESIUM CHLORIDE, DEXTROSE MONOHYDRATE, LACTIC ACID, SODIUM CHLORIDE, SODIUM BICARBONATE AND POTASSIUM CHLORIDE 1000 ML/HR: 2.03; 22; 5.4; 6.46; 3.09; .157 INJECTION INTRAVENOUS at 22:48

## 2020-01-01 RX ADMIN — INSULIN GLARGINE 11 UNITS: 100 INJECTION, SOLUTION SUBCUTANEOUS at 20:56

## 2020-01-01 RX ADMIN — AMLODIPINE BESYLATE 5 MG: 5 TABLET ORAL at 09:11

## 2020-01-01 RX ADMIN — Medication: at 16:15

## 2020-01-01 RX ADMIN — FERROUS SULFATE TAB 325 MG (65 MG ELEMENTAL FE) 325 MG: 325 (65 FE) TAB at 08:36

## 2020-01-01 RX ADMIN — HYDROCORTISONE SODIUM SUCCINATE 50 MG: 100 INJECTION, POWDER, FOR SOLUTION INTRAMUSCULAR; INTRAVENOUS at 10:49

## 2020-01-01 RX ADMIN — TIMOLOL MALEATE 1 DROP: 5 SOLUTION/ DROPS OPHTHALMIC at 13:04

## 2020-01-01 RX ADMIN — CALCIUM GLUCONATE 1 G: 98 INJECTION, SOLUTION INTRAVENOUS at 10:30

## 2020-01-01 RX ADMIN — INSULIN LISPRO 4 UNITS: 100 INJECTION, SOLUTION INTRAVENOUS; SUBCUTANEOUS at 17:11

## 2020-01-01 RX ADMIN — SODIUM CHLORIDE, POTASSIUM CHLORIDE, SODIUM LACTATE AND CALCIUM CHLORIDE: 600; 310; 30; 20 INJECTION, SOLUTION INTRAVENOUS at 11:12

## 2020-01-01 RX ADMIN — ENOXAPARIN SODIUM 30 MG: 30 INJECTION SUBCUTANEOUS at 08:14

## 2020-01-01 RX ADMIN — HYDROCORTISONE SODIUM SUCCINATE 50 MG: 100 INJECTION, POWDER, FOR SOLUTION INTRAMUSCULAR; INTRAVENOUS at 12:24

## 2020-01-01 RX ADMIN — SODIUM BICARBONATE: 84 INJECTION, SOLUTION INTRAVENOUS at 04:49

## 2020-01-01 RX ADMIN — MEROPENEM 1 G: 1 INJECTION, POWDER, FOR SOLUTION INTRAVENOUS at 16:42

## 2020-01-01 RX ADMIN — Medication 35 MCG/MIN: at 18:22

## 2020-01-01 RX ADMIN — METOPROLOL TARTRATE 50 MG: 50 TABLET, FILM COATED ORAL at 21:29

## 2020-01-01 RX ADMIN — SODIUM BICARBONATE: 84 INJECTION, SOLUTION INTRAVENOUS at 23:19

## 2020-01-01 RX ADMIN — VASOPRESSIN 0.03 UNITS/MIN: 20 INJECTION INTRAVENOUS at 18:32

## 2020-01-01 RX ADMIN — FENTANYL CITRATE 0.5 MCG/KG/HR: 50 INJECTION, SOLUTION INTRAMUSCULAR; INTRAVENOUS at 16:35

## 2020-01-01 RX ADMIN — TIMOLOL MALEATE 1 DROP: 5 SOLUTION/ DROPS OPHTHALMIC at 09:02

## 2020-01-01 RX ADMIN — PHENYLEPHRINE HYDROCHLORIDE 300 MCG/MIN: 10 INJECTION INTRAVENOUS at 00:30

## 2020-01-01 RX ADMIN — Medication 10 ML: at 21:47

## 2020-01-01 RX ADMIN — LATANOPROST 1 DROP: 50 SOLUTION OPHTHALMIC at 20:57

## 2020-01-01 RX ADMIN — ASPIRIN 81 MG: 81 TABLET, COATED ORAL at 08:44

## 2020-01-01 RX ADMIN — FENTANYL CITRATE 0.5 MCG/KG/HR: 50 INJECTION, SOLUTION INTRAMUSCULAR; INTRAVENOUS at 17:07

## 2020-01-01 RX ADMIN — MIDAZOLAM 6 MG/HR: 5 INJECTION INTRAMUSCULAR; INTRAVENOUS at 17:06

## 2020-01-01 RX ADMIN — FERROUS SULFATE TAB 325 MG (65 MG ELEMENTAL FE) 325 MG: 325 (65 FE) TAB at 09:11

## 2020-01-01 RX ADMIN — ACETAMINOPHEN 650 MG: 325 TABLET, FILM COATED ORAL at 17:24

## 2020-01-01 RX ADMIN — SODIUM CHLORIDE: 9 INJECTION, SOLUTION INTRAVENOUS at 02:32

## 2020-01-01 RX ADMIN — PHENYLEPHRINE HYDROCHLORIDE 300 MCG/MIN: 10 INJECTION INTRAVENOUS at 13:06

## 2020-01-01 RX ADMIN — METOPROLOL TARTRATE 50 MG: 50 TABLET, FILM COATED ORAL at 09:11

## 2020-01-01 RX ADMIN — Medication 4.05 UNITS/HR: at 21:36

## 2020-01-01 RX ADMIN — MAGNESIUM SULFATE HEPTAHYDRATE 4 G: 40 INJECTION, SOLUTION INTRAVENOUS at 11:52

## 2020-01-01 RX ADMIN — CIPROFLOXACIN 400 MG: 2 INJECTION, SOLUTION INTRAVENOUS at 11:58

## 2020-01-01 RX ADMIN — SODIUM CHLORIDE: 9 INJECTION, SOLUTION INTRAVENOUS at 04:34

## 2020-01-01 RX ADMIN — METOPROLOL TARTRATE 50 MG: 50 TABLET, FILM COATED ORAL at 20:56

## 2020-01-01 RX ADMIN — ASPIRIN 81 MG: 81 TABLET, COATED ORAL at 09:14

## 2020-01-01 RX ADMIN — CHLORHEXIDINE GLUCONATE 15 ML: 1.2 RINSE ORAL at 09:02

## 2020-01-01 RX ADMIN — MEROPENEM 1 G: 1 INJECTION, POWDER, FOR SOLUTION INTRAVENOUS at 17:00

## 2020-01-01 ASSESSMENT — PATIENT HEALTH QUESTIONNAIRE - PHQ9
2. FEELING DOWN, DEPRESSED OR HOPELESS: 0
SUM OF ALL RESPONSES TO PHQ QUESTIONS 1-9: 0
SUM OF ALL RESPONSES TO PHQ9 QUESTIONS 1 & 2: 0
1. LITTLE INTEREST OR PLEASURE IN DOING THINGS: 0
SUM OF ALL RESPONSES TO PHQ QUESTIONS 1-9: 0

## 2020-01-01 ASSESSMENT — ENCOUNTER SYMPTOMS
COUGH: 0
WHEEZING: 0
SHORTNESS OF BREATH: 0
EYES NEGATIVE: 1
NAUSEA: 0
ABDOMINAL PAIN: 0
CHEST TIGHTNESS: 0
VOMITING: 0

## 2020-01-01 ASSESSMENT — PAIN SCALES - GENERAL
PAINLEVEL_OUTOF10: 0
PAINLEVEL_OUTOF10: 7
PAINLEVEL_OUTOF10: 0
PAINLEVEL_OUTOF10: 6
PAINLEVEL_OUTOF10: 0
PAINLEVEL_OUTOF10: 8
PAINLEVEL_OUTOF10: 0
PAINLEVEL_OUTOF10: 8
PAINLEVEL_OUTOF10: 0

## 2020-01-01 ASSESSMENT — PULMONARY FUNCTION TESTS
PIF_VALUE: 26
PIF_VALUE: 24
PIF_VALUE: 27
PIF_VALUE: 26
PIF_VALUE: 29
PIF_VALUE: 33
PIF_VALUE: 29
PIF_VALUE: 32
PIF_VALUE: 28
PIF_VALUE: 29
PIF_VALUE: 25
PIF_VALUE: 29
PIF_VALUE: 23
PIF_VALUE: 22

## 2020-01-01 ASSESSMENT — PAIN DESCRIPTION - LOCATION: LOCATION: BACK

## 2020-01-01 ASSESSMENT — PAIN DESCRIPTION - PAIN TYPE: TYPE: CHRONIC PAIN

## 2020-01-01 ASSESSMENT — PAIN DESCRIPTION - ONSET: ONSET: OTHER (COMMENT)

## 2020-01-16 NOTE — PROGRESS NOTES
Subjective:      Patient ID: Eloisa Farah is a [de-identified] y.o. male. 1/16/2020 Patient presents with:  Diabetes: 4 month follow up    No falls . But gets unsteady on feet at times . Lives with son who does not talk much     Hangs out at [facility] sometimes . lonely          Last seen  9/16/19     Diabetes   He presents for his follow-up diabetic visit. He has type 2 diabetes mellitus. His disease course has been fluctuating. Pertinent negatives for hypoglycemia include no headaches or tremors. Pertinent negatives for diabetes include no chest pain, no fatigue and no weakness. Eye exam is current. Hypertension   This is a chronic problem. The problem has been waxing and waning since onset. Associated symptoms include anxiety. Pertinent negatives include no chest pain, headaches or palpitations. Review of Systems   Constitutional: Negative for chills, fatigue and fever. Unexpected weight change: voluntary. Pneumovac 1/12; Prevnar 1/16     Flu vac 9/19    Shingrex  / 6/17 at [facility]    Tdap 4/17   HENT:        Upper Dentures   Eyes: Negative. Eye exam  3/19   Respiratory: Negative for cough, chest tightness and wheezing. Does not smoke ; No Etoh    Cardiovascular: Negative. Negative for chest pain and palpitations. Known CAD ; S/P CABG   Gastrointestinal:        Colonoscopy 12/2010 . Polyps . C/o Dr Ben Kaplan   Endocrine:         Diabetic    Genitourinary: Negative for dysuria. Musculoskeletal: Positive for arthralgias (off and on ). Sees  Podiatrist  Reg    Skin: Negative for rash. Allergic/Immunologic: Negative for food allergies. Neurological: Negative for tremors, weakness and headaches. Psychiatric/Behavioral: Negative for sleep disturbance. Objective:   Physical Exam   Constitutional: He is oriented to person, place, and time. No distress. Weight stable this visit         Eyes: Conjunctivae are normal.   Neck: Neck supple.    Cardiovascular: Normal rate, regular rhythm and normal heart sounds. Pulmonary/Chest: Breath sounds normal.   Abdominal: He exhibits distension. Musculoskeletal: Normal range of motion. General: Deformity present. No edema. Comments: Sees podiatrist reg     Neurological: He is alert and oriented to person, place, and time. He has normal strength. Skin: Skin is warm and dry. Psychiatric: He has a normal mood and affect. His speech is normal and behavior is normal. Judgment normal. Cognition and memory are normal.   Vitals reviewed. Assessment:       Kevin Diego was seen today for diabetes. Diagnoses and all orders for this visit: reviewed labs from last visit     Type 2 diabetes mellitus without complication, with long-term current use of insulin ([facility])A1C 8.3   Stable   -     POCT glycosylated hemoglobin (Hb A1C)  -     lisinopril (PRINIVIL;ZESTRIL) 40 MG tablet; Take 1 tablet by mouth daily  -     insulin glargine (LANTUS SOLOSTAR) 100 UNIT/ML injection pen; Inject 80 Units into the skin every morning (before breakfast)  -     SITagliptin (JANUVIA) 50 MG tablet; Take 1 tablet by mouth daily  -     aspirin EC 81 MG EC tablet; Take 1 tablet by mouth daily  -     atorvastatin (LIPITOR) 80 MG tablet; Take 1 tablet by mouth daily    Essential hypertension controlled   -     lisinopril (PRINIVIL;ZESTRIL) 40 MG tablet; Take 1 tablet by mouth daily  -     metoprolol tartrate (LOPRESSOR) 50 MG tablet; Take 1 tablet by mouth 2 times daily  -     amLODIPine (NORVASC) 2.5 MG tablet; Take 1 tablet by mouth daily  -     furosemide (LASIX) 20 MG tablet; Take 1 tablet by mouth See Admin Instructions    Vitamin D deficiency  -     vitamin D (ERGOCALCIFEROL) 1.25 MG (05956 UT) CAPS capsule; Take 1 capsule by mouth once a week    Renal insufficiency stable .  Does not see Nephrology any more b/e distance     Coronary artery disease involving native coronary artery of native heart without angina pectoris  -     metoprolol tartrate (LOPRESSOR) 50 MG tablet; Take 1 tablet by mouth 2 times daily  -     aspirin EC 81 MG EC tablet; Take 1 tablet by mouth daily  -     atorvastatin (LIPITOR) 80 MG tablet; Take 1 tablet by mouth daily    Mixed hyperlipidemia  Controlled   -     atorvastatin (LIPITOR) 80 MG tablet; Take 1 tablet by mouth daily    Other iron deficiency anemia  -     ferrous sulfate 325 (65 Fe) MG tablet; Take 1 tablet by mouth daily (with breakfast)    Lonely  Agrees to think of getting help from [facility] if needed but declines at this time         Generalized OA    No Nsaids / RI . T ES tylenol qid prn  -            Plan:      Self Management Goals    Know which medication is for what condition:   Know side effects of medications, and discuss with doctor   Discuss side effects and instructions on new medications  Know correct dose/frequency of medications  Take medications at the same time each day  Stay current on medication refills  If taking OTC's check with MD/pharmacy first about interactions  Monitor sugars and record time/meal  A1C goal 7.0 or less  LDL goal 912 or less  Systolic BP < or equal to 799  Diastolic BP < or equal to 85    Current Flu and Pneumonia Vax    Exercise 3-5 times per week as tolerated   Keep check of weight  Weighting machine    On a scale of 1 to 5 how confident are you in these goals?   4/5       Education materials given   Diabetic Education Referral   [phone]

## 2020-08-03 PROBLEM — R44.3 HALLUCINATION: Status: ACTIVE | Noted: 2020-01-01

## 2020-08-03 PROBLEM — N17.9 ACUTE KIDNEY INJURY (HCC): Status: ACTIVE | Noted: 2020-01-01

## 2020-08-03 NOTE — ED PROVIDER NOTES
disagreements were addressed in the HPI. REVIEW OF SYSTEMS    (2-9 systems for level 4, 10 or more for level 5)     Review of Systems   Constitutional: Positive for activity change. Negative for fatigue and fever. HENT: Negative. Eyes: Negative for visual disturbance. Respiratory: Negative for shortness of breath. Cardiovascular: Negative for chest pain. Gastrointestinal: Negative for abdominal pain, nausea and vomiting. Genitourinary: Negative. Musculoskeletal: Negative. Skin: Negative. Neurological: Negative. Psychiatric/Behavioral: Positive for hallucinations. Positives and Pertinent negatives as per HPI. Except as noted above in the ROS, all other systems were reviewed and negative. 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    Type II or unspecified type diabetes mellitus without mention of complication, not stated as uncontrolled     Unspecified sleep apnea          SURGICAL HISTORY     Past Surgical History:   Procedure Laterality Date    COLONOSCOPY      EYE SURGERY      VASECTOMY  1974         CURRENTMEDICATIONS       Previous Medications    AMLODIPINE (NORVASC) 2.5 MG TABLET    Take 1 tablet by mouth daily    ASPIRIN EC 81 MG EC TABLET    Take 1 tablet by mouth daily    ATORVASTATIN (LIPITOR) 80 MG TABLET    Take 1 tablet by mouth daily    B-D ULTRAFINE III SHORT PEN 31G X 8 MM MISC    USE TWICE DAILY AS DIRECTED    BRIMONIDINE-TIMOLOL (COMBIGAN) 0.2-0.5 % OPHTHALMIC SOLUTION    Place 1 drop into both eyes daily.       [facility] PAIN RELIEF EXTRA STRENGTH 500 MG TABLET    TAKE 1 TABLET BY MOUTH EVERY 6 HOURS AS NEEDED FOR PAIN    FERROUS SULFATE 325 (65 FE) MG TABLET    Take 1 tablet by mouth daily (with breakfast)    FUROSEMIDE (LASIX) 20 MG TABLET    Take 1 tablet by mouth See Admin Instructions    LANTUS SOLOSTAR 100 UNIT/ML INJECTION PEN    INJECT 80 UNITS INTO THE SKIN EVERY MORNING (BEFORE BREAKFAST)    LATANOPROST (XALATAN) 0.005 % OPHTHALMIC SOLUTION    Place 1 drop into the left eye nightly. LISINOPRIL (PRINIVIL;ZESTRIL) 40 MG TABLET    Take 1 tablet by mouth daily    METOPROLOL TARTRATE (LOPRESSOR) 50 MG TABLET    Take 1 tablet by mouth 2 times daily    SITAGLIPTIN (JANUVIA) 50 MG TABLET    Take 1 tablet by mouth daily    VITAMIN D (ERGOCALCIFEROL) 1.25 MG (40652 UT) CAPS CAPSULE    Take 1 capsule by mouth once a week         ALLERGIES     Pcn [penicillins]    FAMILYHISTORY       Family History   Problem Relation Age of Onset    Diabetes Mother     Cancer Mother     Emphysema Father     Heart Disease Brother           SOCIAL HISTORY       Social History     Tobacco Use    Smoking status: Former Smoker     Packs/day: 2.00     Years: 25.00     Pack years: 50.00     Types: Cigarettes     Last attempt to quit: 1981     Years since quittin.6    Smokeless tobacco: Former User   Substance Use Topics    Alcohol use: No    Drug use: No       SCREENINGS   NIH Stroke Scale  NIH Stroke Scale Assessed: Yes  Interval: Baseline  Level of Consciousness (1a. ): Alert  LOC Questions (1b. ):  Answers both correctly  LOC Commands (1c. ): Performs both tasks correctly  Best Gaze (2. ): Normal  Visual (3. ): No visual loss  Facial Palsy (4. ): Normal symmetrical movement  Motor Arm, Left (5a. ): No drift  Motor Arm, Right (5b. ): No drift  Motor Leg, Left (6a. ): No drift  Motor Leg, Right (6b. ): No drift  Limb Ataxia (7. ): Absent  Sensory (8. ): Normal  Best Language (9. ): No aphasia  Dysarthria (10. ): Normal  Extinction and Inattention (11): No abnormality  Total: 0         PHYSICAL EXAM    (up to 7 for level 4, 8 or more for level 5)     ED Triage Vitals [20 1634]   BP Temp Temp Source Pulse Resp SpO2 Height Weight   (!) 152/79 98.2 °F (36.8 °C) Oral 63 18 100 % 5' 6\" (1.676 m) 160 lb (72.6 kg)       Physical Exam  Vitals signs and nursing note reviewed. Constitutional:       General: He is not in acute distress. Appearance: Normal appearance. He is well-developed. He is not ill-appearing, toxic-appearing or diaphoretic. Comments: Patient is extremely hearing impaired. HENT:      Head: Normocephalic and atraumatic. Nose: Nose normal.      Mouth/Throat:      Mouth: Mucous membranes are moist.      Pharynx: Oropharynx is clear. Eyes:      General:         Right eye: No discharge. Left eye: No discharge. Conjunctiva/sclera: Conjunctivae normal.      Pupils: Pupils are equal, round, and reactive to light. Neck:      Musculoskeletal: Normal range of motion and neck supple. No neck rigidity or muscular tenderness. Cardiovascular:      Rate and Rhythm: Normal rate and regular rhythm. Heart sounds: Normal heart sounds. No murmur. No gallop. Pulmonary:      Effort: Pulmonary effort is normal. No respiratory distress. Breath sounds: Normal breath sounds. No wheezing, rhonchi or rales. Abdominal:      General: Bowel sounds are normal.      Tenderness: There is no abdominal tenderness. There is no guarding or rebound. Musculoskeletal: Normal range of motion. General: No swelling or tenderness. Right lower leg: No edema. Left lower leg: No edema. Skin:     General: Skin is warm and dry. Capillary Refill: Capillary refill takes less than 2 seconds. Coloration: Skin is not ashen, jaundiced or pale. Comments: Slightly mohsen appearance to bilateral cheeks and around the eyes. Neurological:      General: No focal deficit present. Mental Status: He is alert and oriented to person, place, and time. Psychiatric:         Attention and Perception: He perceives auditory and visual hallucinations.          Mood and Affect: Mood normal.         Behavior: Behavior normal.      Comments: Patient states he is having visual hallucinations but not at the current time. Patient states he also is having auditory hallucinations. He states it is a male and female voices intermittently that are chanting to him that he does not do things correctly.          DIAGNOSTIC RESULTS   LABS:    Labs Reviewed   CBC WITH AUTO DIFFERENTIAL - Abnormal; Notable for the following components:       Result Value    WBC 12.1 (*)     Neutrophils Absolute 8.6 (*)     All other components within normal limits    Narrative:     Performed at:  [facility]  [place]   Phone [phone]   COMPREHENSIVE METABOLIC PANEL - Abnormal; Notable for the following components:    Sodium 133 (*)     Chloride 97 (*)     Glucose 282 (*)     BUN 29 (*)     CREATININE 2.4 (*)     GFR Non- 26 (*)     GFR  32 (*)     All other components within normal limits    Narrative:     Performed at:  [facility] [address]   Phone [phone]   LIPASE - Abnormal; Notable for the following components:    Lipase 69.0 (*)     All other components within normal limits    Narrative:     Performed at:  [facility] [address], [place]   Phone [phone]   URINE RT REFLEX TO CULTURE - Abnormal; Notable for the following components:    Glucose, Ur >=1000 (*)     Bilirubin Urine SMALL (*)     Ketones, Urine TRACE (*)     Protein,  (*)     All other components within normal limits    Narrative:     Performed at:  [facility]  [place]   Phone [phone]   ACETAMINOPHEN LEVEL - Abnormal; Notable for the following components:    Acetaminophen Level <5 (*)     All other components within normal limits    Narrative:     Performed at:  [facility] [address], [place], [facility] [address]   Phone [phone]   SALICYLATE LEVEL - Abnormal; Notable for the following components:    Salicylate, Serum 0.4 (*)     All other components within normal limits    Narrative:     Performed at:  [facility]  [address], [place]   Phone [phone]   MICROSCOPIC URINALYSIS - Abnormal; Notable for the following components:    Hyaline Casts, UA 10 (*)     All other components within normal limits    Narrative:     Performed at:  [facility]  [address], [place]   Phone [phone]   POCT GLUCOSE - Abnormal; Notable for the following components:    POC Glucose 272 (*)     All other components within normal limits    Narrative:     Performed at:  [facility] [address], [place]   Phone [phone]   CULTURE, BLOOD 1   CULTURE, BLOOD 2   AMMONIA    Narrative:     Performed at:  [facility] [address], [place]   Phone [phone]   URINE DRUG SCREEN    Narrative:     Performed at:  [facility] [address], [place]   Phone [phone]   ETHANOL    Narrative:     Performed at:  [facility]  [address], [place]   Phone [phone]   TROPONIN    Narrative:     Performed at:  [facility] [address], [place]   Phone [phone]   LACTIC ACID, PLASMA    Narrative:     Performed at:  [facility] [address], [place]   Phone [phone]       All other labs were within normal range or not returned as of this dictation. EKG: All EKG's are interpreted by the Emergency Department Physician in the absence of a cardiologist.  Please see their note for interpretation of EKG.       RADIOLOGY:   Non-plain film images such as CT, Ultrasound and MRI are read by the radiologist. Plain radiographic images are visualized and preliminarily interpreted by the ED Provider with the below findings:        Interpretation per the Radiologist below, if available at the time of this note:    XR CHEST PORTABLE   Final Result   No acute cardiopulmonary process. CT HEAD WO CONTRAST   Final Result   1. No acute intracranial abnormality. 2. Findings compatible with remote lacunar infarcts involving the left basal   ganglia and right caudate head. Ct Head Wo Contrast    Result Date: 8/3/2020  EXAMINATION: CT OF THE HEAD WITHOUT CONTRAST  8/3/2020 4:59 pm TECHNIQUE: CT of the head was performed without the administration of intravenous contrast. Dose modulation, iterative reconstruction, and/or weight based adjustment of the mA/kV was utilized to reduce the radiation dose to as low as reasonably achievable. COMPARISON: None. HISTORY: ORDERING SYSTEM PROVIDED HISTORY: ams TECHNOLOGIST PROVIDED HISTORY: Reason for exam:->ams Has a \"code stroke\" or \"stroke alert\" been called? ->No Reason for Exam: AMS. Acuity: Acute Type of Exam: Initial FINDINGS: BRAIN/VENTRICLES: There is no acute intracranial hemorrhage, mass effect, or midline shift. No abnormal extra-axial fluid collection. The gray-white differentiation is maintained without evidence of an acute infarct. Focal hypodensity involving the left basal ganglia and right caudate head, likely correlating with remote lacunar infarct. There is diffuse parenchymal volume loss, likely age related. Small focus of CSF density anterior to the left temporal horn likely representing a small arachnoid cyst. ORBITS: The visualized portion of the orbits demonstrate no acute abnormality. SINUSES: The visualized paranasal sinuses and mastoid air cells demonstrate no acute abnormality. SOFT TISSUES/SKULL:  No acute abnormality of the visualized skull or soft tissues.      1. No acute intracranial abnormality. 2. Findings compatible with remote lacunar infarcts involving the left basal ganglia and right caudate head. Xr Chest Portable    Result Date: 8/3/2020  EXAMINATION: ONE XRAY VIEW OF THE CHEST 8/3/2020 5:18 pm COMPARISON: Chest radiograph performed 05/11/2012. HISTORY: ORDERING SYSTEM PROVIDED HISTORY: SOB TECHNOLOGIST PROVIDED HISTORY: Reason for exam:->SOB Reason for Exam: ALTERED MENTAL STATUS. FORMER SMOKER. HISTORY OF DIABETES, CABG, HTN AND A-FIB. Acuity: Acute Type of Exam: Initial FINDINGS: There is no acute consolidation or effusion. There is a stable left lung granuloma. There is no pneumothorax. The mediastinal structures are unremarkable. The upper abdomen is unremarkable. The extrathoracic soft tissues are unremarkable. There is no acute osseous abnormality. No acute cardiopulmonary process. PROCEDURES   Unless otherwise noted below, none     Procedures    CRITICAL CARE TIME   N/A    CONSULTS:  IP CONSULT TO HOSPITALIST      EMERGENCY DEPARTMENT COURSE and DIFFERENTIAL DIAGNOSIS/MDM:   Vitals:    Vitals:    08/03/20 1930 08/03/20 2000 08/03/20 2043 08/03/20 2059   BP: (!) 187/76 (!) 171/92     Pulse: 65 67 63 62   Resp: 22 20     Temp:       TempSrc:       SpO2: 98% 99% 96% 97%   Weight:       Height:           Patient was given the following medications:  Medications   0.9 % sodium chloride bolus (0 mLs Intravenous Stopped 8/3/20 2034)   OLANZapine (ZYPREXA) tablet 5 mg (5 mg Oral Given 8/3/20 2035)         Patient presents emergency department for evaluation of altered mental status and hallucinations. Patient is alert and oriented no acute distress. Vitals are stable and he is afebrile. Despite patient being alert and oriented he states he is seeing visual hallucinations as well as auditory hallucinations. Patient states he is seeing people coming and going through the ceiling. He states people are running around in his room.   He states actively he is not seeing these things however it does happen frequently. Patient states he is actively having auditory hallucinations of both a male and female voices chanting to him that he does not do things correctly. Patient states he has had the auditory hallucinations for around a month but just started with the visual hallucinations. Patient states he feels generally well. Patient's heart rate is regular without murmurs rubs or gallops. Lungs are clear to auscultation bilaterally. Patient is pleasant and does not appear acutely agitated. NIH is 0. Sensations are intact bilaterally. Cranial nerves II through XII are grossly intact. Moves all 4 extremities with normal strength and coordination. Laboratory evaluation is significant for dehydration and DENZEL. Urinalysis shows glucose, trace protein and ketones. Patient has a slightly elevated lipase however abdomen is nontender. Glucose is elevated at 282 however anion gap and bicarb are within normal range. Patient was given fluids for dehydration. Drug screen is negative. No alcohol detected. Troponin is negative. Lactic acid within normal range. Chest x-ray shows no acute cardiopulmonary process including pneumonia. Head CT shows no acute intracranial abnormality. Patient will be admitted for further evaluation of his hallucinations. There was a high probability of life-threatening clinical deterioration in the patient's condition requiring my urgent intervention. The total critical care time spent while evaluating and treating this patient was at least 34 minutes. This excludes time spent doing separately billable procedures. This includes time at the bedside, data interpretation, medication management, obtaining critical history from collateral sources if the patient is unable to provide it directly, and physician consultation.  Specifics of interventions taken and potentially life-threatening diagnostic considerations are listed in the medical decision making. FINAL IMPRESSION      1. Delirium    2. Auditory hallucinations    3. Visual hallucinations    4. DENZEL (acute kidney injury) ([facility] [address]    5. Hyperglycemia          DISPOSITION/PLAN   DISPOSITION Decision To Admit 08/03/2020 07:31:47 PM      PATIENT REFERREDTO:  No follow-up provider specified.     DISCHARGE MEDICATIONS:  New Prescriptions    No medications on file       DISCONTINUED MEDICATIONS:  Discontinued Medications    No medications on file              (Please note that portions of this note were completed with a voice recognition program.  Efforts were made to edit the dictations but occasionally words are mis-transcribed.)    Willa Kelley PA-C (electronically signed)            Willa Kelley PA-C  08/03/20 [id number]

## 2020-08-04 NOTE — PROGRESS NOTES
Patient admission assessment completed with no issues. Patient vitals signs within normal limits. Patient denies any pain at moment. Head to toe assessment completed. Call light within reach. Patient resting at this time.  Will continue to monitor

## 2020-08-04 NOTE — H&P
Hospital Medicine History and Physical    8/3/2020    Date of Admission: 8/3/2020    Date of Service: Pt seen/examined on 8/3/2020 and admitted to inpatient. Assessment/plan:  1. Hallucination. So far, no clear etiology. Suspect may be secondary to DENZEL; treating. No evidence of infection. Monitor closely. If no improvement, consider MRI-brain to rule out CVA. 2. DENZEL. Likely prerenal azotemia due to poor PO intake. Gentle IV fluid overnight. Recheck Cr in the morning. Avoid nephrotoxic medications. 3. Uncontrolled diabetes 2 with hyperglycemia. Scheduled insulin + SSI ordered. Monitor accucheck and adjust insulin dose as needed. 4. Other comorbidities: history of diabetes 2, CVA, essential hypertension, hyperlipidemia, CAD. Activities: Up with assist  Prophylaxis: SC lovenox  Code status: Full    ==========================================================  Chief complaint:  Chief Complaint   Patient presents with    Altered Mental Status     Pt. comes in today thinking that he may be halluicnating. Pt. has a history of diabetes and his blood sugar was 272. Family's friend concerned that pt. may be having a possible stroke. Pt's. son states that he was last known normal on sunday. Son reports that he slept most of the day yesterday. History of Presenting Illness: This is a pleasant [de-identified] y.o. male with history of diabetes 2, CVA, essential hypertension, hyperlipidemia, CAD, who presents to ER with complaints of hallucination; has had auditory hallucination for several days, visual hallucination as of today. He has not had any new medication changes. He has had decreased PO intake. He does not have cough or fever or chills or chest pain or SOB.       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 Authorizing Provider   LANTUS SOLOSTAR 100 UNIT/ML injection pen INJECT 80 UNITS INTO THE SKIN EVERY MORNING (BEFORE BREAKFAST) 6/15/20  Yes Molina Garg MD   B-D ULTRAFINE III SHORT PEN 31G X 8 MM MISC USE TWICE DAILY AS DIRECTED 6/3/20  Yes Molina Garg MD   [employer] PAIN RELIEF EXTRA STRENGTH 500 MG tablet TAKE 1 TABLET BY MOUTH EVERY 6 HOURS AS NEEDED FOR PAIN 2/21/20  Yes Molina Garg MD   vitamin D (ERGOCALCIFEROL) 1.25 MG (77941 UT) CAPS capsule Take 1 capsule by mouth once a week 1/16/20  Yes Molina Garg MD   lisinopril (PRINIVIL;ZESTRIL) 40 MG tablet Take 1 tablet by mouth daily 1/16/20  Yes Molina Garg MD   metoprolol tartrate (LOPRESSOR) 50 MG tablet Take 1 tablet by mouth 2 times daily 1/16/20  Yes Molina Garg MD   amLODIPine (NORVASC) 2.5 MG tablet Take 1 tablet by mouth daily 1/16/20  Yes Molina Garg MD   furosemide (LASIX) 20 MG tablet Take 1 tablet by mouth See Admin Instructions 1/16/20  Yes Molina Garg MD   SITagliptin (JANUVIA) 50 MG tablet Take 1 tablet by mouth daily 1/16/20  Yes Molina Garg MD   aspirin EC 81 MG EC tablet Take 1 tablet by mouth daily 1/16/20 1/15/21 Yes Molina Garg MD   atorvastatin (LIPITOR) 80 MG tablet Take 1 tablet by mouth daily 1/16/20  Yes Molina Garg MD   ferrous sulfate 325 (65 Fe) MG tablet Take 1 tablet by mouth daily (with breakfast) 1/16/20  Yes Molina Garg MD   latanoprost (XALATAN) 0.005 % ophthalmic solution Place 1 drop into the left eye nightly.      Yes Historical Provider, MD   brimonidine-timolol (COMBIGAN) 0.2-0.5 % ophthalmic solution Place 1 drop into both eyes daily. Yes Historical Provider, MD       Allergy(ies):  Pcn [penicillins]    Social History:  TOBACCO:  reports that he quit smoking about 39 years ago. His smoking use included cigarettes. He has a 50.00 pack-year smoking history. He has quit using smokeless tobacco.  ETOH:  reports no history of alcohol use. Family History:      Problem Relation Age of Onset    Diabetes Mother     Cancer Mother     Emphysema Father     Heart Disease Brother        Review of Systems:  Pertinent positives are listed in HPI. At least 10-point ROS reviewed and were negative. Vitals and physical examination:  BP (!) 171/92   Pulse 62   Temp 98.2 °F (36.8 °C) (Oral)   Resp 20   Ht 5' 6\" (1.676 m)   Wt 160 lb (72.6 kg)   SpO2 97%   BMI 25.82 kg/m²   Gen/overall appearance: Not in acute distress. Alert. Oriented X3  Head: Normocephalic, atraumatic  Eyes: EOMI, good acuity  ENT: Oral mucosa moist  Neck: No JVD, thyromegaly  CVS: Nml S1S2, no MRG, RRR  Pulm: Clear bilaterally. No crackles/wheezes  Gastrointestinal: Soft, NT/ND, +BS  Musculoskeletal: No edema. Warm  Neuro: No focal deficit. Moves extremity spontaneously. Psychiatry: Appropriate affect. Not agitated. Skin: Warm, dry with normal turgor.  No rash  Capillary refill: Brisk,< 3 seconds   Peripheral Pulses: +2 palpable, equal bilaterally       Labs/imaging/EKG:  CBC:   Recent Labs     08/03/20  1715   WBC 12.1*   HGB 14.1        BMP:    Recent Labs     08/03/20  1715   *   K 3.9   CL 97*   CO2 27   BUN 29*   CREATININE 2.4*   GLUCOSE 282*     Hepatic:   Recent Labs     08/03/20  1715   AST 16   ALT 12   BILITOT 0.6   ALKPHOS 84       Ct Head Wo Contrast    Result Date: 8/3/2020  EXAMINATION: CT OF THE HEAD WITHOUT CONTRAST  8/3/2020 4:59 pm TECHNIQUE: CT of the head was performed without the administration of intravenous contrast. Dose modulation, iterative reconstruction, and/or weight based adjustment of the mA/kV was utilized to reduce the radiation dose to as low as reasonably achievable. COMPARISON: None. HISTORY: ORDERING SYSTEM PROVIDED HISTORY: ams TECHNOLOGIST PROVIDED HISTORY: Reason for exam:->ams Has a \"code stroke\" or \"stroke alert\" been called? ->No Reason for Exam: AMS. Acuity: Acute Type of Exam: Initial FINDINGS: BRAIN/VENTRICLES: There is no acute intracranial hemorrhage, mass effect, or midline shift. No abnormal extra-axial fluid collection. The gray-white differentiation is maintained without evidence of an acute infarct. Focal hypodensity involving the left basal ganglia and right caudate head, likely correlating with remote lacunar infarct. There is diffuse parenchymal volume loss, likely age related. Small focus of CSF density anterior to the left temporal horn likely representing a small arachnoid cyst. ORBITS: The visualized portion of the orbits demonstrate no acute abnormality. SINUSES: The visualized paranasal sinuses and mastoid air cells demonstrate no acute abnormality. SOFT TISSUES/SKULL:  No acute abnormality of the visualized skull or soft tissues. 1. No acute intracranial abnormality. 2. Findings compatible with remote lacunar infarcts involving the left basal ganglia and right caudate head. Xr Chest Portable    Result Date: 8/3/2020  EXAMINATION: ONE XRAY VIEW OF THE CHEST 8/3/2020 5:18 pm COMPARISON: Chest radiograph performed 05/11/2012. HISTORY: ORDERING SYSTEM PROVIDED HISTORY: SOB TECHNOLOGIST PROVIDED HISTORY: Reason for exam:->SOB Reason for Exam: ALTERED MENTAL STATUS. FORMER SMOKER. HISTORY OF DIABETES, CABG, HTN AND A-FIB. Acuity: Acute Type of Exam: Initial FINDINGS: There is no acute consolidation or effusion. There is a stable left lung granuloma. There is no pneumothorax. The mediastinal structures are unremarkable. The upper abdomen is unremarkable. The extrathoracic soft tissues are unremarkable. There is no acute osseous abnormality. No acute cardiopulmonary process. EKG: Sinus rhythm with PACs, rate 64 BPM. No acute ST/T changes. I reviewed EKG. Discussed with ER provider.       Thank you Rosa Isela Gresham MD for the opportunity to be involved in this patient's care.    -----------------------------  Aide Hardin MD  [facility] Hospitalist

## 2020-08-04 NOTE — PROGRESS NOTES
Hospitalist Progress Note      PCP: Chris Treadwell MD    Date of Admission: 8/3/2020    Chief Complaint: Hallucinations    Hospital Course: This morning patient feels well  States that he has been had seeing and hearing things for the past 2 days on further questioning states this has been intermittent for the past year but got worse in the last last few days  Is not able to elaborate anymore on that  Denies any chest pain shortness of breath  I called and spoke to his son who gives me exactly the same history  He has not noticed anything different about his father just the fact that he had this hallucinations and given that there was COVID-19 the son decided to bring him to the hospital  No fevers  No chest pain shortness of breath  Has been eating and drinking well  Manages his activities of daily living        Medications:  Reviewed      Exam:    BP (!) 151/74   Pulse 58   Temp 98.6 °F (37 °C) (Oral)   Resp 16   Ht 5' 6\" (1.676 m)   Wt 160 lb (72.6 kg)   SpO2 95%   BMI 25.82 kg/m²     General appearance: No apparent distress, appears stated age and cooperative. HEENT: Pupils equal, round, and reactive to light. Conjunctivae/corneas clear. Neck: Supple, with full range of motion. No jugular venous distention. Trachea midline. Respiratory:  Normal respiratory effort. Clear to auscultation, bilaterally without RALES/WHEEZES/Rhonchi. Cardiovascular: Regular rate and rhythm with normal S1/S2 without MURMURS, rubs or gallops. Abdomen: Soft, non-tender, non-distended with normal bowel sounds. Musculoskeletal: No clubbing, cyanosis or EDEMA bilaterally. Full range of motion without deformity. Skin: Skin color, texture, turgor normal.  No rashes or lesions. Neurologic:  Neurovascularly intact without any focal sensory/motor deficits.  Cranial nerves: II-XII intact, grossly non-focal.  Awake alert oriented to time place and person      Labs:   Recent Labs     08/03/20  1715 08/04/20  0540   WBC 12.1* 11.5*   HGB 14.1 12.8*   HCT 42.0 39.5*    175     Recent Labs     08/03/20  1715 08/04/20  0540   * 138   K 3.9 3.3*   CL 97* 106   CO2 27 24   BUN 29* 22*   CREATININE 2.4* 1.4*   CALCIUM 9.5 8.3   PHOS  --  2.7     Recent Labs     08/03/20  1715 08/04/20  0540   AST 16 13*   ALT 12 9*   BILITOT 0.6 0.5   ALKPHOS 84 70     No results for input(s): INR in the last 72 hours. Recent Labs     08/03/20  1715   TROPONINI 0.01       Urinalysis:      Lab Results   Component Value Date    NITRU Negative 08/03/2020    WBCUA 3 08/03/2020    BACTERIA Rare 11/13/2013    RBCUA 4 08/03/2020    BLOODU Negative 08/03/2020    SPECGRAV 1.023 08/03/2020    GLUCOSEU >=1000 08/03/2020    GLUCOSEU 500 05/04/2012       Radiology:  XR CHEST PORTABLE   Final Result   No acute cardiopulmonary process. CT HEAD WO CONTRAST   Final Result   1. No acute intracranial abnormality. 2. Findings compatible with remote lacunar infarcts involving the left basal   ganglia and right caudate head.          MRI BRAIN WO CONTRAST    (Results Pending)       Assessment/Plan:    Active Hospital Problems    Diagnosis Date Noted    Hallucination [R44.3] 08/03/2020    Acute kidney injury ([facility] [address] [N17.9] 08/03/2020       Acute Medical Issues Being Addressed:    34-year-old gentleman admitted to the hospital with hallucinations    Hallucinations may be related to dehydration and acute kidney injury  CT head showed a remote lacunar infarct otherwise no abnormalities  No evidence of acute infection urinalysis negative and chest x-ray normal  I have reviewed his home medication list as well  Ammonia is normal  Check TSH and B12  We will get an MRI of the brain  Dehydration has been corrected  Does not report any further hallucination  We will monitor over the next 24 hours    Acute kidney injury on baseline of chronic kidney disease stage III  Baseline creatinine of 1.4  Elevated to 2.4 resolved with IV fluids  Stop IV fluids    Type 2 diabetes mellitus  We will confirm home medication  Continue Lantus and sliding scale for now    Hypertension  Hold lisinopril  Change to amlodipine    Hyperlipidemia  Continue statin    All of the above discussed at length with the patient and also called his son and updated          DVT Prophylaxis:  Sc heparin   Diet: DIET CARB CONTROL;  Code Status: Full Code      Dispo - once acute medical processes have resolved    Simi Smith MD

## 2020-08-05 PROBLEM — R44.0 AUDITORY HALLUCINATIONS: Status: ACTIVE | Noted: 2020-01-01

## 2020-08-05 NOTE — PROGRESS NOTES
Patient advised that the plan is to have test results from MRI and EEG reviewed by Neurology. After consult complete will have a better view of the plan of care. Patient verbalizes understanding of advise given and will review plan of care with physician. Patient up in chair, call light in reach, will continue to monitor.

## 2020-08-05 NOTE — CARE COORDINATION
Cm reviewing chart for d/c planning. Therapy recommendations noted for snf. CM went and spoke to pt and son Cassaundra Buerger who is at bedside. Pt able to state name, birth date, where he is and current year when asked. Son states he feels he is improving with mental status. CM discussed snf recommendation and provided Medicare approved facility list along with medicare/medicaid star ratings list. CM asked if pt is home alone and son states that pt's other son lives with him and after 3pm someone is home but not during the day. Son and pt will discuss lists and CM will follow up for choices at later time.      Kings Rocha RN, BSN  [phone]

## 2020-08-05 NOTE — PROGRESS NOTES
Occupational Therapy   Occupational Therapy Initial Assessment  Date: 2020   Patient Name: John Pisano  MRN: [id number]     : 1940    Date of Service: 2020    Discharge Recommendations: [employer] scored a 16/24 on the AM-PAC ADL Inpatient form. Current research shows that an AM-PAC score of 17 or less is typically not associated with a discharge to the patient's home setting. Based on the patient's AM-PAC score and their current ADL deficits, it is recommended that the patient have 3-5 sessions per week of Occupational Therapy at d/c to increase the patient's independence. Please see assessment section for further patient specific details. If patient discharges prior to next session this note will serve as a discharge summary. Please see below for the latest assessment towards goals. OT Equipment Recommendations  Equipment Needed: No  Other: cont to assess    Assessment   Performance deficits / Impairments: Decreased functional mobility ; Decreased endurance;Decreased ADL status; Decreased cognition;Decreased balance;Decreased high-level IADLs  Assessment: Patient presents below baseline d/t above deficits; OT indicated to maximize safety/independence with ADL and IADL  Treatment Diagnosis: Above deficits associated with hallucination  Prognosis: Good  Decision Making: Medium Complexity  Exam: as above  OT Education: OT Role;Plan of Care  Patient Education: eval, discharge - patient unable to independently verbalize understanding at this time, will require reinforcement  REQUIRES OT FOLLOW UP: Yes  Activity Tolerance  Activity Tolerance: Patient Tolerated treatment well;Treatment limited secondary to decreased cognition  Safety Devices  Safety Devices in place: Yes  Type of devices: All fall risk precautions in place; Left in chair;Call light within reach; Chair alarm in place;Nurse notified;Gait belt           Patient Diagnosis(es): The primary encounter diagnosis was Delirium. Diagnoses of Auditory hallucinations, Visual hallucinations, DENZEL (acute kidney injury) ([facility] [address] and Hyperglycemia were also pertinent to this visit. has a past medical history of A-fib ([facility] [address] Acute kidney injury ([facility] [address] Colon polyp, Glaucoma, HTN (hypertension), Hyperlipidemia, Hypertension, Osteoarthritis, S/P CABG x 4, Type II or unspecified type diabetes mellitus without mention of complication, not stated as uncontrolled, and Unspecified sleep apnea. has a past surgical history that includes Vasectomy (1974); eye surgery; and Colonoscopy. Treatment Diagnosis: Above deficits associated with hallucination      Restrictions  Restrictions/Precautions  Restrictions/Precautions: Fall Risk(high fall risk,up witj assist)  Position Activity Restriction  Other position/activity restrictions: Kimberly Taylor is a [de-identified] y.o. male patient presents emergency department for evaluation of hallucinations. Patient states he started seeing people coming and going through the ceiling of his room. He has a friend that lives with him who states that this is not typical for this patient. Patient is diabetic and blood sugar was 272 today. Patient was acting normal on Sunday and slept most of the day yesterday. Subjective   General  Chart Reviewed: Yes  Diagnosis: Hallucination  Subjective  Subjective: Patient lying upright in bed upon arrival, agreeable to evaluation.  Pleasantly confused, denies pain  Patient Currently in Pain: Denies  Vital Signs  Patient Currently in Pain: Denies  Social/Functional History  Social/Functional History  Lives With: Son  Type of Home: House  Home Layout: Multi-level(quad level; 6-9 steps at each level)  Home Access: Stairs to enter with rails  Entrance Stairs - Number of Steps: 1  Bathroom Shower/Tub: Tub/Shower unit  Bathroom Toilet: Bedside commode(and standard toilet)  Home Equipment: Cane, Standard walker, Wheelchair-manual(have a chair stair lift, but not in use)  ADL Assistance: catheter)  Additional Comments: Pt seated at [facility] as chair in bathroom for ADL as described above - requires min/mod A in stance at times d/t posterior lean, able to correct with CGA and verbal cuing  Tone RUE  RUE Tone: Normotonic  Tone LUE  LUE Tone: Normotonic     Bed mobility  Supine to Sit: Supervision  Scooting: Stand by assistance(for safety d/t pt impulsivity, attempting to stand immediately after bed mobility despite cuing to remain seated)  Transfers  Sit to stand: Contact guard assistance  Stand to sit: Contact guard assistance  Transfer Comments: Pt impulsive with sit<>stand; able to complete SBA but unsafe d/t impulsivity. Demonstrates difficulty completing and requires increased assist when attempting to stand after therapist prompts     Cognition  Overall Cognitive Status: Exceptions  Arousal/Alertness: Inconsistent responses to stimuli  Following Commands: Inconsistently follows commands; Follows one step commands with repetition  Attention Span: Difficulty dividing attention; Difficulty attending to directions; Attends with cues to redirect  Safety Judgement: Decreased awareness of need for assistance;Decreased awareness of need for safety  Problem Solving: Assistance required to implement solutions;Assistance required to generate solutions  Insights: Decreased awareness of deficits  Initiation: Requires cues for some  Sequencing: Requires cues for some  Perception  Overall Perceptual Status: Impaired     Sensation  Overall Sensation Status: WFL        LUE AROM (degrees)  LUE AROM : WFL  RUE AROM (degrees)  RUE AROM : WFL  LUE Strength  Gross LUE Strength: WFL  RUE Strength  Gross RUE Strength: WFL                   Plan   Plan  Times per week: 3-5  Times per day: Daily  Current Treatment Recommendations: Strengthening, Patient/Caregiver Education & Training, Equipment Evaluation, Education, & procurement, Balance Training, Functional Mobility Training, Endurance Training, Self-Care / ADL    G-Code

## 2020-08-05 NOTE — CONSULTS
Mother     Emphysema Father     Heart Disease Brother      Past Surgical History:   Procedure Laterality Date    COLONOSCOPY      EYE SURGERY      VASECTOMY  1974        Past Medical History:   Diagnosis Date    A[facility]fib [facility] [address] 2012    Acute kidney injury ([facility] [address] 8/3/2020    Colon polyp     Glaucoma     HTN (hypertension) 2012    Hyperlipidemia     Hypertension     Osteoarthritis     S/P CABG x 4 2013    Type II or unspecified type diabetes mellitus without mention of complication, not stated as uncontrolled     Unspecified sleep apnea      Social History     Tobacco Use    Smoking status: Former Smoker     Packs/day: 2.00     Years: 25.00     Pack years: 50.00     Types: Cigarettes     Last attempt to quit: 1981     Years since quittin.6    Smokeless tobacco: Former User   Substance Use Topics    Alcohol use: No    Drug use: No     Allergies   Allergen Reactions    Pcn [Penicillins] Rash     Current Facility-Administered Medications   Medication Dose Route Frequency Provider Last Rate Last Dose    magnesium sulfate 4 g in 100 mL IVPB premix  4 g Intravenous Once Franklin Butler MD        aspirin EC tablet 81 mg  81 mg Oral Daily Cristela Bill MD   81 mg at 20 0914    atorvastatin (LIPITOR) tablet 80 mg  80 mg Oral Daily Cristela Bill MD   80 mg at 20 0914    ferrous sulfate (IRON 325) tablet 325 mg  325 mg Oral Daily with breakfast Cristela Bill MD   325 mg at 20 0815    latanoprost (XALATAN) 0.005 % ophthalmic solution 1 drop  1 drop Left Eye Nightly Cristela Bill MD   1 drop at 20 205    metoprolol tartrate (LOPRESSOR) tablet 50 mg  50 mg Oral BID Cristela Bill MD   50 mg at 20 0915    insulin lispro (1 Unit Dial) 4 Units  0.05 Units/kg Subcutaneous TID WC Marcel Cervantes MD   4 Units at 20 1711    glucose (GLUTOSE) 40 % oral gel 15 g  15 g Oral PRN Cristela Bill MD        dextrose 50 % IV solution  12.5 g 08/04/20 1545 08/04/20 2051 08/05/20 0410   BP: (!) 151/74 (!) 179/66 (!) 150/63 (!) 153/72   Pulse: 58 61 65 58   Resp: 16 16 18 18   Temp: 98.6 °F (37 °C) 98.5 °F (36.9 °C) 98 °F (36.7 °C) 97.8 °F (36.6 °C)   TempSrc: Oral Oral Oral Oral   SpO2: 95% 96% 95% 96%   Weight:       Height:           General appearance:  Normal development and appear in no acute distress. Eye: No icterus. Fundus: Funduscopic examination cannot be performed due to COVID19 restrictions and precautions. Neck: supple  Cardiovascular: No lower leg edema with good pulsation. Mental Status:   AAO x3  Poor attention span and concentration  Poor immediate recall  Intact remote memory  Language is fluent with no aphasia  Poor fund of knowledge and poor insight. Cranial Nerves:   II: Visual fields: Full. Pupils: equal, round, reactive to light  III,IV,VI: Extra Ocular Movements are intact. No nystagmus  V: Facial sensation is intact   VII: Facial strength and movements: intact and symmetric  VIII: Hearing: Intact to finger rub bilaterally  IX: Palate elevation is symmetric  XI: Shoulder shrug is intact  XII: Tongue movements are normal  Musculoskeletal: 5/5 in all 4 extremities. Tone: Normal tone. Reflexes: Bilateral biceps 2/4, triceps 2/4, brachial radialis 2/4, knee 2/4 and ankle 2/4. Planters: flexor bilaterally. Coordination: no pronator drift, no dysmetria with FNF in upper extremities. Normal REM. Sensation: normal to all modalities in both arms and legs. Gait/Posture: steady gait and normal posturing and station.      Data:  LABS:   Lab Results   Component Value Date     08/05/2020    K 3.8 08/05/2020     08/05/2020    CO2 25 08/05/2020    BUN 18 08/05/2020    CREATININE 1.3 08/05/2020    GFRAA >60 08/05/2020    GFRAA >60 05/28/2013    LABGLOM 53 08/05/2020    GLUCOSE 86 08/05/2020    PHOS 2.7 08/04/2020    MG 1.50 08/05/2020    CALCIUM 7.9 08/05/2020     Lab Results   Component Value Date    WBC 10.8 08/05/2020    RBC 4.33 08/05/2020    HGB 12.3 08/05/2020    HCT 37.6 08/05/2020    MCV 86.8 08/05/2020    RDW 13.7 08/05/2020     08/05/2020     Lab Results   Component Value Date    INR 1.01 08/05/2020    PROTIME 11.7 08/05/2020       Neuroimaging were independently reviewed by myself and discussed results with the patient including MRI results. Reviewed notes from different physicians  Reviewed lab and blood testing    Impression:  Acute encephalopathy with hallucination and psychosis, severe. This occurred in the setting of an abnormal MRI which I did review today. MRI showed bitemporal increased flair signal more left and right which can be seen in patient with encephalitis, paraneoplastic syndromes, recurrent seizures and less likely underlying neoplasm. Will need further work-up to help narrow down this differential diagnosis. Hypertension  Diabetes, not controlled with A1c of 9.6  Acute kidney injury, improved  Hyperlipidemia  Chronic cognitive impairment and likely underlying dementia. Recommendation:  Check inflammatory markers in the serum  We will schedule LP under IR to look for different inflammatory markers in the CSF and rule out infectious etiology although seems less likely  Get EEG to rule out recurrent complex partial seizure or complex partialis continua  Insulin sliding scale  Blood sugar monitor and control  Telemetry  PT and OT  Speech evaluation  Continue current blood pressure medications and blood pressure monitor  Aspirin  Statin  Echo  We will follow for further recommendation  MDM: High complexity due to high risk morbidity and mortality and multiorgan involvement. We will follow. Thank you for referring such patient. If you have any questions regarding my consult note, please don't hesitate to call me. Allyson Elder MD  [phone]    This dictation was generated by voice recognition computer software.  Although all attempts are made to edit the dictation for

## 2020-08-05 NOTE — PROGRESS NOTES
Physical Therapy    Facility/Department: [address] ONCOLOGY  Initial Assessment    NAME: Kody Kaiser  : 1940  MRN: [id number]    Date of Service: 2020    Discharge Recommendations:  Kody Kaiser scored a 18/24 on the AM-PAC short mobility form. Current research shows that an AM-PAC score of 17 or less is typically not associated with a discharge to the patient's home setting. Based on the patient's AM-PAC score and their current functional mobility deficits, it is recommended that the patient have 3-5 sessions per week of Physical Therapy at d/c to increase the patient's independence. Please see assessment section for further patient specific details. If patient discharges prior to next session this note will serve as a discharge summary. Please see below for the latest assessment towards goals. 3-5 sessions per week   PT Equipment Recommendations  Equipment Needed: No    Assessment   Body structures, Functions, Activity limitations: Decreased functional mobility ; Decreased strength;Decreased safe awareness;Decreased balance;Decreased endurance  Assessment: Pt agreeable for therapy and performed bed mobility with supervision, transfers and gait with CGA-min A. Pt not always aware of safe awareness and had decreased attention span. Pt had a 3-4 LOB during pericare/ADL while standing. Due to these deficits and that the pt is not at independent baseline, pt is recommended for skilled PT for safety. Treatment Diagnosis: decreased functional mobility, strength, safe awareness, balance, endurance  Prognosis: Good  Decision Making: Low Complexity  PT Education: Plan of Care;PT Role;General Safety  Patient Education: Pt educated on PT role, POC, general safety. Pt verbally understood.   Barriers to Learning: difficulty of hearing, otherwise none  REQUIRES PT FOLLOW UP: Yes  Activity Tolerance  Activity Tolerance: Patient Tolerated treatment well       Patient Diagnosis(es): The primary encounter diagnosis was Delirium. Diagnoses of Auditory hallucinations, Visual hallucinations, DENZEL (acute kidney injury) ([facility] [address] and Hyperglycemia were also pertinent to this visit. has a past medical history of A-fib ([facility] [address] Acute kidney injury ([facility] [address] Colon polyp, Glaucoma, HTN (hypertension), Hyperlipidemia, Hypertension, Osteoarthritis, S/P CABG x 4, Type II or unspecified type diabetes mellitus without mention of complication, not stated as uncontrolled, and Unspecified sleep apnea. has a past surgical history that includes Vasectomy (1974); eye surgery; and Colonoscopy. Restrictions  Restrictions/Precautions  Restrictions/Precautions: Fall Risk(high fall risk,up witj assist)  Required Braces or Orthoses?: No  Position Activity Restriction  Other position/activity restrictions: Ramos Rees is a [de-identified] y.o. male patient presents emergency department for evaluation of hallucinations. Patient states he started seeing people coming and going through the ceiling of his room. He has a friend that lives with him who states that this is not typical for this patient. Patient is diabetic and blood sugar was 272 today. Patient was acting normal on Sunday and slept most of the day yesterday.   Vision/Hearing  Vision: Impaired  Vision Exceptions: Wears glasses for reading  Hearing: Exceptions to [facility]     Subjective  General  Chart Reviewed: Yes  Patient assessed for rehabilitation services?: Yes  Family / Caregiver Present: No  Diagnosis: Hallucinations  Follows Commands: Within Functional Limits  General Comment  Comments: Upon arrival, pt found in supine  Subjective  Subjective: Pt agreeable to therapy and denies pain  Pain Screening  Patient Currently in Pain: Denies  Vital Signs  Patient Currently in Pain: Denies       Orientation  Orientation  Overall Orientation Status: Within Functional Limits  Social/Functional History  Social/Functional History  Lives With: Son  Type of Home: House  Home Layout: Multi-level(quad level; Training, Stair training, Transfer Training  Plan Comment: Assess stair training for home mobility  Safety Devices  Type of devices: All fall risk precautions in place, Gait belt, Left in chair, Nurse notified, Chair alarm in place, Call light within reach  Restraints  Initially in place: No      AM-PAC Score  AM-PAC Inpatient Mobility Raw Score : 18 (08/05/20 1305)  AM-PAC Inpatient T-Scale Score : 43.63 (08/05/20 1305)  Mobility Inpatient CMS 0-100% Score: 46.58 (08/05/20 1305)  Mobility Inpatient CMS G-Code Modifier : CK (08/05/20 1305)          Goals  Short term goals  Time Frame for Short term goals: upon d/c  Short term goal 1: Pt will perform independent bed mobility  Short term goal 2: pt will perform SBA for transfers  Short term goal 3: pt will ambulate 48' with CGA  Short term goal 4: pt will climb 6 stairs with rail with min A       Therapy Time   Individual Concurrent Group Co-treatment   Time In [facility]         Time Out 1132         Minutes 55         Timed Code Treatment Minutes: [place], [facility]  Sai Woodard, PT    Therapist observed and directed the above evaluation.  Thanks, Sai Woodard, [address] [facility], DPT [id number]

## 2020-08-06 NOTE — DISCHARGE INSTR - COC
Continuity of Care Form    Patient Name: Toma Chowdhury   :  1940  MRN:  [id number]    Admit date:  8/3/2020  Discharge date: 2020    Code Status Order: Full Code   Advance Directives:   [facility] Documentation     Date/Time Healthcare Directive Type of Healthcare Directive Copy in [facility] [address] Agent's Name Healthcare Agent's Phone Number    20 0130  Unknown, patient unable to respond due to medical condition -- -- -- -- --          Admitting Physician:  Brian Hoover MD  PCP: Brenda Sultana MD    Discharging Nurse: [facility] Unit/Room#: 5RX-0802/3048-90  Discharging Unit Phone Number: [phone]    Emergency Contact:   Extended Emergency Contact Information  Primary Emergency Contact: Vamsi Velazquez   [address] Phone: [phone]  Work Phone: [phone]  Mobile Phone: [phone]  Relation: Child    Past Surgical History:  Past Surgical History:   Procedure Laterality Date    COLONOSCOPY      EYE SURGERY      VASECTOMY  1974       Immunization History:   Immunization History   Administered Date(s) Administered    Influenza, High Dose (Fluzone 65 yrs and older) 2012, 2012, 2013, 10/24/2014, 10/24/2014, 10/02/2015, 10/05/2016, 2017, 10/17/2018    Pneumococcal Conjugate 13-valent ([id number]) 2016    Pneumococcal Polysaccharide ([id number]) 2012    Tdap (Boostrix, Adacel) 2017    Zoster Recombinant (Shingrix) 2018       Active Problems:  Patient Active Problem List   Diagnosis Code    HTN (hypertension), benign I10    Hyperlipemia E78.5    Smoking history Z87.891    DM (diabetes mellitus), type 2, uncontrolled with complications ([facility] [address] L02.3, E11.65    Auditory hallucinations R44.0    Acute kidney injury ([facility] [address] N17.9    Delirium R41.0    Acute encephalopathy G93.40    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       Isolation/Infection:   Isolation          No Isolation        Patient Infection Status     None to display          Nurse Assessment:  Last Vital Signs: /74   Pulse 61   Temp 99.1 °F (37.3 °C) (Oral)   Resp 16   Ht 5' 6\" (1.676 m)   Wt 160 lb (72.6 kg)   SpO2 94%   BMI 25.82 kg/m²     Last documented pain score (0-10 scale): Pain Level: 6  Last Weight:   Wt Readings from Last 1 Encounters:   08/03/20 160 lb (72.6 kg)     Mental Status:  oriented and alert    IV Access:  - None    Nursing Mobility/ADLs:  Walking   Assisted  Transfer  Assisted  Bathing  Assisted  Dressing  Assisted  Toileting  Assisted  Feeding  Assisted  Med Admin  Assisted  Med Delivery   whole    Wound Care Documentation and Therapy:  Wound 05/11/12 Skin tear Leg Right Skin tear (Active)   Number of days: 3008       Incision 05/07/12 Sternum Anterior;Mid (Active)   Number of days: 3012       Incision 05/07/12 Leg Right (Active)   Number of days: 3012       Incision 05/07/12 Leg Left (Active)   Number of days: 3012        Elimination:  Continence:   · Bowel: Yes  · Bladder: Yes  Urinary Catheter: None   Colostomy/Ileostomy/Ileal Conduit: No       Date of Last BM: 08/06/2020    Intake/Output Summary (Last 24 hours) at 8/6/2020 1308  Last data filed at 8/6/2020 0418  Gross per 24 hour   Intake 420 ml   Output 1300 ml   Net -880 ml     I/O last 3 completed shifts: In: 5 [P.O.:720]  Out: 1300 [Urine:1300]    Safety Concerns: At Risk for Falls    Impairments/Disabilities:      Vision and Hearing    Nutrition Therapy:  Current Nutrition Therapy:   - Oral Diet:  Carb Control 4 carbs/meal (1800kcals/day)    Routes of Feeding: Oral  Liquids: Thin Liquids  Daily Fluid Restriction: no  Last Modified Barium Swallow with Video (Video Swallowing Test): not done    Treatments at the Time of Hospital Discharge:   Respiratory Treatments:   Oxygen Therapy:  is not on home oxygen therapy.   Ventilator:    - No ventilator support    Rehab Therapies:   Weight Bearing Status/Restrictions: No

## 2020-08-06 NOTE — CARE COORDINATION
RN gave CM pt and families choices for snf. Right fax referrals sent to: [facility], [address] [place] [address], [facility] [address] [place] [address], [address] [place] [address]. CM will continue to follow for d/c planning.      Andrea Urban RN, BSN  [phone]

## 2020-08-06 NOTE — PROGRESS NOTES
Toma Chowdhury  Neurology Follow-up  [facility] Neurology    Date of Service: 8/6/2020    Subjective:   CC: Follow up today regarding: Acute psychosis and delirium. Events noted. Chart and lab reviewed. The patient denies any new symptoms today. He describes less visual and auditory hallucination. No suicidal ideation or thoughts. Auditory hallucination mainly hearing music. He had his spinal tap which was unremarkable except for related CSF protein of 76. No white blood cells and normal glucose. Inflammatory markers so far came back unremarkable. EEG showed no epileptiform discharges. Denies any headache, chest pain, dysphagia, dysarthria fever and chills. Other review of system was unremarkable. ROS : A 10-12 system review obtained and updated today and is unremarkable except as mentioned  in my interval history. family history includes Cancer in his mother; Diabetes in his mother; Emphysema in his father; Heart Disease in his brother.     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    Type II or unspecified type diabetes mellitus without mention of complication, not stated as uncontrolled     Unspecified sleep apnea      Current Facility-Administered Medications   Medication Dose Route Frequency Provider Last Rate Last Dose    insulin glargine (LANTUS;BASAGLAR) injection pen 8 Units  8 Units Subcutaneous Nightly Tita Kevin MD   8 Units at 08/05/20 2147    insulin lispro (1 Unit Dial) 0-6 Units  0-6 Units Subcutaneous TID  Tita Kevin MD   2 Units at 08/06/20 1235    insulin lispro (1 Unit Dial) 0-3 Units  0-3 Units Subcutaneous Nightly Tita Kevin MD   1 Units at 08/05/20 2147    sodium chloride flush 0.9 % injection 10 mL  10 mL Intravenous 2 times per day Ivonne Reyna Alin Grullon MD   10 mL at 08/06/20 0912    sodium chloride flush 0.9 % injection 10 mL  10 mL Intravenous PRN Laly Oviedo MD        aspirin EC tablet 81 mg  81 mg Oral Daily Shanna Bush MD   81 mg at 08/06/20 0911    atorvastatin (LIPITOR) tablet 80 mg  80 mg Oral Daily Shanna Bush MD   80 mg at 08/06/20 2183    ferrous sulfate (IRON 325) tablet 325 mg  325 mg Oral Daily with breakfast Shanna Bush MD   325 mg at 08/06/20 0911    latanoprost (XALATAN) 0.005 % ophthalmic solution 1 drop  1 drop Left Eye Nightly Shanna Bush MD   1 drop at 08/05/20 2147    metoprolol tartrate (LOPRESSOR) tablet 50 mg  50 mg Oral BID Shanna Bush MD   50 mg at 08/06/20 0911    glucose (GLUTOSE) 40 % oral gel 15 g  15 g Oral PRN Shanna Bush MD        dextrose 50 % IV solution  12.5 g Intravenous PRN Shanna Bush MD        glucagon (rDNA) injection 1 mg  1 mg Intramuscular PRN Shanna Bush MD        dextrose 5 % solution  100 mL/hr Intravenous PRN Shanna Bush MD        sodium chloride flush 0.9 % injection 10 mL  10 mL Intravenous 2 times per day Shanna Bush MD   10 mL at 08/05/20 2149    sodium chloride flush 0.9 % injection 10 mL  10 mL Intravenous PRN Shanna Bush MD        acetaminophen (TYLENOL) tablet 650 mg  650 mg Oral Q6H PRN Shanna Bush MD   650 mg at 08/05/20 2150    Or    acetaminophen (TYLENOL) suppository 650 mg  650 mg Rectal Q6H PRN Shanna Bush MD        polyethylene glycol (GLYCOLAX) packet 17 g  17 g Oral Daily PRN Shanna Bush MD        promethazine (PHENERGAN) tablet 12.5 mg  12.5 mg Oral Q6H PRN Shanna Bush MD        Or    ondansetron (ZOFRAN) injection 4 mg  4 mg Intravenous Q6H PRN Shanna Bush MD        brimonidine (ALPHAGAN) 0.2 % ophthalmic solution 1 drop  1 drop Both Eyes Daily Shanna Bush MD   1 drop at 08/06/20 0913    And    timolol (TIMOPTIC) 0.5 % ophthalmic solution 1 drop  1 drop Both Eyes Daily Vonda Chan MD   1 drop at 08/06/20 0913    amLODIPine (NORVASC) tablet 5 mg  5 mg Oral Daily Margarita Smith MD   5 mg at 08/06/20 0911     Allergies   Allergen Reactions    Pcn [Penicillins] Rash      reports that he quit smoking about 39 years ago. His smoking use included cigarettes. He has a 50.00 pack-year smoking history. He has quit using smokeless tobacco. He reports that he does not drink alcohol or use drugs. Objective:  Exam:   Constitutional:   Vitals:    08/06/20 0418 08/06/20 0900 08/06/20 0935 08/06/20 1216   BP: (!) 144/72 126/60  120/74   Pulse: 68 60  61   Resp: 18 16  16   Temp: 98.8 °F (37.1 °C) 97.4 °F (36.3 °C)  99.1 °F (37.3 °C)   TempSrc: Oral Axillary  Oral   SpO2: 95% 94% 94% 94%   Weight:       Height:         General appearance:  Normal development and appear in no acute distress. Eye: No icterus. Neck: supple  Cardiovascular:  No lower leg edema with good pulsation. Mental Status:   AAO x2  Poor immediate recall  Intact remote memory  Normal attention today  Language is fluent. Poor fund of knowledge. Cranial Nerves:   II: Visual fields: Full. Pupils: equal, round, reactive to light  III,IV,VI: Extra Ocular Movements are intact. No nystagmus  V: Facial sensation is intact  VII: Facial strength and movements: intact and symmetric  IX: Palate elevation is symmetric  XI: Shoulder shrug is intact  XII: Tongue movements are normal  Musculoskeletal: 5/5 in all 4 extremities. Tone: Normal tone. Reflexes: Symmetric in both arms and legs. Coordination: no pronator drift, no dysmetria with FNF. Normal REM. Sensation: normal to all modalities in both arms and legs.   Gait/Posture: steady gait        Data:  LABS:   Lab Results   Component Value Date     08/06/2020    K 3.9 08/06/2020     08/06/2020    CO2 23 08/06/2020    BUN 19 08/06/2020    CREATININE 1.0 08/06/2020    GFRAA >60 08/06/2020    GFRAA >60 05/28/2013    LABGLOM >60 08/06/2020 GLUCOSE 144 08/06/2020    PHOS 2.7 08/04/2020    MG 1.90 08/06/2020    CALCIUM 7.7 08/06/2020     Lab Results   Component Value Date    WBC 9.1 08/06/2020    RBC 4.22 08/06/2020    HGB 12.0 08/06/2020    HCT 36.5 08/06/2020    MCV 86.7 08/06/2020    RDW 13.5 08/06/2020     08/06/2020     Lab Results   Component Value Date    INR 1.01 08/05/2020    PROTIME 11.7 08/05/2020       Neuroimaging was independently reviewed by me and discussed results with the patient  I reviewed blood testing and other test results and discussed results with the patient      Impression:  Acute encephalopathy with hallucination and psychosis. He is about the same. No change. So far CSF is not consistent with significant inflammatory or infectious etiology. 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 Possibility of complex partial seizure or paraneoplastic syndrome cannot be fully excluded. Since the patient is improving, I would not start new AED at this point as it can increase his psychosis and sedation. At some point, I would like to repeat the LP with large volume tap to follow-up on his CSF protein and other inflammatory markers. This can be done in outpatient setting. Consider psychiatry consultation. Patient can be discharged medically stable and follow-up with me in an outpatient setting in 2 to 3 weeks with his family for further work-up which is indicated. This will include repeating MRI brain in 3 to 4 weeks with contrast.  If these abnormalities are postictal effect, most likely will be transient and improved with subsequent MRI brain. The patient does drive. He was advised not to drive until he is cleared from his physician.     Diabetes, not controlled  Insulin sliding scale  Blood sugar monitor and control  Follow A1c with PCP    Chronic cognitive impairment and underlying dementia  We will have further work-up in the outpatient setting  No driving    Hypertension  Continue current medication monitor blood

## 2020-08-06 NOTE — PROGRESS NOTES
(acute kidney injury) ([facility] [address] and Hyperglycemia were also pertinent to this visit. has a past medical history of A-fib ([facility] [address] Acute kidney injury ([facility] [address] Colon polyp, Glaucoma, HTN (hypertension), Hyperlipidemia, Hypertension, Osteoarthritis, S/P CABG x 4, Type II or unspecified type diabetes mellitus without mention of complication, not stated as uncontrolled, and Unspecified sleep apnea. has a past surgical history that includes Vasectomy (1974); eye surgery; and Colonoscopy. Restrictions  Restrictions/Precautions  Restrictions/Precautions: Fall Risk(high fall risk, up with assist)  Required Braces or Orthoses?: No  Position Activity Restriction  Other position/activity restrictions: Denisa Rincon is a [de-identified] y.o. male patient presents emergency department for evaluation of hallucinations. Patient states he started seeing people coming and going through the ceiling of his room. He has a friend that lives with him who states that this is not typical for this patient. Patient is diabetic and blood sugar was 272 today. Patient was acting normal on Sunday and slept most of the day yesterday. Subjective   General  Chart Reviewed: Yes  Patient assessed for rehabilitation services?: Yes  Response to previous treatment: Patient with no complaints from previous session  Family / Caregiver Present: Yes(DIL)  Diagnosis: Hallucination  Subjective  Subjective: Patient lying upright in bed upon arrival, agreeable to tx. Pleasantly confused, denies pain. Vital Signs  Patient Currently in Pain: Denies   Orientation  Orientation  Overall Orientation Status: Impaired  Orientation Level: Disoriented to situation  Objective    ADL  Grooming: Minimal assistance(oral care, applying deodorant, and hand washing in stance at sink)  Additional Comments: Pt declined further ADLs. DIL reports pt is performing better today compared to yesterday.         Balance  Sitting Balance: Supervision  Standing Balance: Minimal assistance(CGA-min A)  Standing Balance  Time: ~8 min total  Activity: ambulation to/from bathroom, static stance for ADLs, transfers  Comment: primarily CGA in static stance with 1 instance of min A in stance at sink d/t R lean  Functional Mobility  Functional - Mobility Device: Other(hand held assist)  Activity: To/from bathroom; Other(~30 ft total in room)  Assist Level: Minimal assistance  Functional Mobility Comments: Pt with 2 instances of mild LOB with physical cue at hip to correct; HHA for safety for most ambulation with ~4 ft x2 completed w/o HHA  Toilet Transfers  Toilet - Technique: Ambulating  Equipment Used: Standard bedside commode(positioned at sink)  Toilet Transfer: Contact guard assistance  Bed mobility  Supine to Sit: Supervision  Scooting: Supervision  Comment: HOB elevated  Transfers  Sit to stand: Contact guard assistance  Stand to sit: Contact guard assistance  Transfer Comments: CGA d/t impulsivity during transfers; asked for prompts on this date prior to sitting demonstrating some carry-over from yesterday                       Cognition  Overall Cognitive Status: Exceptions  Arousal/Alertness: Appropriate responses to stimuli  Following Commands: Inconsistently follows commands; Follows one step commands with repetition  Attention Span: Difficulty dividing attention; Difficulty attending to directions; Attends with cues to redirect  Safety Judgement: Decreased awareness of need for assistance;Decreased awareness of need for safety  Problem Solving: Assistance required to implement solutions;Assistance required to generate solutions  Insights: Decreased awareness of deficits  Initiation: Requires cues for some  Sequencing: Requires cues for some                       LUE AROM (degrees)  LUE AROM : WFL  RUE AROM (degrees)  RUE AROM : WFL                 Plan   Plan  Times per week: 3-5  Times per day: Daily  Current Treatment Recommendations: Strengthening, Patient/Caregiver Education & Training, Equipment Evaluation, Education, & procurement, Balance Training, Functional Mobility Training, Endurance Training, Self-Care / ADL  G-Code     OutComes Score                                                  AM-PAC Score        AM-PAC Inpatient Daily Activity Raw Score: 16 (08/06/20 1422)  AM-PAC Inpatient ADL T-Scale Score : 35.96 (08/06/20 1422)  ADL Inpatient CMS 0-100% Score: 53.32 (08/06/20 1422)  ADL Inpatient CMS G-Code Modifier : CK (08/06/20 1422)    Goals  Short term goals  Time Frame for Short term goals: discharge  Short term goal 1: UB ADL SBA- min A 8/6  Short term goal 2: LB ADL CGA- continue goal 8/6  Short term goal 3: Fxl transfers SBA- CGA 8/6  Short term goal 4: Fxl mob SBA- min A 8/6  Long term goals  Time Frame for Long term goals : LTG=STG       Therapy Time   Individual Concurrent Group Co-treatment   Time In 6133         Time Out 1411         Minutes 24         Timed Code Treatment Minutes: 62048 Research Brad OTR/L [id number]

## 2020-08-06 NOTE — PROGRESS NOTES
Hospitalist Progress Note      PCP: Adolfo Beal MD    Date of Admission: 8/3/2020    Chief Complaint: Hallucinations    Hospital Course:   Patient doing well  No chest pain shortness of breath  No abdominal pain nausea vomiting  No headache        Medications:  Reviewed      Exam:    /74   Pulse 61   Temp 99.1 °F (37.3 °C) (Oral)   Resp 16   Ht 5' 6\" (1.676 m)   Wt 160 lb (72.6 kg)   SpO2 94%   BMI 25.82 kg/m²     General appearance: No apparent distress, appears stated age and cooperative. HEENT: Pupils equal, round, and reactive to light. Conjunctivae/corneas clear. Neck: Supple, with full range of motion. No jugular venous distention. Trachea midline. Respiratory:  Normal respiratory effort. Clear to auscultation, bilaterally without RALES/WHEEZES/Rhonchi. Cardiovascular: Regular rate and rhythm with normal S1/S2 without MURMURS, rubs or gallops. Abdomen: Soft, non-tender, non-distended with normal bowel sounds. Musculoskeletal: No clubbing, cyanosis or EDEMA bilaterally. Full range of motion without deformity. Skin: Skin color, texture, turgor normal.  No rashes or lesions. Neurologic:  Neurovascularly intact without any focal sensory/motor deficits.  Cranial nerves: II-XII intact, grossly non-focal.  Awake alert oriented to time place and person  Physical exam unchanged from 8/4      Labs:   Recent Labs     08/04/20  0540 08/05/20  0546 08/06/20  0528   WBC 11.5* 10.8 9.1   HGB 12.8* 12.3* 12.0*   HCT 39.5* 37.6* 36.5*    166 163     Recent Labs     08/04/20  0540 08/05/20  0546 08/06/20  0528    139 137   K 3.3* 3.8 3.9    107 103   CO2 24 25 23   BUN 22* 18 19   CREATININE 1.4* 1.3 1.0   CALCIUM 8.3 7.9* 7.7*   PHOS 2.7  --   --      Recent Labs     08/03/20  1715 08/04/20  0540 08/05/20  0546   AST 16 13* 12*   ALT 12 9* 8*   BILITOT 0.6 0.5 0.4   ALKPHOS 84 70 70     Recent Labs     08/05/20  0546   INR 1.01     Recent Labs     08/03/20  1715   TROPONINI B12 levels are normal  MRI of the brain showed some temporal lobe changes  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  Seen by neurology  Suspected complex partial seizures versus paraneoplastic syndrome which need to be further excluded  He had a lumbar puncture which showed some mild elevation in protein  Patient does not have any further symptoms  Neurology Jacinta plans to repeat the lumbar puncture with a large-volume tap and they will follow-up with CSF protein and other inflammatory markers in the paraneoplastic work-up as an outpatient  If his symptoms recur then he will also need a psychiatry consultation  Patient can be discharged medically stable and follow-up with me in an outpatient setting in 2 to 3 weeks with his family for further work-up which is indicated. This will include repeating MRI brain in 3 to 4 weeks with contrast.  If these abnormalities are postictal effect, most likely will be transient and improved with subsequent MRI brain. The patient does drive. He was advised not to drive until he is cleared from his physician.       Acute kidney injury on baseline of chronic kidney disease stage III  Baseline creatinine of 1.4  Atony resolved with IV fluids  Have discontinued lisinopril and Lasix on him    Type 2 diabetes mellitus  Lantus 8 units and sitagliptin    Hypertension  Continue amlodipine 2.5 mg      Hyperlipidemia  Continue statin  I spoke to . the patient explained all of the above to him also called his son and explained that            DVT Prophylaxis:  Sc heparin   Diet: DIET CARB CONTROL;  Code Status: Full Code      Dispo - once acute medical processes have resolved    Simi Smith MD

## 2020-08-07 NOTE — PROGRESS NOTES
Attempted to call [facility] at this time and the   says there is no answer and will have the RN return my call.

## 2020-08-07 NOTE — CARE COORDINATION
CYNTHIA was notified by Dianelys la RN that she sent the rapid Covid test but lab states have to make sure that the facility is ok with a rapid Covid result. Cynthia called lab and spoke to [facility] and she states that need to check and make sure that facility will accept a rapid [employer] Covid test.     CYNTHIA called and spoke to Mayi at [facility] and she is fine with a rapid test result. CYNTHIA called lab back and notified Janee that rapid test will be accepted. Pt is ready for discharge pending negative result.    Manisha Dejesus RN, BSN  [phone]

## 2020-08-07 NOTE — PROGRESS NOTES
Patient sitting up to chair at this time watching tv, does not appear to be in distress and denies needs at this time. Instructed patient to call if needs arise. Chair alarm engaged, call light within reach. Will monitor.

## 2020-08-07 NOTE — DISCHARGE SUMMARY
Physician Discharge Summary     Patient ID:  Maribel Borjas  [id number]  89 y.o.  1940    Admit date: 8/3/2020    Discharge date and time: No discharge date for patient encounter. Admitting Physician: Donis Suarez MD     Discharge Physician: Simi Smith MD    Admission Diagnoses: Hallucination [R44.3]  Hallucination [R44.3]  Acute kidney injury ([address] [N17.9]  Acute kidney injury ([address] [N17.9]    Discharge Diagnoses:   Acute encephalopathy with hallucination and psychosis. Diabetes, not controlled  Hypertension  Chronic cognitive impairment and underlying dementia  Abnormal MRI which could be seen in patient and postictal state, paraneoplastic and less likely inflammatory. CSF is not consistent with infectious etiology.   Will need further work-up outpatient    Admission Condition: good    Discharged Condition: good    Indication for Admission: hallucination     Hospital Course:   25-year-old gentleman admitted to the hospital with hallucinations     Hallucinations may be related to dehydration and acute kidney injury  CT head showed a remote lacunar infarct otherwise no abnormalities  No evidence of acute infection urinalysis negative and chest x-ray normal  I have reviewed his home medication list as well  Ammonia is normal  TSH and B12 levels are normal  MRI of the brain showed some temporal lobe changes  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  Seen by neurology  Suspected complex partial seizures versus paraneoplastic syndrome which need to be further excluded  He had a lumbar puncture which showed some mild elevation in protein however oligoclonal bands were negative and no malignant cells were noted on cytology  Patient does not have any further symptoms  Neurology Jacinta plans to repeat the lumbar puncture with a large-volume tap and they will follow-up with CSF protein and other inflammatory markers in the paraneoplastic work-up as an outpatient  If his symptoms recur then he will also need a psychiatry consultation  Patient can be discharged medically stable and follow-up with me in an outpatient setting in 2 to 3 weeks with his family for further work-up which is indicated.  This will include repeating MRI brain in 3 to 4 weeks with contrast.  If these abnormalities are postictal effect, most likely will be transient and improved with subsequent MRI brain. The patient does  drive. Sheri Bagley was advised not to drive until he is cleared from his physician.   He voiced understanding and is in agreement        Acute kidney injury on baseline of chronic kidney disease stage III  Baseline creatinine of 1.4   resolved with IV fluids  Have discontinued lisinopril and Lasix on him     Type 2 diabetes mellitus  Lantus 8 units and sitagliptin     Hypertension  Continue amlodipine 2.5 mg        Hyperlipidemia  Continue statin        Discharge Exam:  /67   Pulse 68   Temp 98.6 °F (37 °C) (Oral)   Resp 16   Ht 5' 6\" (1.676 m)   Wt 165 lb 8 oz (75.1 kg) Comment: bed zeroed  SpO2 95%   BMI 26.71 kg/m²   General appearance: alert, appears stated age and cooperative  Lungs: clear to auscultation bilaterally  Heart: regular rate and rhythm, S1, S2 normal, no murmur, click, rub or gallop  Abdomen: soft, non-tender; bowel sounds normal; no masses,  no organomegaly  Neurologic: Grossly normal    Disposition: TCC    In process/preliminary results:  Outstanding Order Results     Date and Time Order Name Status Description    8/5/2020 1500 Culture, CSF Preliminary     8/5/2020 1500 Myelin Basic Protein, CSF In process     8/5/2020 1500 HSV PCR Preliminary     8/5/2020 1500 Angiotensin Converting Enzyme, CSF In process     8/5/2020 4709 Anti-Neutrophilic Cytoplasmic Antibody In process     8/5/2020 1319 Electrophoresis Protein, Serum without Reflex to Immunofixation Preliminary     8/5/2020 0546 Methylmalonic Acid, Serum In process     8/3/2020 1715 Culture, Blood 1 Preliminary           Patient Instructions:   Current Discharge Medication List      CONTINUE these medications which have CHANGED    Details   insulin glargine (LANTUS;BASAGLAR) 100 UNIT/ML injection pen Inject 8 Units into the skin nightly  Qty: 5 pen, Refills: 3         CONTINUE these medications which have NOT CHANGED    Details   B-D ULTRAFINE III SHORT PEN 31G X 8 MM MISC USE TWICE DAILY AS DIRECTED  Qty: 100 each, Refills: 6    Comments: BD UF SHORT PEN NEEDLE [id number]      [employer] PAIN RELIEF EXTRA STRENGTH 500 MG tablet TAKE 1 TABLET BY MOUTH EVERY 6 HOURS AS NEEDED FOR PAIN  Qty: 100 tablet, Refills: 3    Associated Diagnoses: Generalized OA      vitamin D (ERGOCALCIFEROL) 1.25 MG (21782 UT) CAPS capsule Take 1 capsule by mouth once a week  Qty: 12 capsule, Refills: 1    Associated Diagnoses: Vitamin D deficiency      metoprolol tartrate (LOPRESSOR) 50 MG tablet Take 1 tablet by mouth 2 times daily  Qty: 180 tablet, Refills: 5    Associated Diagnoses: Essential hypertension; Coronary artery disease involving native coronary artery of native heart without angina pectoris      amLODIPine (NORVASC) 2.5 MG tablet Take 1 tablet by mouth daily  Qty: 90 tablet, Refills: 5    Associated Diagnoses: Essential hypertension      SITagliptin (JANUVIA) 50 MG tablet Take 1 tablet by mouth daily  Qty: 30 tablet, Refills: 6    Associated Diagnoses: Type 2 diabetes mellitus without complication, with long-term current use of insulin ([facility])      aspirin EC 81 MG EC tablet Take 1 tablet by mouth daily  Qty: 90 tablet, Refills: 11    Associated Diagnoses: Coronary artery disease involving native coronary artery of native heart without angina pectoris; Type 2 diabetes mellitus without complication, with long-term current use of insulin ([facility])      atorvastatin (LIPITOR) 80 MG tablet Take 1 tablet by mouth daily  Qty: 90 tablet, Refills: 6    Associated Diagnoses: Mixed hyperlipidemia; Coronary artery disease involving native coronary artery of native heart without angina pectoris;  Type 2 diabetes mellitus without complication, with long-term current use of insulin ([facility])      ferrous sulfate 325 (65 Fe) MG tablet Take 1 tablet by mouth daily (with breakfast)  Qty: 90 tablet, Refills: 3    Associated Diagnoses: Other iron deficiency anemia      latanoprost (XALATAN) 0.005 % ophthalmic solution Place 1 drop into the left eye nightly. brimonidine-timolol (COMBIGAN) 0.2-0.5 % ophthalmic solution Place 1 drop into both eyes daily.            STOP taking these medications       lisinopril (PRINIVIL;ZESTRIL) 40 MG tablet Comments:   Reason for Stopping:         furosemide (LASIX) 20 MG tablet Comments:   Reason for Stopping:             Activity: activity as tolerated  Diet: diabetic diet  Wound Care: none needed    Time spent 40 mins    Signed:  Trevor Love MD  8/7/2020  12:59 PM

## 2020-08-07 NOTE — CARE COORDINATION
CM spoke to pt then spoke to son on phone and both agree to [facility] who has accepted pt at discharge. Lea at [facility] [phone] states covid needed. CM ordered rapid covid, called lab for correct testing kit and updated pt on poc. Received call from [facility] and informed pt accepted at [facility]. CM also returned call to [facility] [place] and informed of acceptance at [facility].      Willy Millan RN, BSN  [phone]

## 2020-08-07 NOTE — PROGRESS NOTES
Linda Lainez  Neurology Follow-up  [facility] Neurology    Date of Service: 8/7/2020    Subjective:   CC: Follow up today regarding: Acute psychosis and delirium. Events noted. Chart and lab reviewed. The patient feels better today. Less frequent auditory hallucination but no suicidal ideation or thoughts. No weakness or numbness or tingling. No headache, chest pain, dysphagia and other review of system was unremarkable. ROS : A 10-12 system review obtained and updated today and is unremarkable except as mentioned  in my interval history. family history includes Cancer in his mother; Diabetes in his mother; Emphysema in his father; Heart Disease in his brother.     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    Type II or unspecified type diabetes mellitus without mention of complication, not stated as uncontrolled     Unspecified sleep apnea      Current Facility-Administered Medications   Medication Dose Route Frequency Provider Last Rate Last Dose    insulin glargine (LANTUS;BASAGLAR) injection pen 8 Units  8 Units Subcutaneous Nightly Sandhya Vázquez MD   8 Units at 08/06/20 2125    insulin lispro (1 Unit Dial) 0-6 Units  0-6 Units Subcutaneous TID WC Sandhya Vázquez MD   1 Units at 08/07/20 0852    insulin lispro (1 Unit Dial) 0-3 Units  0-3 Units Subcutaneous Nightly Sandhya Vázquez MD   2 Units at 08/06/20 2125    sodium chloride flush 0.9 % injection 10 mL  10 mL Intravenous 2 times per day Gabriel Anguiano MD   10 mL at 08/07/20 0846    sodium chloride flush 0.9 % injection 10 mL  10 mL Intravenous PRN Gabriel Anguiano MD        aspirin EC tablet 81 mg  81 mg Oral Daily Tanisha Evans MD   81 mg at 08/07/20 0844    atorvastatin (LIPITOR) tablet 80 mg  80 mg Oral Daily Ayesha Mcclain MD   80 mg at 08/07/20 0844    ferrous sulfate (IRON 325) tablet 325 mg  325 mg Oral Daily with breakfast Ayesha Mcclain MD   325 mg at 08/07/20 0844    latanoprost (XALATAN) 0.005 % ophthalmic solution 1 drop  1 drop Left Eye Nightly Ayesha Mcclain MD   1 drop at 08/06/20 2129    metoprolol tartrate (LOPRESSOR) tablet 50 mg  50 mg Oral BID Ayesha Mcclain MD   50 mg at 08/07/20 0844    glucose (GLUTOSE) 40 % oral gel 15 g  15 g Oral PRN Ayesha Mcclain MD        dextrose 50 % IV solution  12.5 g Intravenous PRN Ayesha Mcclain MD        glucagon (rDNA) injection 1 mg  1 mg Intramuscular PRN Ayesha Mcclain MD        dextrose 5 % solution  100 mL/hr Intravenous PRN Ayesha Mcclain MD        sodium chloride flush 0.9 % injection 10 mL  10 mL Intravenous 2 times per day Ayesha Mcclain MD   10 mL at 08/07/20 0846    sodium chloride flush 0.9 % injection 10 mL  10 mL Intravenous PRN Ayesha Mcclain MD        acetaminophen (TYLENOL) tablet 650 mg  650 mg Oral Q6H PRN Ayesha Mcclain MD   650 mg at 08/06/20 1724    Or    acetaminophen (TYLENOL) suppository 650 mg  650 mg Rectal Q6H PRN Ayesha Mcclain MD        polyethylene glycol (GLYCOLAX) packet 17 g  17 g Oral Daily PRN Ayesha Mcclain MD        promethazine (PHENERGAN) tablet 12.5 mg  12.5 mg Oral Q6H PRN Ayesha Mcclain MD        Or    ondansetron (ZOFRAN) injection 4 mg  4 mg Intravenous Q6H PRN Ayesha Mcclain MD        brimonidine (ALPHAGAN) 0.2 % ophthalmic solution 1 drop  1 drop Both Eyes Daily Ayesha Mcclain MD   1 drop at 08/07/20 0847    And    timolol (TIMOPTIC) 0.5 % ophthalmic solution 1 drop  1 drop Both Eyes Daily Ayesha Mcclain MD   1 drop at 08/07/20 0847    amLODIPine (NORVASC) tablet 5 mg  5 mg Oral Daily Trevor Love MD   5 mg at 08/07/20 0845     Allergies   Allergen Reactions    Pcn [Penicillins] Rash      reports that he quit smoking about 39 years ago.  His smoking use included cigarettes. He has a 50.00 pack-year smoking history. He has quit using smokeless tobacco. He reports that he does not drink alcohol or use drugs. Objective:  Exam:   Constitutional:   Vitals:    08/07/20 0400 08/07/20 0459 08/07/20 0613 08/07/20 0842   BP:  (!) 145/73  131/67   Pulse: 53 54 51 68   Resp:  18  16   Temp:  98 °F (36.7 °C)  98.6 °F (37 °C)   TempSrc:  Oral  Oral   SpO2:  95%  95%   Weight:  165 lb 8 oz (75.1 kg)     Height:         General appearance:  Normal development and appear in no acute distress. Eye: No icterus. Neck: supple  Cardiovascular:  No lower leg edema with good pulsation. Mental Status: No change  AAO x2  Poor immediate recall  Intact remote memory  Normal attention today  Language is fluent. Poor fund of knowledge. Cranial Nerves: No change  II: Pupils: equal, round, reactive to light  III,IV,VI: Extra Ocular Movements are intact. No nystagmus  V: Facial sensation is intact  VII: Facial strength and movements: intact and symmetric  IX: Palate elevation is symmetric  XI: Shoulder shrug is intact  XII: Tongue movements are normal  Musculoskeletal: 5/5 in all 4 extremities. Tone: Normal tone. Reflexes: Symmetric in both arms and legs. Coordination: no pronator drift, no dysmetria with FNF. Normal REM. Sensation: normal to all modalities in both arms and legs. Gait/Posture: steady gait  No change in exam today.       Data:  LABS:   Lab Results   Component Value Date     08/06/2020    K 3.9 08/06/2020     08/06/2020    CO2 23 08/06/2020    BUN 19 08/06/2020    CREATININE 1.0 08/06/2020    GFRAA >60 08/06/2020    GFRAA >60 05/28/2013    LABGLOM >60 08/06/2020    GLUCOSE 144 08/06/2020    PHOS 2.7 08/04/2020    MG 1.90 08/06/2020    CALCIUM 7.7 08/06/2020     Lab Results   Component Value Date    WBC 9.1 08/06/2020    RBC 4.22 08/06/2020    HGB 12.0 08/06/2020    HCT 36.5 08/06/2020    MCV 86.7 08/06/2020    RDW 13.5 08/06/2020     08/06/2020     Lab Results   Component Value Date    INR 1.01 08/05/2020    PROTIME 11.7 08/05/2020       Neuroimaging was independently reviewed by me and discussed results with the patient  I reviewed blood testing and other test results and discussed results with the patient      Impression: No change  Acute encephalopathy with hallucination and psychosis. Diabetes, not controlled  Hypertension  Chronic cognitive impairment and underlying dementia  Abnormal MRI which could be seen in patient and postictal state, paraneoplastic and less likely inflammatory. CSF is not consistent with infectious etiology. Will need further work-up outpatient    Recommendation:  Continue current supportive care  Aspirin  Statin  Insulin sliding scale  Blood sugar monitor  PT and OT  No driving  Can be discharged when medically stable and follow-up with me outpatient in 2 to 3 weeks to complete his work-up which could include repeating LP and MRI brain to rule out for other paraneoplastic process. If symptoms recur or getting worse, I would consider starting AED  No further recommendation  We will sign off. Timothy Tan MD   [phone]      This dictation was generated by voice recognition computer software. Although all attempts are made to edit the dictation for accuracy, there may be errors in the transcription that are not intended.

## 2020-08-08 PROBLEM — N39.0 COMPLICATED UTI (URINARY TRACT INFECTION): Status: ACTIVE | Noted: 2020-01-01

## 2020-08-08 NOTE — ED NOTES
Catheter placed for urine sample, per Dr. Ade Waters. Pt tolerated well. Blood noted in tubing and then flow stopped. Catheter was flushed several time to help urine return. Pt denies discomfort.   Urine sent to lab     Ariella Wells RN  08/08/20 0129

## 2020-08-08 NOTE — PROGRESS NOTES
Received to [id number] from ER per stretcher, lateral transfer to bed, weight obtained. Temp 101 axillary, other VSS, SR w/1st degree AV block on telemetry. Awakens to name, confused conversation. Snyder draining very dark bloody urine, bolus still infusing from ER.

## 2020-08-08 NOTE — ED NOTES
Pt is confused, unable to give history or explain where he is hurting. Large hematoma to right wrist area. Pt also actively bleeding from penis. Attempted to hold a urinal for patient and he tried to urinate. Only a small amount of dark red blood was obtained. Sent to lab for specimen. Dr. Christianson Ship aware. Pt to CT at this time. Place don 2L of 02 for comfort.   Pt with tachypnea at this time     Catherne Leventhal, RN  08/08/20 0937

## 2020-08-08 NOTE — ED PROVIDER NOTES
Primary Care Physician: Lyndsay Whitaker MD   Attending Physician: Aguila Norton MD     History   Chief Complaint   Patient presents with    Fall     pt d/c from inpt unit at 200 today. pt is coming in [facility] via [address]. Pt fell out of bed is bleeding from penis where his ruiz catheter was . nursing at facility states he hasn't been acting right since he arrived today 08/07/2020 at 1900        HPI   [employer] is a [de-identified] y.o. male with history of diabetes 2, CVA, essential hypertension, hyperlipidemia, CAD, who recently admitted for hallucination and discharge today to his skilled nursing facility. But returns this evening after a fall and more altered and confused than usual.  Sent back to the emergency room by the nursing facility for further evaluation. Patient is altered confused and cannot provide history. Because of his mental change he is unable to provide history. 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    Type II or unspecified type diabetes mellitus without mention of complication, not stated as uncontrolled     Unspecified sleep apnea         Past Surgical History:   Procedure Laterality Date    COLONOSCOPY      EYE SURGERY      INTUBATION  8/8/2020         VASECTOMY  1974        Family History   Problem Relation Age of Onset    Diabetes Mother     Cancer Mother     Emphysema Father     Heart Disease Brother         Social History     Socioeconomic History    Marital status:       Spouse name: None    Number of children: None    Years of education: None    Highest education level: None   Occupational History    None   Social Needs    Financial resource strain: None    Food insecurity     Worry: None     Inability: None    Transportation needs     Medical: None     Non-medical: None   Tobacco Use    Smoking status: Former Smoker     Packs/day: 2.00     Years: 25.00     Pack years: 50.00     Types: Cigarettes     Last attempt to quit: 1981     Years since quittin.6    Smokeless tobacco: Former User   Substance and Sexual Activity    Alcohol use: No    Drug use: No    Sexual activity: Never   Lifestyle    Physical activity     Days per week: None     Minutes per session: None    Stress: None   Relationships    Social connections     Talks on phone: None     Gets together: None     Attends Episcopal service: None     Active member of club or organization: None     Attends meetings of clubs or organizations: None     Relationship status: None    Intimate partner violence     Fear of current or ex partner: None     Emotionally abused: None     Physically abused: None     Forced sexual activity: None   Other Topics Concern    None   Social History Narrative    None        Review of Systems   10 total systems reviewed but unable to obtain secondary to medical condition  Physical Exam   /75   Pulse 100   Temp 97.9 °F (36.6 °C) (Axillary)   Resp (!) 32   Ht 5' 6\" (1.676 m)   Wt 184 lb 6.4 oz (83.6 kg)   SpO2 97%   BMI 29.76 kg/m²      CONSTITUTIONAL: Confused and restless  HEAD: atraumatic, normocephalic   EYES: PERRL, No injection, discharge or scleral icterus. ENT: Moist mucous membranes. NECK: Normal ROM, NO LAD   CARDIOVASCULAR: Regular rate and rhythm. No murmurs or gallop. PULMONARY/CHEST: Airway patent. No retractions. Breath sounds clear with good air entry bilaterally. ABDOMEN: Soft, Non-distended and non-tender, without guarding or rebound. SKIN: Acyanotic, warm, dry   MUSCULOSKELETAL: No swelling, tenderness or deformity   NEUROLOGICAL: Awake and alert. Pulses intact. Grossly nonfocal   Nursing note and vitals reviewed.      ED Course & Medical Decision Making Medications   latanoprost (XALATAN) 0.005 % ophthalmic solution 1 drop (1 drop Left Eye Given 8/8/20 2104)   dextrose 50 % IV solution (has no administration in time range)   dextrose 5 % solution (has no administration in time range)   sodium chloride flush 0.9 % injection 10 mL (10 mLs Intravenous Given 8/8/20 2105)   sodium chloride flush 0.9 % injection 10 mL (has no administration in time range)   acetaminophen (TYLENOL) tablet 650 mg (650 mg Oral Given 8/8/20 0426)     Or   acetaminophen (TYLENOL) suppository 650 mg ( Rectal See Alternative 8/8/20 0426)   brimonidine (ALPHAGAN) 0.2 % ophthalmic solution 1 drop (1 drop Both Eyes Given 8/8/20 0836)     And   timolol (TIMOPTIC) 0.5 % ophthalmic solution 1 drop (1 drop Both Eyes Given 8/8/20 0836)   norepinephrine (LEVOPHED) 16 mg in dextrose 5% 250 mL infusion (35 mcg/min Intravenous New Bag 8/9/20 0211)   lidocaine PF 1 % injection 5 mL ( Intradermal Canceled Entry 8/8/20 1517)   sodium chloride flush 0.9 % injection 10 mL (10 mLs Intravenous Given 8/8/20 2058)   sodium chloride flush 0.9 % injection 10 mL (has no administration in time range)   hydrocortisone sodium succinate PF (SOLU-CORTEF) injection 50 mg (50 mg Intravenous Given 8/9/20 0601)   chlorhexidine (PERIDEX) 0.12 % solution 15 mL (15 mLs Mouth/Throat Given 8/8/20 2104)   atorvastatin (LIPITOR) tablet 80 mg (0 mg Per NG tube Held 8/8/20 2058)   insulin lispro (1 Unit Dial) 0-12 Units (6 Units Subcutaneous Given 8/9/20 0623)   propofol injection (30 mcg/kg/min × 77.5 kg Intravenous New Bag 8/8/20 1613)   fentaNYL (SUBLIMAZE) 1,000 mcg in sodium chloride 0.9 % 100 mL infusion (0.5 mcg/kg/hr × 77.5 kg Intravenous New Bag 8/8/20 1635)   pantoprazole (PROTONIX) injection 40 mg (has no administration in time range)   meropenem (MERREM) 1 g in sodium chloride 0.9 % 100 mL IVPB (mini-bag) (1 g Intravenous New Bag 8/9/20 0601)   vancomycin (VANCOCIN) 1,500 mg in dextrose 5 % 250 mL IVPB (0 mg Intravenous Stopped 8/8/20 2020)   vasopressin 20 Units in dextrose 5 % 100 mL infusion (0 Units/min Intravenous Held 8/8/20 2057)   sodium bicarbonate 150 mEq in dextrose 5 % 1,000 mL infusion ( Intravenous New Bag 8/9/20 0211)   0.9 % sodium chloride bolus (0 mLs Intravenous Stopped 8/8/20 0134)   cefepime (MAXIPIME) 2 g IVPB minibag (0 g Intravenous Stopped 8/8/20 0252)   0.9 % sodium chloride bolus (0 mLs Intravenous Stopped 8/8/20 0434)   potassium phosphate 30 mmol in dextrose 5 % 250 mL IVPB (0 mmol Intravenous Stopped 8/8/20 1114)   0.9 % sodium chloride bolus (0 mLs Intravenous Stopped 8/8/20 0859)   0.9 % sodium chloride bolus (0 mLs Intravenous Stopped 8/8/20 1113)   magnesium sulfate 1 g in dextrose 5% 100 mL IVPB (0 g Intravenous Stopped 8/8/20 1533)   potassium phosphate 20 mmol in dextrose 5 % 250 mL IVPB (0 mmol Intravenous Stopped 8/8/20 2059)   etomidate (AMIDATE) 2 MG/ML injection (  Given 8/8/20 1345)   succinylcholine (ANECTINE) 200 MG/10ML injection (  Given 8/8/20 1615)   EPINEPHrine PF 1 MG/ML injection (1 mg Intravenous Given 8/8/20 1322)   EPINEPHrine 1 MG/10ML injection (1 mg Intravenous Canceled Entry 8/8/20 1904)      Labs Reviewed   CULTURE, BLOOD 1 - Abnormal; Notable for the following components:       Result Value    Blood Culture, Routine   (*)     Value: Gram stain Aerobic bottle:  Gram negative rods  Information to follow  Gram stain Anaerobic bottle:  Gram negative rods  Information to follow      All other components within normal limits    Narrative:     ORDER#: [id number]                          ORDERED BY: SEBAS CARRIZALES  SOURCE: Blood Antecubital-Lef              COLLECTED:  08/08/20 00:21  ANTIBIOTICS AT RODY.:                      RECEIVED :  08/08/20 08:48  If child <=2 yrs old please draw pediatric bottle. ~Blood Culture #1  Performed at:  [place] [address]   Phone [phone]   CULTURE, BLOOD 2 - Abnormal; - Abnormal; Notable for the following components:    Protime 14.2 (*)     INR 1.22 (*)     All other components within normal limits    Narrative:     Performed at:  [facility] [address]   Phone [phone]   APTT - Abnormal; Notable for the following components:    aPTT 39.5 (*)     All other components within normal limits    Narrative:     Performed at:  [facility] [address]   Phone [phone]   LACTATE, SEPSIS - Abnormal; Notable for the following components:    Lactic Acid, Sepsis 7.6 (*)     All other components within normal limits    Narrative:     Ruth Garcia  [facility] tel. [phone],  Chemistry results called to and read back by Haroldo Umana, 08/08/2020  01:24, by [facility]  Performed at:  [facility] [address]   Phone [phone]   PROCALCITONIN - Abnormal; Notable for the following components:    Procalcitonin 12.49 (*)     All other components within normal limits    Narrative:     Performed at:  [facility] [address]   Phone [phone]   URINE RT REFLEX TO CULTURE - Abnormal; Notable for the following components:    Color, UA RED (*)     Clarity, UA TURBID (*)     Glucose, Ur 500 (*)     Bilirubin Urine SMALL (*)     Ketones, Urine TRACE (*)     Blood, Urine LARGE (*)     Protein, UA >=300 (*)     Nitrite, Urine POSITIVE (*)     Leukocyte Esterase, Urine TRACE (*)     All other components within normal limits    Narrative:     Performed at:  [facility] [address]   Phone [phone]   MICROSCOPIC URINALYSIS - Abnormal; Notable for the following components:    RBC, UA >100 (*)     WBC, UA 29 (*)     All other components within normal limits    Narrative:     Performed at:  [facility]) [facility]  [address] [facility], [facility] [address]   Phone [phone]   BLOOD GAS, VENOUS - Abnormal; Notable for the following components:    pH, Paul 7.319 (*)     pCO2, Paul 33.0 (*)     pO2, Paul 133.0 (*)     HCO3, Venous 16.9 (*)     Base Excess, Paul -8.2 (*)     Carboxyhemoglobin 2.4 (*)     All other components within normal limits    Narrative:     Performed at:  [facility] [address] [facility]   Phone [phone]   COMPREHENSIVE METABOLIC PANEL - Abnormal; Notable for the following components:    Chloride 114 (*)     CO2 18 (*)     Glucose 112 (*)     BUN 35 (*)     CREATININE 1.9 (*)     GFR Non- 34 (*)     GFR  41 (*)     Calcium 7.1 (*)     Total Protein 4.8 (*)     Alb 2.4 (*)     Albumin/Globulin Ratio 1.0 (*)     Total Bilirubin 1.1 (*)     ALT 56 (*)      (*)     All other components within normal limits    Narrative:     Performed at:  [facility] [address], [place], [facility] [place]   Phone [phone]   MAGNESIUM - Abnormal; Notable for the following components:    Magnesium 1.50 (*)     All other components within normal limits    Narrative:     Performed at:  [facility] [address], [place], [address]   Phone [phone]   PHOSPHORUS - Abnormal; Notable for the following components:    Phosphorus 0.5 (*)     All other components within normal limits    Narrative:     [facility]  [facility] AT [place] tel. [phone],  Chemistry results called to and read back by [facility], 08/08/2020 05:56,  by [facility]  Performed at:  [facility] [address] [facility], [address]   Phone [phone]   LACTIC ACID, PLASMA - Abnormal; Notable for the following components:    Lactic Acid 3.5 (*)     All other components within normal limits    Narrative:     Performed at:  [facility]  [address] [facility],  [place], [address]   Phone [phone]   LACTIC ACID, PLASMA - Abnormal; Notable for the following components:    Lactic Acid 6.5 (*)     All other components within normal limits    Narrative:     Danielle Leiva  [facility] tel. [phone],  Chemistry results called to and read back by Mauri Bernheim, 08/08/2020  02:57, by [facility]  Performed at:  [facility]  [address] [facility],  [place], [address]   Phone [phone]   [place], BLOOD - Abnormal; Notable for the following components:    Hemoglobin 11.2 (*)     Hematocrit 34.6 (*)     All other components within normal limits    Narrative:     Collection has been rescheduled by [facility] at 08/08/2020 09:25 Reason: Add   on  Performed at:  [facility]  [address] [facility],  [place], [address]   Phone [phone]   BLOOD GAS, ARTERIAL - Abnormal; Notable for the following components:    pH, Arterial 7.150 (*)     pO2, Arterial 72.6 (*)     HCO3, Arterial 14.1 (*)     Base Excess, Arterial -14.1 (*)     Hemoglobin, Art, Extended 12.8 (*)     All other components within normal limits    Narrative:     Danielle Leiva  [facility] tel. [phone],  Chemistry results called to and read back by Mary Jo Olivera RN, 08/08/2020  20:05, by [facility] [place] results called to and read back by Mary Jo Olivera RN, 08/08/2020  18:55, by Anjana Mariano  Performed at:  [facility]  [address] [facility],  [place], [address]   Phone [phone]   BASIC METABOLIC PANEL - Abnormal; Notable for the following components:    Sodium 133 (*)     Potassium 6.7 (*)     CO2 14 (*)     Glucose 308 (*)     BUN 45 (*)     CREATININE 2.8 (*)     GFR Non- 22 (*)     GFR  26 (*)     Calcium 6.9 (*)     All other components within normal limits    Narrative:     Danielle Leiva  [facility] AT [place] tel. [phone],  Chemistry results called to and read back by CINTHIA CARDONA [facility] A[place]ANDRIA Clements, 08/09/2020  00:51, by [facility]  Performed at:  [facility] [address] [facility], [address] [facility]   Phone [phone]   LACTIC ACID, PLASMA - Abnormal; Notable for the following components:    Lactic Acid 3.7 (*)     All other components within normal limits    Narrative:     Performed at:  [facility] [address] [facility], [address] [facility]   Phone [phone]   CK - Abnormal; Notable for the following components:     Total  (*)     All other components within normal limits    Narrative:     [facility] AT [place] tel. [phone],  Chemistry results called to and read back by Eldon Thomas, 08/09/2020  00:51, by [facility]  Performed at:  [facility] [address], [place], [facility] [email or web address]   Phone [phone]   PHOSPHORUS - Abnormal; Notable for the following components:    Phosphorus 8.2 (*)     All other components within normal limits    Narrative:     [facility] AT [place] tel. [phone],  Chemistry results called to and read back by Eldon Thomas, 08/09/2020  00:51, by [facility]  Performed at:  [facility] [address] [facility], [facility] [email or web address]   Phone [phone]   POCT GLUCOSE - Abnormal; Notable for the following components:    POC Glucose 122 (*)     All other components within normal limits    Narrative:     Performed at:  [facility] [address] [facility], [email or web address]   Phone [phone]   POCT GLUCOSE - Abnormal; Notable for the following components:    POC Glucose 139 (*)     All other components within normal limits    Narrative:     Performed at:  [facility] [address] [facility],  [place], [address]   Phone [phone]   POCT ARTERIAL - Abnormal; Notable for the following components:    POC Glucose 191 (*)     Calcium, Ion 1.00 (*)     pH, Arterial 7.069 (*)     pCO2, Arterial 52.2 (*)     pO2, Arterial 68.6 (*)     HCO3, Arterial 15.1 (*)     Base Excess, Arterial -15 (*)     O2 Sat, Arterial 84 (*)     Lactate 3.72 (*)     POC Hematocrit 34.0 (*)     Hemoglobin 11.6 (*)     All other components within normal limits    Narrative:     Performed at:  [facility]  [address] [facility], [address]   Phone [phone]   POCT GLUCOSE - Abnormal; Notable for the following components:    POC Glucose 181 (*)     All other components within normal limits    Narrative:     Performed at:  [facility]  [address] [facility], [address]   Phone [phone]   POCT GLUCOSE - Abnormal; Notable for the following components:    POC Glucose 247 (*)     All other components within normal limits    Narrative:     Performed at:  [facility]  [address] [facility], [address]   Phone [phone]   POCT GLUCOSE - Abnormal; Notable for the following components:    POC Glucose 289 (*)     All other components within normal limits    Narrative:     Performed at:  [facility]  [address] [facility], [address]   Phone [phone]   POCT GLUCOSE - Abnormal; Notable for the following components:    POC Glucose 287 (*)     All other components within normal limits    Narrative:     Performed at:  [facility]  [address], [address]   Phone [phone]   CULTURE, BLOOD, PCR ID PANEL RESULTS REPORT    Narrative:     Magaly Pride  [facility] AT [place] tel. [phone],  Microbiology results called to and read back by Emanuel Stack. , 08/08/2020  17:09, by [facility]  Microbiology results called to and read back by Olamide Dyer RN, 08/08/2020 17:08,  by [facility]  Referred out by:  [facility]  [address] [place], [place] [address]   Phone [phone]   CULTURE, URINE   MRSA DNA PROBE, NASAL   CULTURE, RESPIRATORY SPECIMEN REJECTION    Narrative:     CALL  [facility] tel. [phone],  Rejected Test Name/Called to: Masood Gold RN/ER, 08/08/2020 01:10, by Miller Rodríguez  Performed at:  [facility] [address],  [facility], [address]   Phone [phone]    Narrative:     Referred out by:  [facility]  [address] [facility], [place] [address]   Phone [phone]   BLOOD GAS, ARTERIAL   LACTIC ACID, PLASMA   LACTIC ACID, PLASMA   CREATININE, RANDOM URINE   SODIUM, URINE, RANDOM   COMPREHENSIVE METABOLIC PANEL   MAGNESIUM   PHOSPHORUS   CBC WITH AUTO DIFFERENTIAL   BLOOD GAS, ARTERIAL   LACTIC ACID, PLASMA   TRIGLYCERIDES   POCT GLUCOSE   POCT GLUCOSE   POCT GLUCOSE   POCT GLUCOSE   POCT GLUCOSE   POCT GLUCOSE   POCT GLUCOSE      XR CHEST PORTABLE   Final Result   Endotracheal tube in good position. New right greater than left airspace disease over the past 2 hours consistent   with developing edema. Superimposed pneumonia is considered. US RENAL COMPLETE   Final Result   1. No acute abnormality. XR CHEST PORTABLE   Preliminary Result   1. Slight interval progression of mild right basilar atelectasis. 2. Stable mild elevation of the right hemidiaphragm. CT Cervical Spine WO Contrast   Final Result   No acute abnormality of the cervical spine. CT Head WO Contrast   Final Result   No CT evidence of acute infarct. Findings similar in comparison to the recent brain MRI and CT which   demonstrate white matter edema in the left temporal lobe and insula. Differential diagnosis includes herpes encephalitis, limbic encephalitis, low   grade neoplasm. XR CHEST PORTABLE   Final Result   No acute finding or significant change.          XR CHEST PORTABLE    (Results Pending)      Ct Head Wo Contrast Result Date: 8/3/2020  EXAMINATION: CT OF THE HEAD WITHOUT CONTRAST  8/3/2020 4:59 pm TECHNIQUE: CT of the head was performed without the administration of intravenous contrast. Dose modulation, iterative reconstruction, and/or weight based adjustment of the mA/kV was utilized to reduce the radiation dose to as low as reasonably achievable. COMPARISON: None. HISTORY: ORDERING SYSTEM PROVIDED HISTORY: ams TECHNOLOGIST PROVIDED HISTORY: Reason for exam:->ams Has a \"code stroke\" or \"stroke alert\" been called? ->No Reason for Exam: AMS. Acuity: Acute Type of Exam: Initial FINDINGS: BRAIN/VENTRICLES: There is no acute intracranial hemorrhage, mass effect, or midline shift. No abnormal extra-axial fluid collection. The gray-white differentiation is maintained without evidence of an acute infarct. Focal hypodensity involving the left basal ganglia and right caudate head, likely correlating with remote lacunar infarct. There is diffuse parenchymal volume loss, likely age related. Small focus of CSF density anterior to the left temporal horn likely representing a small arachnoid cyst. ORBITS: The visualized portion of the orbits demonstrate no acute abnormality. SINUSES: The visualized paranasal sinuses and mastoid air cells demonstrate no acute abnormality. SOFT TISSUES/SKULL:  No acute abnormality of the visualized skull or soft tissues. 1. No acute intracranial abnormality. 2. Findings compatible with remote lacunar infarcts involving the left basal ganglia and right caudate head. Xr Chest Portable Result Date: 8/3/2020  EXAMINATION: ONE XRAY VIEW OF THE CHEST 8/3/2020 5:18 pm COMPARISON: Chest radiograph performed 05/11/2012. HISTORY: ORDERING SYSTEM PROVIDED HISTORY: SOB TECHNOLOGIST PROVIDED HISTORY: Reason for exam:->SOB Reason for Exam: ALTERED MENTAL STATUS. FORMER SMOKER. HISTORY OF DIABETES, CABG, HTN AND A-FIB. Acuity: Acute Type of Exam: Initial FINDINGS: There is no acute consolidation or effusion. There is a stable left lung granuloma. There is no pneumothorax. The mediastinal structures are unremarkable. The upper abdomen is unremarkable. The extrathoracic soft tissues are unremarkable. There is no acute osseous abnormality. No acute cardiopulmonary process. Mri Brain Wo Contrast Result Date: 8/4/2020  EXAMINATION: MRI OF THE BRAIN WITHOUT CONTRAST  8/4/2020 6:27 pm TECHNIQUE: Multiplanar multisequence MRI of the brain was performed without the administration of intravenous contrast. COMPARISON: None. HISTORY: ORDERING SYSTEM PROVIDED HISTORY: hallucinations TECHNOLOGIST PROVIDED HISTORY: Reason for exam:->hallucinations Reason for Exam: Hallucinations 2 days. History of high bp and diabetes. No history of cancer. Acuity: Acute FINDINGS: INTRACRANIAL STRUCTURES/VENTRICLES: There is no diffusion restriction to suggest acute infarct. There is abnormal confluent mildly expansile T2/FLAIR hyperintensity predominantly involving the anterior left temporal lobe extending into the right uncus and hippocampus, posteroinferior frontal lobes, and also likely involving the right uncus and right hippocampus. Left choroid fissure cyst is incidentally noted. There are a few scattered punctate foci of T2/FLAIR hyperintensity in the cerebral white matter, nonspecific but generally ascribed to sequela of chronic microvascular ischemia. No mass effect or midline shift. No evidence of an acute intracranial hemorrhage. The ventricles and sulci are normal in size and configuration. The sellar/suprasellar regions appear unremarkable. The normal signal voids within the major intracranial vessels appear maintained. ORBITS: The visualized portion of the orbits demonstrate no acute abnormality. SINUSES: The visualized paranasal sinuses and mastoid air cells are well aerated. BONES/SOFT TISSUES: The bone marrow signal intensity appears normal. The soft tissues demonstrate no acute abnormality.      1. 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 Status epilepticus or infiltrative neoplasm are in the differential diagnosis but thought less likely. 2. No evidence of acute infarct. 3. Minimal chronic white matter microvascular ischemic changes. Fl Lumbar Puncture Diag Result Date: 8/5/2020  EXAMINATION: FLUOROSCOPIC GUIDED LUMBAR PUNCTURE 8/5/2020 2:42 pm HISTORY: ORDERING SYSTEM PROVIDED HISTORY: meningitis TECHNOLOGIST PROVIDED HISTORY: Reason for exam:->meningitis Reason for Exam: Dx: Meningitis Acuity: Unknown Type of Exam: Unknown FLUOROSCOPY DOSE AND TYPE OR TIME AND EXPOSURES: 48 seconds fluoroscopy time was utilized for the study a single exposure obtained. PROCEDURE: :  Karolina Pendleton. Waldemar Shannon MD Informed consent was obtained after the risks and benefits of the procedure were discussed with the patient and all questions were answered fully. [place] protocol was observed and a standard timeout was performed. The patient was positioned prone and the back was prepped and draped in the normal sterile fashion. 1% lidocaine was used for local anesthesia. The subarachnoid space was accessed with a 20-gauge 3.5 \"spinal needle at the L3-L4 level. Free flow of clear CSF was noted. Approximately 12 ml of CSF was removed and sent for analysis. The stylet was reinserted, spinal needle was removed and brief pressure was applied at the puncture site. There were no immediate complications and the patient tolerated the procedure well. Successful fluoroscopic-guided lumbar puncture. EKG INTERPRETATION:  EKG by my preliminary interpretation shows notes tach with a rate of 117, normal axis, normal intervals, with no ST changes indicative of ischemia at this time. PROCEDURES:   Procedures    ASSESSMENT AND PLAN:  Buck Saha is a [de-identified] y.o. male resident of skilled nursing facility presenting this morning confused more than usual and after a fall.   Exam patient is confused restless but hemodynamic stable and afebrile. Given his presentation I was concerned for infection and I did obtain labs as well as imaging of his head. Imaging studies showed no abnormal findings on the CT head and C-spine. X ray Of the chest was also unremarkable. However, labs show significantly elevated procalcitonin, and lactate which is new. He with greater than 29 WBCs. At this point I believe that the cause of his symptoms are probably secondary to UTI causing acute encephalopathy. Blood cultures and COVID-19 were also sent. Nonetheless patient was started on antibiotics and admitted to the hospitalist service for further treatment. ClINICAL IMPRESSION:  1. Septicemia ([address]    2. Fall, initial encounter    3. Altered mental status, unspecified altered mental status type          DISPOSITION    Hospitalist admit  Aneudy Leon MD (electronically signed)  8/9/2020  _________________________________________________________________________________________  Amount and/or Complexity of Data Reviewed:  Clinical lab tests: ordered and reviewed   Tests in the radiology section of CPT®: ordered and reviewed   Tests in the medicine section of CPT®: ordered and reviewed   Decide to obtain previous medical records or to obtain history from someone other than the patient: yes  Obtain history from someone other than the patient: yes  Review and summarize past medical records:yes  I looked up the patient in our electronic medical record:yes  Discuss the patient with other providers:yes  Independent visualization of images, tracings, or specimens:yes  Risk of Complications, Morbidity, and/or Mortality:Moderate  Presenting problems: moderate  Management options: moderate     Critical Care Attestation   Total critical care time: 35 minutes minimum   Critical care time does not include separately billable procedures and treating other patients.    Critical care was necessary to treat or prevent imminent or life-threatening deterioration of the following conditions: Sepsis with acute encephalopathy and patient treated urgently in the ED with antibiotics. Case discussed with consultants.       _________________________________________________________________________________________  This record is transcribed utilizing voice recognition technology. There are inherent limitations in this technology. In addition, there may be limitations in editing of this report. If there are any discrepancies, please contact me directly.         Angie Wu MD  08/09/20 [id number]

## 2020-08-08 NOTE — PROCEDURES
PICC line education:    -Risks  -Benefits  -Alternatives  -Procedure    Discussed the above with patient, verbalized understanding, answered all questions. Provided with information on PICC care to review. PICC tip verified via [employer] (Ok to use). Reported off to patient's  Nurse . Sushil Flynn

## 2020-08-08 NOTE — PROGRESS NOTES
Tylenol administered whole in applesauce for fever, bolus completed, NS hung @maintenance rate of 125 ml/hr.

## 2020-08-08 NOTE — H&P
Hospital Medicine History and Physical    8/8/2020    Date of Admission: 8/8/2020    Date of Service: Pt seen/examined on 8/8/2020 and admitted to inpatient. Assessment/plan:  1. Sepsis secondary to UTI. Blood cultures pending. Procalcitonin level elevated. Continue cefepime, fluid bolus, then maintenance. Follow urine culture result. 2. Complicated urinary tract infection. Follow cultures as noted above. Continue antibiotics. Urology consulted, especially with hematuria. 3. Acute metabolic encephalopathy. Could be secondary to underlying infectious process. Treat underlying infection. Monitor closely for improvement. 4. Traumatic hematuria. Urology has been consulted to assist with evaluation. Check renal ultrasound. 5. Lactic acidosis. Likely secondary to sepsis as noted above. Continue intravenous fluid, antibiotics, follow culture. Trend lactic acid level. 6. Acute kidney injury. Most recent creatinine 1.0; presents with creatinine 1.8. Avoid nephrotoxic medications. Continue intravenous fluid. 7. Other comorbidities: History of diabetes type 2, CVA, essential hypertension, hyperlipidemia. Activities: Up with assist  Prophylaxis: SCD  Code status: Full code    ==========================================================  Chief complaint:  Chief Complaint   Patient presents with    Fall     pt d/c from inpt unit at 200 today. pt is coming in from [facility]. Pt fell out of bed is bleeding from penis where his ruiz catheter was . nursing at facility states he hasn't been acting right since he arrived today 08/07/2020 at 1900         History of Presenting Illness:   This is a pleasant [de-identified] y.o. male history of diabetes type 2, CVA, essential hypertension, hyperlipidemia, CAD, who was recently discharged from hospital on 8/7/2020 following admission for hallucination, had extensive work-up with no significant etiology noted (suspected to have had some seizure with postictal symptoms). He had unremarkable spinal fluid analysis as well. He was discharged to SNF, where he was sent back to the emergency room for evaluation for increasing confusion. It was also reported that patient fell at the nursing home, sustained a large hematoma to right hand. On arrival in the emergency room, he was also noted to have hematuria, likely from traumatic pull of his Snyder catheter. Patient appears to be confused and unable to provide any relevant information. His creatinine is elevated at 1.8, compared to baseline 1.0 prior to discharge. He does have elevated lactic acid level of 7.3. Urinalysis is abnormal.  Patient is being admitted for management of suspected urinary tract infection, sepsis secondary to UTI. Past Medical History:      Diagnosis Date    A-fib 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  Authorizing Provider   insulin glargine (LANTUS;BASAGLAR) 100 UNIT/ML injection pen Inject 8 Units into the skin nightly 8/6/20  Yes Adeel Vences MD   [employer] PAIN RELIEF EXTRA STRENGTH 500 MG tablet TAKE 1 TABLET BY MOUTH EVERY 6 HOURS AS NEEDED FOR PAIN 2/21/20  Yes Navjot Finley MD   vitamin D (ERGOCALCIFEROL) 1.25 MG (95871 UT) CAPS capsule Take 1 capsule by mouth once a week 1/16/20  Yes Navjot Finley MD   metoprolol tartrate (LOPRESSOR) 50 MG tablet Take 1 tablet by mouth 2 times daily 1/16/20  Yes Lico Wolf MD   amLODIPine (NORVASC) 2.5 MG tablet Take 1 tablet by mouth daily 1/16/20  Yes Lico Wolf MD   SITagliptin (JANUVIA) 50 MG tablet Take 1 tablet by mouth daily 1/16/20  Yes Lico Wolf MD   aspirin EC 81 MG EC tablet Take 1 tablet by mouth daily 1/16/20 1/15/21 Yes Lico Wolf MD   atorvastatin (LIPITOR) 80 MG tablet Take 1 tablet by mouth daily 1/16/20  Yes Lico Wolf MD   ferrous sulfate 325 (65 Fe) MG tablet Take 1 tablet by mouth daily (with breakfast) 1/16/20  Yes Lico Wolf MD   latanoprost (XALATAN) 0.005 % ophthalmic solution Place 1 drop into the left eye nightly. Yes Historical Provider, MD   brimonidine-timolol (COMBIGAN) 0.2-0.5 % ophthalmic solution Place 1 drop into both eyes daily. Yes Historical Provider, MD   B-D ULTRAFINE III SHORT PEN 31G X 8 MM MISC USE TWICE DAILY AS DIRECTED 6/3/20   Lico Wolf MD       Allergy(ies):  Pcn [penicillins]    Social History:  TOBACCO:  reports that he quit smoking about 39 years ago. His smoking use included cigarettes. He has a 50.00 pack-year smoking history. He has quit using smokeless tobacco.  ETOH:  reports no history of alcohol use. Family History:      Problem Relation Age of Onset    Diabetes Mother     Cancer Mother     Emphysema Father     Heart Disease Brother        Review of Systems:  Unable to obtain as patient is confused. Vitals and physical examination:  /78   Pulse 104   Temp 99.6 °F (37.6 °C) (Oral)   Resp 29   Wt 165 lb 5 oz (75 kg)   SpO2 96%   BMI 26.68 kg/m²   Gen/overall appearance: Not in acute distress. Alert. Confused. Head: Normocephalic, atraumatic  Eyes: EOMI, good acuity  ENT: Oral mucosa moist  Neck: No JVD, thyromegaly  CVS: Nml S1S2, no MRG. Slightly tachycardic. Pulm: Clear bilaterally. No crackles/wheezes  Gastrointestinal: Soft, NT/ND, +BS  Musculoskeletal: No edema. Warm  Neuro: No focal deficit.  Moves extremity spontaneously. Psychiatry: Appropriate affect. Not agitated. Skin: Warm, dry with normal turgor. No rash  Capillary refill: Brisk,< 3 seconds   Peripheral Pulses: +2 palpable, equal bilaterally       Labs/imaging/EKG:  CBC:   Recent Labs     08/05/20  0546 08/06/20  0528 08/08/20  0020   WBC 10.8 9.1 4.4   HGB 12.3* 12.0* 13.1*    163 118*     BMP:    Recent Labs     08/05/20  0546 08/06/20  0528 08/08/20  0020    137 137   K 3.8 3.9 4.3    103 102   CO2 25 23 17*   BUN 18 19 34*   CREATININE 1.3 1.0 1.8*   GLUCOSE 86 144* 243*     Hepatic:   Recent Labs     08/05/20  0546 08/08/20  0020   AST 12* 25   ALT 8* 13   BILITOT 0.4 0.8   ALKPHOS 70 144*       Ct Head Wo Contrast  Result Date: 8/8/2020  EXAMINATION: CT OF THE HEAD WITHOUT CONTRAST  8/8/2020 12:18 am TECHNIQUE: CT of the head was performed without the administration of intravenous contrast. Dose modulation, iterative reconstruction, and/or weight based adjustment of the mA/kV was utilized to reduce the radiation dose to as low as reasonably achievable. COMPARISON: 4 days prior. Correlation with recent MRI HISTORY: ORDERING SYSTEM PROVIDED HISTORY: AMS TECHNOLOGIST PROVIDED HISTORY: Reason for exam:->AMS Has a \"code stroke\" or \"stroke alert\" been called? ->No Reason for Exam: Fall (pt d/c from inpt unit at 1900 today. pt is coming in from [facility] via [address] [place] [address]. Pt fell out of bed is bleeding from penis where his ruiz catheter was . nursing at facility states he hasn't been acting right since he arrived today 08/07/2020 at 1900) FINDINGS: BRAIN/VENTRICLES: There is no acute intracranial hemorrhage, mass effect or midline shift. No abnormal extra-axial fluid collection. The gray-white differentiation is maintained without evidence of an acute infarct. Small lacunar infarct right head of caudate. Dilated VR space left inferior basal ganglia.   Prominent white matter hypointensity with subtle sulcal effacement in left temporal lobe and insula. There is no evidence of hydrocephalus. ORBITS: The visualized portion of the orbits demonstrate no acute abnormality. SINUSES: The visualized paranasal sinuses and mastoid air cells demonstrate no acute abnormality. SOFT TISSUES/SKULL:  No acute abnormality of the visualized skull or soft tissues. No CT evidence of acute infarct. Findings similar in comparison to the recent brain MRI and CT which demonstrate white matter edema in the left temporal lobe and insula. Differential diagnosis includes herpes encephalitis, limbic encephalitis, low grade neoplasm. Ct Cervical Spine Wo Contrast  Result Date: 8/8/2020  EXAMINATION: CT OF THE CERVICAL SPINE WITHOUT CONTRAST 8/8/2020 12:30 am TECHNIQUE: CT of the cervical spine was performed without the administration of intravenous contrast. Multiplanar reformatted images are provided for review. Dose modulation, iterative reconstruction, and/or weight based adjustment of the mA/kV was utilized to reduce the radiation dose to as low as reasonably achievable. COMPARISON: None. HISTORY: ORDERING SYSTEM PROVIDED HISTORY: fall TECHNOLOGIST PROVIDED HISTORY: Reason for exam:->fall Reason for Exam: Fall (pt d/c from inpt unit at 1900 today. pt is coming in from [facility] via [address] [place] [address]. Pt fell out of bed is bleeding from penis where his ruiz catheter was . nursing at facility states he hasn't been acting right since he arrived today 08/07/2020 at 1900) FINDINGS: BONES/ALIGNMENT: Imaging is limited by patient motion. No acute fracture or subluxation detected in the cervical spine. DEGENERATIVE CHANGES: Severe degenerative disc disease and spondylosis at C5-6 and C6-7. Additional moderate facet DJD throughout the cervical spine. SOFT TISSUES: There is no prevertebral soft tissue swelling. No acute abnormality of the cervical spine. EKG: Accelerated junctional rhythm. No acute ST/T changes. I reviewed EKG.     Discussed with ER provider.       Thank you Sincere Blake MD for the opportunity to be involved in this patient's care.    -----------------------------  Randall Cranker, MD  [facility]

## 2020-08-08 NOTE — CONSULTS
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 His smoking use included cigarettes. He has a 50.00 pack-year smoking history. He has quit using smokeless tobacco. He reports that he does not drink alcohol or use drugs.     Allergies:  Pcn [penicillins]    Current Medications:    aspirin EC tablet 81 mg, Daily  atorvastatin (LIPITOR) tablet 80 mg, Nightly  ferrous sulfate (IRON 325) tablet 325 mg, Daily with breakfast  latanoprost (XALATAN) 0.005 % ophthalmic solution 1 drop, Nightly  cefepime (MAXIPIME) 2 g IVPB minibag, Q12H  insulin glargine (LANTUS;BASAGLAR) injection pen 11 Units, Nightly  insulin lispro (1 Unit Dial) 4 Units, TID WC  insulin lispro (1 Unit Dial) 0-6 Units, TID WC  insulin lispro (1 Unit Dial) 0-3 Units, Nightly  glucose (GLUTOSE) 40 % oral gel 15 g, PRN  dextrose 50 % IV solution, PRN  glucagon (rDNA) injection 1 mg, PRN  dextrose 5 % solution, PRN  sodium chloride flush 0.9 % injection 10 mL, 2 times per day  sodium chloride flush 0.9 % injection 10 mL, PRN  acetaminophen (TYLENOL) tablet 650 mg, Q6H PRN    Or  acetaminophen (TYLENOL) suppository 650 mg, Q6H PRN  brimonidine (ALPHAGAN) 0.2 % ophthalmic solution 1 drop, Daily    And  timolol (TIMOPTIC) 0.5 % ophthalmic solution 1 drop, Daily  potassium phosphate 30 mmol in dextrose 5 % 250 mL IVPB, Once  magnesium oxide (MAG-OX) tablet 400 mg, TID  norepinephrine (LEVOPHED) 16 mg in dextrose 5% 250 mL infusion, Continuous  0.9 % sodium chloride bolus, Once  lactated ringers infusion, Continuous  lidocaine PF 1 % injection 5 mL, Once  sodium chloride flush 0.9 % injection 10 mL, 2 times per day  sodium chloride flush 0.9 % injection 10 mL, PRN        Review of Systems:   Could not be obtained      Physical exam:     Vitals:  BP (!) 76/42   Pulse 68   Temp 99 °F (37.2 °C) (Axillary)   Resp 18   Ht 5' 6\" (1.676 m)   Wt 170 lb 14.4 oz (77.5 kg)   SpO2 (!) 89%   BMI 27.58 kg/m²   Constitutional: Drowsy  Skin: no rash, turgor wnl  Heent:  eomi, mmm  Neck: no bruits or jvd noted  Cardiovascular:  S1, S2 without m/r/g  Respiratory: CTA B without w/r/r  Abdomen:  +bs, soft, nt, nd  Ext: No lower extremity edema  Psychiatric: mood and affect appropriate  Musculoskeletal:  Rom, muscular strength intact    Labs:  CBC:   Recent Labs     08/06/20  0528 08/08/20  0020   WBC 9.1 4.4   HGB 12.0* 13.1*    118*     BMP:    Recent Labs     08/06/20  0528 08/08/20  0020 08/08/20  0505    137 142   K 3.9 4.3 3.6    102 114*   CO2 23 17* 18*   BUN 19 34* 35*   CREATININE 1.0 1.8* 1.9*   GLUCOSE 144* 243* 112*     Ca/Mg/Phos:   Recent Labs     08/06/20  0528 08/08/20  0020 08/08/20  0505   CALCIUM 7.7* 8.0* 7.1*   MG 1.90  --  1.50*   PHOS  --   --  0.5*     Hepatic:   Recent Labs     08/08/20  0020 08/08/20  0505   AST 25 109*   ALT 13 56*   BILITOT 0.8 1.1*   ALKPHOS 144* 105     Troponin: No results for input(s): TROPONINI in the last 72 hours. BNP: No results for input(s): BNP in the last 72 hours. Lipids: No results for input(s): CHOL, TRIG, HDL, LDLCALC, LABVLDL in the last 72 hours. ABGs: No results for input(s): PHART, PO2ART, HKG7YRV in the last 72 hours.   INR:   Recent Labs     08/08/20  0020   INR 1.22*     UA:  Recent Labs     08/08/20  0120   COLORU RED*   CLARITYU TURBID*   GLUCOSEU 500*   BILIRUBINUR SMALL*   KETUA TRACE*   SPECGRAV 1.015   BLOODU LARGE* PHUR 5.5   PROTEINU >=300*   UROBILINOGEN 1.0   NITRU POSITIVE*   LEUKOCYTESUR TRACE*   LABMICR YES   URINETYPE NotGiven      Urine Microscopic:   Recent Labs     08/08/20  0120   HYALCAST 3   WBCUA 29*   RBCUA >100*   EPIU 2     Urine Culture: No results for input(s): LABURIN in the last 72 hours. Urine Chemistry: No results for input(s): [place], PROTEINUR, NAUR in the last 72 hours. IMAGING:  CT Cervical Spine WO Contrast   Final Result   No acute abnormality of the cervical spine. CT Head WO Contrast   Final Result   No CT evidence of acute infarct. Findings similar in comparison to the recent brain MRI and CT which   demonstrate white matter edema in the left temporal lobe and insula. Differential diagnosis includes herpes encephalitis, limbic encephalitis, low   grade neoplasm. XR CHEST PORTABLE   Final Result   No acute finding or significant change. US RENAL COMPLETE    (Results Pending)             I/O last 3 completed shifts: In: 1842.4 [I.V.:1792.4; IV Piggyback:50]  Out: 143 [YGSVS:114]          Assessment/Plan :      1.  DENZEL likely has ATN from septic shock  Maintain systolic blood pressure greater than 90. Agree with Levophed. Continue IV fluids. Will give hydrocortisone stress dose 50 mg every 8 hourly    2. Hypotension   Secondary to sepsis. Hold all blood pressure medications. Maintain systolic blood pressure greater than 90    3. Acid- base disorder. High anion gap metabolic acidosis secondary to DENZEL and has lactic acidosis    4. Urinary tract infection as etiology of septic shock. On broad-spectrum antibiotics per primary team    5. Hypophosphatemia. Will supplement intravenously.     6.  Hypomagnesemia we will supplement intravenously                  Thank you for allowing us to participate in care of Barabara Gosselin         Electronically signed by: Zully Damon MD, 8/8/2020, 9:02 AM    CC time 35 minutes  [facility] [address]  Office : [phone]  Fax :[phone]

## 2020-08-08 NOTE — CONSULTS
[facility]                     [facility], [address]                                  CONSULTATION    PATIENT NAME: Khalif Hung                      :        1940  MED REC NO:   [id number]                          ROOM:       5340  ACCOUNT NO:   [de-identified]                           ADMIT DATE: 2020  PROVIDER:     Kevin Shrestha MD    CONSULT DATE:  2020    REASON FOR UROLOGY CONSULT:  Gross hematuria, retention. HISTORY OF PRESENT ILLNESS:  This is an 24-year-old gentleman who comes  in from a nursing home with confusion. Apparently, the patient had a  Snyder catheter at the nursing home, but it is unclear why. When he came  here, he was found to be disheveled and confused with a recent fall and  his catheter had blood in it and appeared to be not draining. The  emergency room staff changed the Snyder last night and there has been  bloody urine drain ever since. The urinalysis appear infected and he  has been hypotensive on the floor this morning. The patient is a poor  historian, but according to the chart there is no recent report of  prostate surgery, prostate cancer or bladder cancer. MEDICATIONS:  The patient is on aspirin, Lipitor, IV antibiotics,  insulin. ALLERGIES:  To PENICILLIN. PAST SURGICAL HISTORY:  No recent urologic surgery. PAST MEDICAL HISTORY:  History of stroke, diabetes, hypertension, CAD,  renal insufficiency. HABITS:  The patient quit smoking 39 years ago, does not drink. FAMILY HISTORY:  Negative for prostate or bladder cancer. REVIEW OF SYSTEMS:  All 12 systems were reviewed and were negative  except for the hematuria and confusion. PHYSICAL EXAMINATION:  GENERAL:  An 24-year-old gentleman who does appear to be somewhat  somnolent, does not answer questions appropriately, but responds to  stimuli.   VITAL SIGNS:  Show temperature 99.2, it was 101.2 earlier; his  respiratory rate is 18, his pulse is 68, his blood pressure is 76/42. NECK:  Supple. Trachea is midline. LUNGS:  Respiratory effort shows equal breath sounds. CARDIOVASCULAR:  S1, S2, regular rate. ABDOMEN:  Soft. No enlarged liver or spleen. SKIN:  No bruising or ecchymosis on the skin. LYMPHATICS:  Lymph nodes are not enlarged in the inguinal, neck or  axillary chain. GENITOURINARY:  The penis is normal and uncircumcised. There is a Snyder  catheter draining blood tinged urine. Shaft and meatus are normal.   Testicles and epididymis are normal.  Prostate is nontender. Seminal  vesicles are not palpable. LABORATORY DATA:  His creatinine is 1.0. IMPRESSION:  An [de-identified]year-old gentleman with:  1. Gross hematuria. 2.  Indwelling Snyder of unclear reason. 3.  UTI. 4.  Sepsis. RECOMMENDATIONS:  1. Continue Snyder to gravity, but use a plug to prevent trauma and  drain every shift. 2.  Irrigate every shift. 3.  Hold blood thinners. 4.  Agree with renal ultrasound. 5.  Continue antibiotics. 6.  Check cultures. RORY Solano MD    D: 08/08/2020 10:07:35       T: 08/08/2020 10:58:09     [id number]  Job#: [id number]     Doc#: [id number]    CC:

## 2020-08-08 NOTE — CONSULTS
Clinical Pharmacy Note: Pharmacy to Dose Vancomycin    Monster June is a [de-identified] y.o. male started on Vancomycin for Severe Sepsis due to pneumonia and urinary tract infection; consult received from Dr. Peter Toussaint to manage therapy. Also receiving the following antibiotics: meropenem. Assessment/Plan:  Initiate vancomycin 1500 mg IV every 36 hours. Changes in regimen will be determined based on culture results, renal function, and clinical response. Pharmacy will continue to monitor and adjust regimen as necessary. Allergies:  Pcn [penicillins]     Recent Labs     08/08/20  0020 08/08/20  0505   CREATININE 1.8* 1.9*       Recent Labs     08/06/20  0528 08/08/20  0020   WBC 9.1 4.4       Ht Readings from Last 1 Encounters:   08/08/20 5' 6\" (1.676 m)        Wt Readings from Last 1 Encounters:   08/08/20 170 lb 14.4 oz (77.5 kg)         Estimated Creatinine Clearance: 30 mL/min (A) (based on SCr of 1.9 mg/dL (H)).       Thank you for the consult,  Olegario GardinerD

## 2020-08-08 NOTE — PLAN OF CARE
Problem: Falls - Risk of:  Goal: Will remain free from falls  Description: Will remain free from falls  8/8/2020 1208 by Priya Elder RN  Outcome: Ongoing  8/8/2020 1208 by Priya Elder RN  Outcome: Ongoing  8/8/2020 0908 by Chandana Celestin RN  Outcome: Ongoing  Note: Remains free from falls this shift. Goal: Absence of physical injury  Description: Absence of physical injury  8/8/2020 1208 by Priya Elder RN  Outcome: Ongoing  8/8/2020 1208 by Priya Elder RN  Outcome: Ongoing     Problem: Confusion - Acute:  Goal: Absence of continued neurological deterioration signs and symptoms  Description: Absence of continued neurological deterioration signs and symptoms  8/8/2020 1208 by Priya Elder RN  Outcome: Ongoing  8/8/2020 1208 by Priya Elder RN  Outcome: Ongoing  8/8/2020 0908 by Chandana Celestin RN  Outcome: Ongoing  Note: Remains confused throughout shift. Goal: Mental status will be restored to baseline  Description: Mental status will be restored to baseline  8/8/2020 1208 by Priya Elder RN  Outcome: Ongoing  8/8/2020 1208 by Priya Elder RN  Outcome: Ongoing     Problem: Discharge Planning:  Goal: Ability to perform activities of daily living will improve  Description: Ability to perform activities of daily living will improve  8/8/2020 1208 by Priya Elder RN  Outcome: Ongoing  8/8/2020 1208 by Priya Elder RN  Outcome: Ongoing  Goal: Participates in care planning  Description: Participates in care planning  8/8/2020 1208 by Priya Elder RN  Outcome: Ongoing  8/8/2020 1208 by Priya Elder RN  Outcome: Ongoing     Problem: Injury - Risk of, Physical Injury:  Goal: Will remain free from falls  Description: Will remain free from falls  8/8/2020 1208 by Priya Elder RN  Outcome: Ongoing  8/8/2020 1208 by Priya Elder RN  Outcome: Ongoing  8/8/2020 0908 by Chandana Celestin RN  Outcome: Ongoing  Note: Remains free from falls this shift.   Goal: Absence of physical injury  Description: Absence of physical injury  8/8/2020 1208 by Porsha Sorensen RN  Outcome: Ongoing  8/8/2020 1208 by Porsha Sorensen RN  Outcome: Ongoing     Problem: Mood - Altered:  Goal: Mood stable  Description: Mood stable  8/8/2020 1208 by Porsha Sorensen RN  Outcome: Ongoing  8/8/2020 1208 by Porsha Sorensen RN  Outcome: Ongoing  Goal: Absence of abusive behavior  Description: Absence of abusive behavior  8/8/2020 1208 by Porsha Sorensen RN  Outcome: Ongoing  8/8/2020 1208 by Porsha Sorensen RN  Outcome: Ongoing  Goal: Verbalizations of feeling emotionally comfortable while being cared for will increase  Description: Verbalizations of feeling emotionally comfortable while being cared for will increase  8/8/2020 1208 by Porsha Sorensen RN  Outcome: Ongoing  8/8/2020 1208 by Porsha Sorensen RN  Outcome: Ongoing     Problem: Psychomotor Activity - Altered:  Goal: Absence of psychomotor disturbance signs and symptoms  Description: Absence of psychomotor disturbance signs and symptoms  8/8/2020 1208 by Porsha Sorensen RN  Outcome: Ongoing  8/8/2020 1208 by Porsha Sorensen RN  Outcome: Ongoing     Problem: Sensory Perception - Impaired:  Goal: Demonstrations of improved sensory functioning will increase  Description: Demonstrations of improved sensory functioning will increase  8/8/2020 1208 by Porsha Sorensen RN  Outcome: Ongoing  8/8/2020 1208 by Porsha Sorensen RN  Outcome: Ongoing  Goal: Decrease in sensory misperception frequency  Description: Decrease in sensory misperception frequency  8/8/2020 1208 by Porsha Sorensen RN  Outcome: Ongoing  8/8/2020 1208 by Porsha Sorensen RN  Outcome: Ongoing  Goal: Able to refrain from responding to false sensory perceptions  Description: Able to refrain from responding to false sensory perceptions  8/8/2020 1208 by Porsha Sorensen RN  Outcome: Ongoing  8/8/2020 1208 by Porsha Sorensen RN  Outcome: Ongoing  Goal: Demonstrates accurate environmental perceptions  Description: Demonstrates accurate environmental perceptions  8/8/2020 1208 by Chad Young RN  Outcome: Ongoing  8/8/2020 1208 by Chad Young RN  Outcome: Ongoing  Goal: Able to distinguish between reality-based and nonreality-based thinking  Description: Able to distinguish between reality-based and nonreality-based thinking  8/8/2020 1208 by Chad Young RN  Outcome: Ongoing  8/8/2020 1208 by Chad Young RN  Outcome: Ongoing  Goal: Able to interrupt nonreality-based thinking  Description: Able to interrupt nonreality-based thinking  8/8/2020 1208 by Chad Young RN  Outcome: Ongoing  8/8/2020 1208 by Chad Young RN  Outcome: Ongoing     Problem: Sleep Pattern Disturbance:  Goal: Appears well-rested  Description: Appears well-rested  8/8/2020 1208 by Chad Young RN  Outcome: Ongoing  8/8/2020 1208 by Chad Young RN  Outcome: Ongoing     Problem: Gas Exchange - Impaired:  Goal: Levels of oxygenation will improve  Description: Levels of oxygenation will improve  8/8/2020 1208 by Chad Young RN  Outcome: Ongoing  8/8/2020 1208 by Chad Young RN  Outcome: Ongoing  8/8/2020 0909 by Anil Lowry RN  Outcome: Ongoing  Note: O2 sat maintained on 3L O2 per NC this shift. Problem: Infection, Septic Shock:  Goal: Will show no infection signs and symptoms  Description: Will show no infection signs and symptoms  8/8/2020 1208 by Chad Young RN  Outcome: Ongoing  8/8/2020 1208 by Chad Young RN  Outcome: Ongoing  8/8/2020 0909 by Anil Lowry RN  Outcome: Ongoing  Note: Temp 101 axillary on arrival, Tylenol administered.      Problem: Tissue Perfusion, Altered:  Goal: Circulatory function within specified parameters  Description: Circulatory function within specified parameters  8/8/2020 1208 by Chad Young RN  Outcome: Ongoing  8/8/2020 1208 by Chad Young RN  Outcome: Ongoing  8/8/2020 0909 by Anil Lowry RN  Outcome: Ongoing  Note: BP dropping over course of shift, fluid bolus started this am but repeat BP even lower, oncoming RN notified.      Problem: Venous Thromboembolism:  Goal: Will show no signs or symptoms of venous thromboembolism  Description: Will show no signs or symptoms of venous thromboembolism  8/8/2020 1208 by Judge Carlos RN  Outcome: Ongoing  8/8/2020 1208 by Judge Carlos RN  Outcome: Ongoing  Goal: Absence of signs or symptoms of impaired coagulation  Description: Absence of signs or symptoms of impaired coagulation  8/8/2020 1208 by Judge Carlos RN  Outcome: Ongoing  8/8/2020 1208 by Judge Carlos RN  Outcome: Ongoing

## 2020-08-08 NOTE — PROGRESS NOTES
BP noted to be 74/50, cuff repositioned, repeat was 85/39. Manual BP heard first @80. Lab called with panic phosphorous level of 0.5, advised it was rechecked x2 on different machines. Message sent to Dr. Madonna Ascencio via [employer] regarding BP and phosphorous as well as magnesium level of 1.5.

## 2020-08-08 NOTE — PROGRESS NOTES
gu note dictated    Pt here for ams. Had ruiz for unclear reasons.   Trauma and hematuria      recc    Ruiz irrigates well  Hold asa  Iv antibx  Ruiz to plug to prevent trauma  Will follow

## 2020-08-09 NOTE — PROGRESS NOTES
Pulmonary & Critical Care Medicine ICU Progress Note      ASSESSMENT AND PLAN:     Acute Hypoxic Respiratory Failure due to sepsis and pneumonia   Mechanical Ventilation with Assist Control    Decrease PEEP from 10 to 7   Change Propofol infusion to Midazolam infusion for sedation due to increased TG   Fentanyl infusion for analgesia using CPOT   Severe Sepsis due to Urinary Tract Infection with bacteremia   Treating with antibiotics   Septic Shock   Supporting blood pressure with Levophed   Will add Vasopressin if Levophed dose is greater than 50 mcg per minute   Hydrocortisone 50 mg IV every 6 hours for septic shock  Klebsiella Oxytoca Urinary Tract Infection   Treating with Meropenem  Klebsiella Oxytoca Bacteremia due to urinary tract infection   Treating with Meropenem  Right Lower Lobe Pneumonia   Treating with Meropenem and Vancomycin   Nasal MRSA DNA Probe is pending  Acute Kidney Injury   Nephrology is starting CRRT today  Lactic Acidosis due to sepsis   Treating with antibiotics, vasopressors, and steroids  Cardiac Arrest 8/8/20 due to sepsis  · ROSC obtained with epinephrine  Hematuria  · Management per Urology  · No anticoagulants for now  Coronary Artery Disease  · Status post CABG  · Hold aspirin due to hematuria  · Continue Atorvastatin   Diabetes Mellitus with hyperglycemia  · Start NPH Insulin 20 units every 8 hours - hold if glucose less than 160  · Sliding Scale Insulin       PROPHYLAXIS:   DVT Prophylaxis with Protonix  GI Prophylaxis with Lower Extremity Sequential Compression Device   Pneumonia Prophylaxis with Chlorhexidene    NUTRITION:   NPO for now    DISPOSITION:   ICU Patient        REASON FOR VISIT:  acute respiratory failure       UPDATE:   Intubated and on mechanical ventilation. Vascath placed today to start CRRT. He is on Levophed for shock. CHIEF COMPLAINT:  Unable to obtain due to intubation and sedation.      HISTORY:  Unable to obtain due to intubation and sedation. REVIEW OF SYMPTOMS:  Unable to obtain due to intubation and sedation.       MECHANICAL VENTILATION:  Intubated 8/8/20  Vent Mode: AC/VC Rate Set: 32 bmp/Vt Ordered: 500 mL/ /FiO2 : 40 %    ABG:   Recent Labs     08/08/20  1839 08/09/20  0632   PHART 7.150* 7.214*   HOA1KHY 40.5 31.4*   PO2ART 72.6* 142.0*         IV:   sodium bicarbonate infusion 100 mL/hr at 08/09/20 0211    prismaSATE BGK 4/2.5 1,000 mL/hr (08/09/20 1434)    prismaSol BGK 2/0 500 mL/hr (08/09/20 1428)    prismaSol BGK 2/0 1,000 mL/hr (08/09/20 1439)    dextrose      norepinephrine 40 mcg/min (08/09/20 1234)    propofol 30 mcg/kg/min (08/08/20 1519)    fentaNYL (SUBLIMAZE) infusion 0.5 mcg/kg/hr (08/08/20 1635)       Intake/Output Summary (Last 24 hours) at 8/9/2020 1546  Last data filed at 8/9/2020 8883  Gross per 24 hour   Intake 4514 ml   Output 90 ml   Net 4424 ml       MEDICATIONS:  Scheduled Meds:   dextrose  50 mL Intravenous Once    vancomycin (VANCOCIN) intermittent dosing (placeholder)   Other RX Placeholder    heparin (porcine)        hydrocortisone sodium succinate PF  50 mg Intravenous Q6H    latanoprost  1 drop Left Eye Nightly    sodium chloride flush  10 mL Intravenous 2 times per day    brimonidine  1 drop Both Eyes Daily    And    timolol  1 drop Both Eyes Daily    sodium chloride flush  10 mL Intravenous 2 times per day    chlorhexidine  15 mL Mouth/Throat BID    atorvastatin  80 mg Per NG tube Nightly    insulin lispro  0-12 Units Subcutaneous Q4H    pantoprazole  40 mg Intravenous Daily    meropenem  1 g Intravenous Q12H     PRN Meds:  sodium chloride, potassium chloride, magnesium sulfate, calcium gluconate **OR** calcium gluconate **OR** calcium gluconate **OR** calcium gluconate, sodium phosphate IVPB **OR** sodium phosphate IVPB **OR** sodium phosphate IVPB **OR** sodium phosphate IVPB, dextrose, dextrose, sodium chloride flush, acetaminophen **OR** acetaminophen, sodium chloride flush      PHYSICAL EXAM:  Vital Signs: BP (!) 130/57   Pulse 108   Temp 96.5 °F (35.8 °C) (Temporal)   Resp (!) 1   Ht 5' 6\" (1.676 m)   Wt 184 lb 6.4 oz (83.6 kg)   SpO2 96%   BMI 29.76 kg/m²      Gen: Intubated and on mechanical ventilation. ENT:   Endotracheal tube is in place. No lip lesions. Resp:   No accessory muscle use. Clear lungs. CV:   PMI is normal. No murmur or gallop. GI:   Soft and not distended. No tenderness. Skin:  Warm and dry. No rash. Neuro:  Sedated. No tremor. LAB RESULTS:  CBC:   Recent Labs     08/08/20  0020 08/08/20  0505 08/08/20  1449 08/09/20  0636   WBC 4.4  --   --  39.5*   HGB 13.1* 11.2* 11.6* 12.5*   HCT 40.2* 34.6*  --  39.0*   MCV 88.9  --   --  88.4   *  --   --  84*     CHEMISTRY:   Recent Labs     08/08/20  0505 08/09/20  0013 08/09/20  0636    133* 132*   K 3.6 6.7* 6.8*   * 105 100   CO2 18* 14* 14*   BUN 35* 45* 46*   CREATININE 1.9* 2.8* 3.4*   PHOS 0.5* 8.2* 8.1*   GLUCOSE 112* 308* 335*     LIVER PROFILE:   Recent Labs     08/08/20  0020 08/08/20  0505 08/09/20  0636   AST 25 109* 82*   ALT 13 56* 74*   BILITOT 0.8 1.1* 0.9   ALKPHOS 144* 105 130*     ABG:   Recent Labs     08/08/20  1839 08/09/20  0632   PHART 7.150* 7.214*   FUE0KFD 40.5 31.4*   PO2ART 72.6* 142.0*       LACTIC ACID:  Results for Sherri Cavazos (MRN [id number]) as of 8/9/2020 15:47   Ref.  Range 8/8/2020 02:30 8/8/2020 05:05 8/9/2020 00:13 8/9/2020 06:36   Lactic Acid Latest Ref Range: 0.4 - 2.0 mmol/L 6.5 (HH) 3.5 (H) 3.7 (H) 4.7 (HH)          CULTURES:  Blood Cultures 8/8/20:  Klebsiella oxytoca  Sputum Culture:  pending  Urine Culture 8/8/20:   Klebsiella oxytoca  SARS-CoV-2 on 8/8/20:  Not detected       CHEST XRAY:  Results for orders placed during the hospital encounter of 08/07/20   XR CHEST PORTABLE    Narrative EXAMINATION:  ONE XRAY VIEW OF THE CHEST    8/9/2020 10:12 am    COMPARISON:  08/09/2020 at 0835 hours    HISTORY:  [address] PROVIDED HISTORY: Acute Respiratory Failure - evaluate  vascath placement position  TECHNOLOGIST PROVIDED HISTORY:  Reason for exam:->Acute Respiratory Failure - evaluate vascath placement  position  Acuity: Unknown    FINDINGS:  A single frontal view of the chest was obtained. There is a stable  endotracheal tube with tip lying approximately 5.5 cm above the clif. There has been interval placement of a right internal jugular Vas-Cath, with  tip of the catheter overlying the SVC. There is a stable right arm PICC in  place with tip overlying the SVC. The heart size and mediastinal contours  are stable, and within normal limits. There is stable multifocal airspace  disease throughout both lungs. There is a stable calcified granuloma within  the left upper lobe. There is no evidence of a pneumothorax. Impression 1. Interval right internal jugular Vas-Cath placement, in proper position,  without evidence of a pneumothorax. 2. Stable multifocal airspace disease, likely a combination of asymmetric  pulmonary edema and multifocal pneumonia. CRITICAL CARE TIME:  I was at the bedside or in the ICU for 60 minutes providing Critical Care to Toma Chowdhury.

## 2020-08-09 NOTE — PROGRESS NOTES
Office : [phone]     Fax :[phone]       Nephrology  Note        Chief Complaint:    Chief Complaint   Patient presents with    Fall     pt d/c from inpt unit at 200 today. pt is coming in from [facility] via [address] Pt fell out of bed is bleeding from penis where his ruiz catheter was . nursing at facility states he hasn't been acting right since he arrived today 08/07/2020 at 1900         History of Present Ilness:    Bill Hutchinson is a [de-identified] y.o. male with past medical history diabetes type 2, CVA, essential hypertension, hyperlipidemia, CAD, who was recently discharged from hospital on 8/7/2020 following admission for hallucination, had extensive work-up with no significant etiology noted (suspected to have had some seizure with postictal symptoms). He was discharged to SNF, where he was sent back to the emergency room for evaluation for increasing confusion. It was also reported that patient fell at the nursing home, sustained a large hematoma to right hand. On arrival in the emergency room, he was also noted to have hematuria, likely from traumatic pull of his Ruiz catheter. Patient appears to be confused and unable to provide any relevant information.     His creatinine is elevated at 1.9,  compared to baseline 1.0 prior to discharge. He has elevated lactic acid level of 7.3. Urinalysis is abnormal.      He is hypotensive systolic blood pressure in 60s.   Started on Levophed    Interval hx     Intubated on vent support   On levophed 35 mics   UOP - anuric   K is 6.8   BUN and creat elevated       Past Medical History:   Diagnosis Date    A-fib ([facility] [address] 7/12/2012    Acute encephalopathy     Acute kidney injury ([facility] [address] 8/3/2020    Altered mental status     Colon polyp     Glaucoma     HTN (hypertension) 4/2/2012    Hyperlipidemia     Hypertension     Osteoarthritis     S/P CABG x 4 2/12/2013    Type II or unspecified type diabetes mellitus without mention of complication, not stated as uncontrolled     Unspecified sleep apnea        Past Surgical History:   Procedure Laterality Date    COLONOSCOPY      EYE SURGERY      INTUBATION  8/8/2020         VASECTOMY  1974       Family History   Problem Relation Age of Onset    Diabetes Mother     Cancer Mother     Emphysema Father     Heart Disease Brother          Current Medications:    sodium bicarbonate 150 mEq in dextrose 5 % 1,000 mL infusion, Continuous  dextrose 50 % IV solution, Once  insulin regular (HUMULIN R;NOVOLIN R) injection 10 Units, Once  calcium gluconate 1 g in dextrose 5 % 100 mL IVPB, Once  magnesium sulfate 2 g in 50 mL IVPB premix, Once  latanoprost (XALATAN) 0.005 % ophthalmic solution 1 drop, Nightly  dextrose 50 % IV solution, PRN  dextrose 5 % solution, PRN  sodium chloride flush 0.9 % injection 10 mL, 2 times per day  sodium chloride flush 0.9 % injection 10 mL, PRN  acetaminophen (TYLENOL) tablet 650 mg, Q6H PRN    Or  acetaminophen (TYLENOL) suppository 650 mg, Q6H PRN  brimonidine (ALPHAGAN) 0.2 % ophthalmic solution 1 drop, Daily    And  timolol (TIMOPTIC) 0.5 % ophthalmic solution 1 drop, Daily  norepinephrine (LEVOPHED) 16 mg in dextrose 5% 250 mL infusion, Continuous  lidocaine PF 1 % injection 5 mL, Once  sodium chloride flush 0.9 % injection 10 mL, 2 times per day  sodium chloride flush 0.9 % injection 10 mL, PRN  hydrocortisone sodium succinate PF (SOLU-CORTEF) injection 50 mg, Q8H  chlorhexidine (PERIDEX) 0.12 % solution 15 mL, BID  atorvastatin (LIPITOR) tablet 80 mg, Nightly  insulin lispro (1 Unit Dial) 0-12 Units, Q4H  propofol injection, Titrated  fentaNYL (SUBLIMAZE) 1,000 mcg in sodium chloride 0.9 % 100 mL infusion, Continuous  pantoprazole (PROTONIX) injection 40 mg, Daily  meropenem (MERREM) 1 g in sodium chloride 0.9 % 100 mL IVPB (mini-bag), Q12H  vancomycin (VANCOCIN) 1,500 mg in dextrose 5 % 250 mL IVPB, Q36H  vasopressin 20 Units in dextrose 5 % 100 mL infusion, Continuous        Physical exam:       Vitals:  /75   Pulse 100   Temp 97.9 °F (36.6 °C) (Axillary)   Resp (!) 32   Ht 5' 6\" (1.676 m)   Wt 184 lb 6.4 oz (83.6 kg)   SpO2 97%   BMI 29.76 kg/m²   Constitutional: intubated   Skin: no rash, turgor wnl  Heent:  eomi, mmm  Neck: no bruits or jvd noted  Cardiovascular:  S1, S2 without m/r/g  Respiratory: CTA B without w/r/r  Abdomen:  +bs, soft, nt, nd  Ext: No lower extremity edema    Labs:  CBC:   Recent Labs     08/08/20  0020 08/08/20  0505 08/08/20  1449 08/09/20  0636   WBC 4.4  --   --  39.5*   HGB 13.1* 11.2* 11.6* 12.5*   *  --   --  84*     BMP:    Recent Labs     08/08/20  0505 08/09/20  0013 08/09/20  0636    133* 132*   K 3.6 6.7* 6.8*   * 105 100   CO2 18* 14* 14*   BUN 35* 45* 46*   CREATININE 1.9* 2.8* 3.4*   GLUCOSE 112* 308* 335*     Ca/Mg/Phos:   Recent Labs     08/08/20  0505 08/09/20  0013 08/09/20  0636   CALCIUM 7.1* 6.9* 6.4*   MG 1.50*  --  1.50*   PHOS 0.5* 8.2* 8.1*     Hepatic:   Recent Labs     08/08/20  0020 08/08/20  0505 08/09/20  0636   AST 25 109* 82*   ALT 13 56* 74*   BILITOT 0.8 1.1* 0.9   ALKPHOS 144* 105 130*     Troponin: No results for input(s): TROPONINI in the last 72 hours. BNP: No results for input(s): BNP in the last 72 hours. Lipids: No results for input(s): CHOL, TRIG, HDL, LDLCALC, LABVLDL in the last 72 hours.   ABGs:   Recent Labs     08/09/20  0632   PHART 7.214*   PO2ART 142.0*   [id number] 31.4*     INR:   Recent Labs     08/08/20  0020   INR 1.22*     UA:  Recent Labs     08/08/20  0120   COLORU RED*   CLARITYU TURBID*   GLUCOSEU 500*   BILIRUBINUR SMALL*   KETUA TRACE*   SPECGRAV 1.015   BLOODU LARGE*   PHUR 5.5   PROTEINU >=300*   UROBILINOGEN 1.0   NITRU POSITIVE*   LEUKOCYTESUR needed    6. Hyperkalemia- will give stat calcium gluconate 1 gm, D50, Reg insulin stat     D/w Dr. Rosangela Gallo and Dr. Maddy Villaseñor. Critical Care   Due to the high probability of clinically significant life threating deterioration of the patient's condition that required my urgent intervention, a total critical care time of >35 minutes was used. This time excludes any time that may have been spent performing procedures. This includes but not limited to vital sign monitoring, telemetry monitoring, continuous pulse oximety, IV medication, clinical response to the IV medications, documentation time , consultation time, interpretation of lab data, review of nursing notes and old record review. All questions and concerns were addressed to the patient/family. Alternatives to my treatment were discussed. The note was completed using EMR. Every effort was made to ensure accuracy; however, inadvertent computerized transcription errors may be present.             Thank you for allowing us to participate in care of Alex Abdalla       Electronically signed by: Fifi Kessler MD, 8/9/2020, 8:24 AM    [facility] [address]  Office : [phone]  Fax :[phone]

## 2020-08-09 NOTE — PROGRESS NOTES
Received [employer] message that his blood pressure decreased on CRRT and Levophed dose increased. Will now add Vasopressin for septic shock.

## 2020-08-09 NOTE — PROGRESS NOTES
Shahid Stewart is a [de-identified] y.o. male patient. 1. Septicemia ([facility] [address]    2. Fall, initial encounter    3.  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    Type II or unspecified type diabetes mellitus without mention of complication, not stated as uncontrolled     Unspecified sleep apnea      Past Surgical History Pertinent Negatives:   Procedure Date Noted    APPENDECTOMY 12/13/2011    BRAIN SURGERY 12/13/2011    CARDIAC VALVE REPLACEMENT 12/13/2011    CHOLECYSTECTOMY 12/13/2011    COLON SURGERY 12/13/2011    CORONARY ARTERY BYPASS GRAFT 12/13/2011    COSMETIC SURGERY 12/13/2011    FRACTURE SURGERY 12/13/2011    HERNIA REPAIR 12/13/2011    JOINT REPLACEMENT 12/13/2011    PROSTATE SURGERY 12/13/2011    SMALL INTESTINE SURGERY 12/13/2011    SPINE SURGERY 12/13/2011    UPPER GASTROINTESTINAL ENDOSCOPY 12/13/2011     Scheduled Meds:   dextrose  50 mL Intravenous Once    calcium gluconate IVPB  1 g Intravenous Once    vancomycin (VANCOCIN) intermittent dosing (placeholder)   Other RX Placeholder    heparin (porcine)        hydrocortisone sodium succinate PF  50 mg Intravenous Q6H    latanoprost  1 drop Left Eye Nightly    sodium chloride flush  10 mL Intravenous 2 times per day    brimonidine  1 drop Both Eyes Daily    And    timolol  1 drop Both Eyes Daily    sodium chloride flush  10 mL Intravenous 2 times per day    chlorhexidine  15 mL Mouth/Throat BID    atorvastatin  80 mg Per NG tube Nightly    insulin lispro  0-12 Units Subcutaneous Q4H    pantoprazole  40 mg Intravenous Daily    meropenem  1 g Intravenous Q12H     Continuous Infusions:   sodium bicarbonate infusion 100 mL/hr at 08/09/20 0211    prismaSATE BGK 4/2.5      prismaSol BGK 2/0      prismaSol BGK 2/0      dextrose      norepinephrine 35 mcg/min (08/09/20 1049)    propofol 30 mcg/kg/min (08/08/20 1519)    fentaNYL (SUBLIMAZE) infusion 0.5 mcg/kg/hr (08/08/20 1635)     PRN Meds:sodium chloride, potassium chloride, magnesium sulfate, calcium gluconate **OR** calcium gluconate **OR** calcium gluconate **OR** calcium gluconate, sodium phosphate IVPB **OR** sodium phosphate IVPB **OR** sodium phosphate IVPB **OR** sodium phosphate IVPB, dextrose, dextrose, sodium chloride flush, acetaminophen **OR** acetaminophen, sodium chloride flush    Allergies   Allergen Reactions    Pcn [Penicillins] Rash     Active Problems:    Complicated UTI (urinary tract infection)    Severe sepsis (HCC)    Septic shock (HCC)    Acute respiratory failure with hypoxia (HCC)    Pneumonia of right lower lobe due to infectious organism [facility])  Resolved Problems:    * No resolved hospital problems. *    Blood pressure 113/75, pulse 100, temperature 97.9 °F (36.6 °C), temperature source Axillary, resp. rate (!) 32, height 5' 6\" (1.676 m), weight 184 lb 6.4 oz (83.6 kg), SpO2 97 %. Subjective:   Diet: NPO. Activity level: Impaired due to weakness. Pain control: Well controlled. Objective:  Vital signs (most recent): Blood pressure 113/75, pulse 100, temperature 97.9 °F (36.6 °C), temperature source Axillary, resp. rate (!) 32, height 5' 6\" (1.676 m), weight 184 lb 6.4 oz (83.6 kg), SpO2 97 %. General appearance: Ill-appearing. Lungs:  Normal respiratory rate. Extremities: There is normal range of motion. Assessment:   Condition: In critical condition.          Hematuria  Renal failure  resp failure    recc    Intubated  Min urine output  Us shows no hydro and emptied bladder  Cont ruiz,  Irrigate q shift  Nehal Dunne MD  8/9/2020

## 2020-08-09 NOTE — PROGRESS NOTES
Dr. Claudy Thomas called back, said that Dr. Valentina Dugan was to call the unit but has not yet. If Dr. Valentina Dugan does not callback soon, OK with Dr. Claudy Thomas to change to CVVHDF    1620 callback received from Dr. Valentina Dugan, see new orders.

## 2020-08-09 NOTE — PROGRESS NOTES
Handoff report received. Patient is currently on levophed @ 50mcg/min and vasopressin @ 0.03units/min with borderline BP and HR sustained 120-130. No arterial line in place. Blood sugars trending 200-300 during day despite Q4H s/s and NPH insulin. Unable to obtain O2 reading on patient. Hospitalist notified- orders to start insulin gtt and add kaitlynn-synephrine and start weaning down levophed to assist with tachycardia. Per hospitalist, there is no in house physician in building this evening who is credentialed to place and arterial line. Renal panel results noted. K+ 7.0- however this was drawn just prior to the start of CRRT. Nephrology notified, patient is on a dialysate of 4.0/2.5- recommended a lower K+ solution vs awaiting next BMP results at 2200. Awaiting call back. CRRT is running per R IJ HD cath without complications.

## 2020-08-09 NOTE — PROGRESS NOTES
Nephrologist messaged: \"Pt is ordered to have CVVHD on CRRT with a replacement fluid. At least on our machines, CVVHD does not support a replacement. Would we like the change modes or did we not mean to have a replacement fluid? ? Pt is severely septic with UTI and PNA. DENZEL exacerbated by a code. Pt is stable on vent and almost maxed on Levophed.  Need Callback\"

## 2020-08-09 NOTE — PROCEDURES
Intubation Procedure Note    Indication: Respiratory failure    Consent: Unable to be obtained due to the emergent nature of this procedure. Medications Used: etomidate intravenously    Procedure: The patient was placed in the appropriate position. Cricoid pressure was not required. Intubation was performed by direct laryngoscopy using a laryngoscope and a 7.5 cuffed endotracheal tube. The cuff was then inflated and the tube was secured appropriately at a distance of 24 cm to the dental ridge. Initial confirmation of placement included bilateral breath sounds. A chest x-ray to verify correct placement of the tube showed appropriate tube position. The patient tolerated the procedure well.      Complications: None

## 2020-08-09 NOTE — PROGRESS NOTES
08/08/20 2028   Vent Patient Data   Plateau Pressure 25 [id number]   Static Compliance 30 mL/cmH2O   Dynamic Compliance 25 mL/cmH2O

## 2020-08-09 NOTE — PROGRESS NOTES
Vas Cath placed during morning rounds with Dr. Zach Molina, verified by Daniel Childers. VSS on levophed and vent. Sedation and restraints continue. Pt negative for COVID-19 and has now been transferred to ICU bed 7. Pharmacy notified of pt's arrival for CRRT fluids, family notified that pt can have visitors on at a time now and of new room number.

## 2020-08-09 NOTE — PROGRESS NOTES
08/09/20 0838   Vent Patient Data   Plateau Pressure 25 [id number]   Static Compliance 32.07 mL/cmH2O   Dynamic Compliance 25.32 mL/cmH2O

## 2020-08-09 NOTE — PROCEDURES
PROCEDURE: Vascath placement right internal jugular vein    CONSENT:  The procedure, risks, and benefits were explained to patient's son by Nephrologist and consent was obtained. TIME OUT:  The patient and procedure were properly identified with the nurse in the room. STERILE PREP:  Full maximum sterile field/barrier technique was followed (with hand hygiene, mask, sterile gown, sterile gloves, large sterile sheet). LOCAL ANESTHETIC:   Aqueous lidocaine 4 ml    PROCEDURE:   Using direct ultrasound guidance, a right internal jugular vein vascath was placed using a modified seldinger technique without difficulty. The internal jugular vein was entered percutaneously and a guidewire was advanced through the needle. The vein was dilated. The vascath was advanced over the guidewire and the guidewire was removed. Excellent return of non-pulsatile, dark venous blood from all lumens. All lumens were flushed with normal saline. Minimal bleeding occurred and there was hemostasis prior to conclusion of procedure. Catheter was sutured in place and a sterile dressing was placed.     ESTIMATED BLOOD LOSS:  5 ml    COMPLICATIONS:  none    CHEST XRAY REVIEWED:  Right internal jugular vascath is in good position

## 2020-08-09 NOTE — PROGRESS NOTES
[facility] PROGRESS NOTE    8/9/2020 2:21 PM        Name: Maribel Borjas . Admitted: 8/7/2020  Primary Care Provider: Helena Das MD (Tel: [phone])    Brief Course: This is a pleasant [de-identified] y.o. male history of diabetes type 2, CVA, essential hypertension, hyperlipidemia, CAD, who was recently discharged from hospital on 8/7/2020 following admission for hallucination, had extensive work-up with no significant etiology noted (suspected to have had some seizure with postictal symptoms). He had unremarkable spinal fluid analysis as well. He was discharged to SNF, where he was sent back to the emergency room for evaluation for increasing confusion. It was also reported that patient fell at the nursing home, sustained a large hematoma to right hand.      CC: Hematuria     Subjective:    Patient is intubated and sedated  HD catheter today for CRRT  Hyperkalemia  Reviewed interval ancillary notes    Current Medications  sodium bicarbonate 150 mEq in dextrose 5 % 1,000 mL infusion, Continuous  dextrose 50 % IV solution, Once  vancomycin (VANCOCIN) intermittent dosing (placeholder), RX Placeholder  heparin (porcine) 1000 UNIT/ML injection,   prismaSATE BGK 4/2.5 dialysis solution, Continuous  0.9 % sodium chloride bolus, Once PRN  prismaSol BGK 2/0 dialysis solution, Continuous  potassium chloride 20 mEq/50 mL IVPB (Central Line), PRN  magnesium sulfate 1 g in dextrose 5% 100 mL IVPB, PRN  calcium gluconate 1 g in sodium chloride 50 mL, PRN    Or  calcium gluconate 2 g in sodium chloride 100 mL, PRN    Or  calcium gluconate 3 g in dextrose 5 % 100 mL IVPB, PRN    Or  calcium gluconate 4 g in dextrose 5 % 100 mL IVPB, PRN  sodium phosphate 6 mmol in dextrose 5 % 250 mL IVPB, PRN    Or  sodium phosphate 12 mmol in dextrose 5 % 250 mL IVPB, PRN    Or  sodium phosphate 18 mmol in dextrose 5 % 500 mL IVPB, PRN Or  sodium phosphate 24 mmol in dextrose 5 % 500 mL IVPB, PRN  prismaSol BGK 2/0 dialysis solution, Continuous  hydrocortisone sodium succinate PF (SOLU-CORTEF) injection 50 mg, Q6H  latanoprost (XALATAN) 0.005 % ophthalmic solution 1 drop, Nightly  dextrose 50 % IV solution, PRN  dextrose 5 % solution, PRN  sodium chloride flush 0.9 % injection 10 mL, 2 times per day  sodium chloride flush 0.9 % injection 10 mL, PRN  acetaminophen (TYLENOL) tablet 650 mg, Q6H PRN    Or  acetaminophen (TYLENOL) suppository 650 mg, Q6H PRN  brimonidine (ALPHAGAN) 0.2 % ophthalmic solution 1 drop, Daily    And  timolol (TIMOPTIC) 0.5 % ophthalmic solution 1 drop, Daily  norepinephrine (LEVOPHED) 16 mg in dextrose 5% 250 mL infusion, Continuous  sodium chloride flush 0.9 % injection 10 mL, 2 times per day  sodium chloride flush 0.9 % injection 10 mL, PRN  chlorhexidine (PERIDEX) 0.12 % solution 15 mL, BID  atorvastatin (LIPITOR) tablet 80 mg, Nightly  insulin lispro (1 Unit Dial) 0-12 Units, Q4H  propofol injection, Titrated  fentaNYL (SUBLIMAZE) 1,000 mcg in sodium chloride 0.9 % 100 mL infusion, Continuous  pantoprazole (PROTONIX) injection 40 mg, Daily  meropenem (MERREM) 1 g in sodium chloride 0.9 % 100 mL IVPB (mini-bag), Q12H        Objective:  BP (!) 124/53   Pulse 104   Temp 96.5 °F (35.8 °C) (Temporal)   Resp (!) 32   Ht 5' 6\" (1.676 m)   Wt 184 lb 6.4 oz (83.6 kg)   SpO2 97%   BMI 29.76 kg/m²     Intake/Output Summary (Last 24 hours) at 8/9/2020 1421  Last data filed at 8/9/2020 0621  Gross per 24 hour   Intake 4514 ml   Output 90 ml   Net 4424 ml      Wt Readings from Last 3 Encounters:   08/09/20 184 lb 6.4 oz (83.6 kg)   08/07/20 165 lb 8 oz (75.1 kg)   01/16/20 186 lb (84.4 kg)       General appearance: Sedated and intubated  Eyes: Sclera clear. Pupils equal.  ENT: Moist oral mucosa. Trachea midline, no adenopathy. Cardiovascular: Regular rhythm, normal S1, S2. No murmur.  No edema in lower extremities  Respiratory: Not using accessory muscles. Clear to auscultation  GI: Abdomen soft, no tenderness, not distended, normal bowel sounds  Musculoskeletal: No cyanosis in digits, neck supple  Neurology: Sedated intubated   psych: Normal affect. Alert and oriented in time, place and person  Skin: Warm, dry, normal turgor  Extremity exam shows brisk capillary refill. Peripheral pulses are palpable in lower extremities     Labs and Tests:  CBC:   Recent Labs     08/08/20  0020 08/08/20  0505 08/08/20  1449 08/09/20  0636   WBC 4.4  --   --  39.5*   HGB 13.1* 11.2* 11.6* 12.5*   *  --   --  84*     BMP:    Recent Labs     08/08/20  0505 08/09/20  0013 08/09/20  0636    133* 132*   K 3.6 6.7* 6.8*   * 105 100   CO2 18* 14* 14*   BUN 35* 45* 46*   CREATININE 1.9* 2.8* 3.4*   GLUCOSE 112* 308* 335*     Hepatic:   Recent Labs     08/08/20  0020 08/08/20  0505 08/09/20  0636   AST 25 109* 82*   ALT 13 56* 74*   BILITOT 0.8 1.1* 0.9   ALKPHOS 144* 105 130*     XR CHEST PORTABLE   Preliminary Result   1. Interval right internal jugular Vas-Cath placement, in proper position,   without evidence of a pneumothorax. 2. Stable multifocal airspace disease, likely a combination of asymmetric   pulmonary edema and multifocal pneumonia. XR CHEST PORTABLE   Preliminary Result   1. Stable appropriate positions of support apparatus. 2. Interval improvement in appearance of multifocal airspace opacities,   likely representing a combination of improving pulmonary edema and multifocal   pneumonia. XR CHEST PORTABLE   Final Result   Endotracheal tube in good position. New right greater than left airspace disease over the past 2 hours consistent   with developing edema. Superimposed pneumonia is considered. US RENAL COMPLETE   Final Result   1. No acute abnormality. XR CHEST PORTABLE   Preliminary Result   1. Slight interval progression of mild right basilar atelectasis. 2. Stable mild elevation of the right hemidiaphragm. CT Cervical Spine WO Contrast   Final Result   No acute abnormality of the cervical spine. CT Head WO Contrast   Final Result   No CT evidence of acute infarct. Findings similar in comparison to the recent brain MRI and CT which   demonstrate white matter edema in the left temporal lobe and insula. Differential diagnosis includes herpes encephalitis, limbic encephalitis, low   grade neoplasm. XR CHEST PORTABLE   Final Result   No acute finding or significant change. XR CHEST PORTABLE    (Results Pending)       Problem List  Active Problems:    Complicated UTI (urinary tract infection)    Severe sepsis (HCC)    Septic shock (HCC)    Acute respiratory failure with hypoxia (HCC)    Pneumonia of right lower lobe due to infectious organism [facility])  Resolved Problems:    * No resolved hospital problems. *     Assessment & Plan:     Septic shock, secondary to pneumonia and urinary tract infection, and Klebsiella bacteremia  On levo fed, 35 mics, we can give vaso every levo requirement at 50 mics. Hydrocortisone stress dose  Continue Merrem and Vanco  COVID 19 testing negative, sputum culture and MRSA nasal.  Probe    Acute hypoxic respiratory failure secondary to pneumonia and sepsis  Mechanical ventilation  Critical care consult  Propofol and fentanyl     Lactic acidosis   Sodium bicarb 150 mEq at 100 mL/h    Acute kidney injury  Nephrology consult  Started on CRRT    Hyperkalemia  Treated with insulin, sodium bicarb, CRRT today.     UTI  Continue Merrem  Follow-up culture sensitivity    Status post cardiac arrest 8/8  Intubated  ROSC obtained with 1 round of epinephrine    Coronary artery disease  Status post CABG  Hold aspirin continue atorvastatin    Diabetes mellitus with hyperglycemia  Sliding scale insulin    Hypomagnesemia  We will replace    IV Access: PICC line and HD catheter  Snyder: Yes  Diet: Diet NPO Effective Now Exceptions are: Sips with Meds  Code:Full Code  DVT PPX Protonix  Disposition ICU      Etta Buenrostro MD   8/9/2020 2:21 PM

## 2020-08-10 NOTE — PROGRESS NOTES
Discussed with the nurse. He is on pre  Dialysate 2/0 at 1000 mL per hour and post dialysate solution 2/0 500 mL/h and dialysate 4/2.5 at 1000 mL per hour. BMP was done prior to starting CRRT. Do stat basic metabolic panel. If potassium still not coming down then will change all solutions to 2/0. Continue to replace calcium IV based on ionized calcium results. Also started on insulin drip which will help in hyperkalemia. Remains on high dose of levophed and vasopressin added.

## 2020-08-10 NOTE — PROGRESS NOTES
Hospitalist called to bedside, case reviewed. Aware of mental status, unable to do any head imaging as he is too unstable to travel, or come off of CRRT. Rhythm more irregular, EKG performed and confirms LEXIB- MD aware.

## 2020-08-10 NOTE — PROGRESS NOTES
Discussed with RN. Replace calcium per protocol. The ionized calcium is 0.77 so need calcium 2 g to be replaced stat.

## 2020-08-10 NOTE — PROGRESS NOTES
Spoke with nephrology, new orders for dialysate solution of 2/0 and to change the replacement fluids to bicarb. Order for continuous calcium fluids to infuse either through venous port of HD cath or peripherally. Pressor requirements now at levo @ 50 mcg/min, vaso @ 0.03 u/min, kaitlynn @ 180mcg/min.

## 2020-08-10 NOTE — PROGRESS NOTES
[facility], patient does not meet criteria for donation. In the event that he passes, call with TOD.

## 2020-08-10 NOTE — PROGRESS NOTES
Spoke with Dr. Filiberto Flores in length about patient's status. MD maintains that patient does not need arterial line placement and to continue to titrate gtt's up. Dr. Filiberto Flores continues to state that the CRRT should be stopped. Dr. Filiberto Flores has changed the epi gtt order from titrateable to non titrateable at 90 ml/hr. Spoke with Dr. Ck Lloyd from nephrology who states to continue the CRRT. Decrease the blood flow rate to 150 ml/hr and increase the continuous calcium chloride to 50 ml/hr.  Labs reviewed with MD.

## 2020-08-10 NOTE — SIGNIFICANT EVENT
[facility]   DEATH PRONOUNCEMENT NOTE      Name: Masoud Hughes . YOB: 1940 . Medical Records Number: [id number]             Date of admission: 8/7/2020  Date of death: 8/10/2020     Masoud Hughes is a [de-identified] y.o. male  who was admitted on 8/7/2020     Examination:  Patient found pulseless and without spontaneous respirations. Pupils fixed and dilated. Auscultation significant for absence of any heart or breath sounds.       Time of death:  46 HOURS    Preliminary cause of death: Septic shock    Other contributing diagnosis: ARF, hyperkalemia    Milind Dukes MD   8/10/2020 7:09 PM     Death summary as per Dr Aimee Milner

## 2020-08-10 NOTE — PROGRESS NOTES
Spoke with pulmonology, update given and case reviewed. No new orders and per Dr. Nancy Lozada patient does not need art line placement at this time. Will notify patient's son of declining status. Call received from nephrology, still awaiting stat BMP results, additional labs sent at this time. Will replace calcium as ordered per MAR. Insulin gtt started at this time.

## 2020-08-10 NOTE — ED PROVIDER NOTES
As physician-in-triage, I performed a medical screening history and physical exam on Monster [facility]. I also cared for and evaluated the patient in conjunction with the ED Advanced Practice Provider. All diagnostic, treatment, and disposition decisions were made by myself in conjunction with the advanced practice provider. For all further details of the patient's emergency department visit, please see the advanced practice provider's documentation. Patient presents to the ER for evaluation of acute on subacute psychosis, hallucinations and paranoia, focal neurologic deficits, mild psychosis, no endorsement of SI or HI, patient does have evidence of mild DENZEL, no clinical signs of sepsis no evidence of acute stroke, hyper glycemia without evidence of DKA. Zyprexa was added for agitation control. He will be admitted to hospital for the proceeding noted complaints rehydration IV fluids treatment of hyperglycemia and evaluation of acute delirium and possible psychiatric geriatric placement for psychiatric stabilization. Impression: Acute delirium psychosis and hallucinations, paranoia.   DENZEL and hyperglycemia     Ar Cummins MD  [id number]

## 2020-08-10 NOTE — DISCHARGE SUMMARY
[facility] Medicine  Death/Discharge Summary    Name:Vinny Paez       :  1940              MRN:  [id number]    Admit date:  2020    Discharge date:  8/10/2020    Admitting Physician:  Sarai Hutchinson MD  Discharge Physician: Charissa Morse        Admission Condition:  critical    Discharged Condition:      History of Present Illness: This is a pleasant [de-identified] y.o. male history of diabetes type 2, CVA, essential hypertension, hyperlipidemia, CAD, who was recently discharged from hospital on 2020 following admission for hallucination, had extensive work-up with no significant etiology noted (suspected to have had some seizure with postictal symptoms). He had unremarkable spinal fluid analysis as well. He was discharged to [facility], where he was sent back to the emergency room for evaluation for increasing confusion. It was also reported that patient fell at the nursing home, sustained a large hematoma to right hand. On arrival in the emergency room, he was also noted to have hematuria, likely from traumatic pull of his Snyder catheter. Patient appears to be confused and unable to provide any relevant information.     His creatinine is elevated at 1.8, compared to baseline 1.0 prior to discharge. He does have elevated lactic acid level of 7.3. Urinalysis is abnormal.  Patient is being admitted for management of suspected urinary tract infection, sepsis secondary to UTI. He had a cardiac arrest on  due to sepsis  Despite IV abx and 4 pressors he continued to decline with his pH reaching 6.9  Family meeting was held and as the pressors ran out they were not resumed.       Discharge Physical Exam:    Called by nurse that patient had become asystole on telemetry. Patient seen and examined. No palpable pulse. Pupils fixed and dilated. No cardiac or pulmonary activity. Patient pronounced dead. Time of Death: 18.54    Cause of Death: septic shock.         Disposition: Mimi    Time spent on Discharged is 30 minutes      Signed:  Darian Eugene MD  8/10/2020, 7:35 PM

## 2020-08-10 NOTE — PROGRESS NOTES
Pulmonary & Critical Care Medicine ICU Progress Note    Admit Date: 2020  PCP: Tari Lockhart MD    CC:  Cardiac arrest, klebsiella bacteremia  Events of Last 24 hours: Worsened overnight, now intubated, and on 4 vasopressors with high lactic acid. Sedation has been off since yesterday evening. Vitals:  Tmax:  VITALS:  /62   Pulse 131   Temp 94.9 °F (34.9 °C) (Temporal)   Resp (!) 36   Ht 5' 6\" (1.676 m)   Wt 189 lb 9.5 oz (86 kg)   SpO2 (!) 70%   BMI 30.60 kg/m²   24HR INTAKE/OUTPUT:      Intake/Output Summary (Last 24 hours) at 8/10/2020 0928  Last data filed at 8/10/2020 0800  Gross per 24 hour   Intake 3906.9 ml   Output 1446 ml   Net 2460.9 ml     CURRENT PULSE OXIMETRY:  SpO2: (unable to obtain)  24HR PULSE OXIMETRY RANGE:  SpO2  Av.1 %  Min: 70 %  Max: 100 %      Invasive Lines:   CVC: PICC and vas cath  Art Line none        Vent:  D# 2    Vent Settings:  Vent Mode: AC/VC Rate Set: 30 bmp/Vt Ordered: 500 mL/ /FiO2 : 100 %  PEEP 7  Recent Labs     20  1839 20  0632   PHART 7.150* 7.214*   XQF7QCK 40.5 31.4*   PO2ART 72.6* 142.0*         EXAM:  General: Unresponsive. Eyes: Pupils are equal but unreactive. No sclera icterus. No conjunctival injection. ENT: ETT in place  Neck: Trachea midline. Normal thyroid. Resp: No accessory muscle use. No crackles. No wheezing. No rhonchi. CV: Regular rate. Regular rhythm. No mumur or rub. No edema. GI: Non-tender. Non-distended. No masses. No organmegaly. No bowel sounds. Skin: Diffuse mottling with cyanosis of the hands, lips, tongue. Lymph: No cervical LAD. No supraclavicular LAD. M/S: marked cyanosis. No joint deformity. No clubbing. Neuro:Intubated, unresponsive. No pupils/gag/corneals response. No pain response. Psych: Intubated, obtunded.        IV:   EPINEPHrine infusion 30 mcg/min (08/10/20 0707)    norepinephrine (LEVOPHED) infusion (double concentration) 90 mcg/min (08/10/20 0811)    calcium chloride CRRT infusion 50 mL/hr at 08/10/20 0527    prismaSATE BGK 4/2.5      prismaSol BGK 4/2.5      midazolam Stopped (08/09/20 2028)    vasopressin (Septic Shock) infusion 0.04 Units/min (08/10/20 0200)    phenylephrine (ROE-SYNEPHRINE) 50mg/250mL infusion 300 mcg/min (08/10/20 0623)    insulin 36 Units/hr (08/10/20 0830)    dextrose      sodium bicarbonate infusion 200 mL/hr at 08/10/20 0449    dextrose      fentaNYL (SUBLIMAZE) infusion Stopped (08/09/20 2134)         Scheduled Meds:     dextrose  50 mL Intravenous Once    vancomycin (VANCOCIN) intermittent dosing (placeholder)   Other RX Placeholder    hydrocortisone sodium succinate PF  50 mg Intravenous Q6H    latanoprost  1 drop Left Eye Nightly    sodium chloride flush  10 mL Intravenous 2 times per day    brimonidine  1 drop Both Eyes Daily    And    timolol  1 drop Both Eyes Daily    sodium chloride flush  10 mL Intravenous 2 times per day    chlorhexidine  15 mL Mouth/Throat BID    pantoprazole  40 mg Intravenous Daily    meropenem  1 g Intravenous Q12H         Diet: Diet NPO Effective Now Exceptions are: Sips with Meds     Results:  CBC:   Recent Labs     08/08/20  0020 08/08/20  0505  08/09/20  0636 08/10/20  0322 08/10/20  0812   WBC 4.4  --   --  39.5* 38.7*  --    HGB 13.1* 11.2*   < > 12.5* 11.3* 11.3*   HCT 40.2* 34.6*  --  39.0* 35.6*  --    MCV 88.9  --   --  88.4 87.7  --    *  --   --  84* 60*  --     < > = values in this interval not displayed.      BMP:   Recent Labs     08/09/20  1830 08/09/20  2104 08/09/20  2134 08/10/20  0322   * 129*  --  130*   K 7.0* 7.0*  --  4.5   CL 99 96*  --  94*   CO2 14* 13*  --  15*   PHOS 8.1*  --  5.9* 5.9*   BUN 49* 37*  --  26*   CREATININE 3.3* 2.5*  --  1.9*     LIVER PROFILE:   Recent Labs     08/09/20  0636 08/09/20  1830 08/10/20  0322   AST 82* 257* 652*   ALT 74* 190* 539*   BILITOT 0.9 1.1* 1.3*   ALKPHOS 130* 211* 264*     PT/INR:   Recent Labs     08/08/20  0020 PROTIME 14.2*   INR 1.22*     APTT:   Recent Labs     08/08/20  0020   APTT 39.5*     UA:  Recent Labs     08/08/20  0120   COLORU RED*   PHUR 5.5   WBCUA 29*   RBCUA >100*   CLARITYU TURBID*   SPECGRAV 1.015   LEUKOCYTESUR TRACE*   UROBILINOGEN 1.0   BILIRUBINUR SMALL*   BLOODU LARGE*   GLUCOSEU 500*       Cultures:  Blood:  Urine:  Sputum:    Films:  CXR reviewed by me and it showed   XR CHEST PORTABLE   Final Result   Improving multifocal edema and/or superimposed pneumonia. XR CHEST PORTABLE   Final Result   1. Interval right internal jugular Vas-Cath placement, in proper position,   without evidence of a pneumothorax. 2. Stable multifocal airspace disease, likely a combination of asymmetric   pulmonary edema and multifocal pneumonia. XR CHEST PORTABLE   Final Result   1. Stable appropriate positions of support apparatus. 2. Interval improvement in appearance of multifocal airspace opacities,   likely representing a combination of improving pulmonary edema and multifocal   pneumonia. XR CHEST PORTABLE   Final Result   Endotracheal tube in good position. New right greater than left airspace disease over the past 2 hours consistent   with developing edema. Superimposed pneumonia is considered. US RENAL COMPLETE   Final Result   1. No acute abnormality. XR CHEST PORTABLE   Final Result   1. Slight interval progression of mild right basilar atelectasis. 2. Stable mild elevation of the right hemidiaphragm. CT Cervical Spine WO Contrast   Final Result   No acute abnormality of the cervical spine. CT Head WO Contrast   Final Result   No CT evidence of acute infarct. Findings similar in comparison to the recent brain MRI and CT which   demonstrate white matter edema in the left temporal lobe and insula. Differential diagnosis includes herpes encephalitis, limbic encephalitis, low   grade neoplasm.          XR CHEST PORTABLE   Final Result   No

## 2020-08-10 NOTE — PROGRESS NOTES
Hospitalist notified of lactic acid 11.1, no new orders. Blood pressure readings are very labile, ranging from SBP . BP readings are on left leg and patient is on levo @ 100 mcg/min, vaso @ 0.04 u /min, kaitlynn @ 300 mcg/min, and epi @ 12 mcg/min, so doubtful that readings are accurate. BUN/Creat much improved per morning BMP, so BP unlikely to be sustained in 60's contrary to readings. MD aware of the above.

## 2020-08-10 NOTE — PROGRESS NOTES
Shift summary: This RN assumed care of the patient at approximately 13:15.  1320: PICC RN was dressing the newly placed PICC line, this RN entered the room to assess pt and switch levophed to new line, IV had leaked and pt HR began to drop, a code was called and chest compressions started. After epi x2, pt breathing agonally and was emergently intubated. Pt stabilized a few hours later following vent setting adjustments based on ABG's and Levo titration.

## 2020-08-10 NOTE — PROGRESS NOTES
Spoke with patient's son, Mansi Barfield, in length about patient's current status. Support provided. He spoke with his brother and they made the decision to continue all treatment until the event that the patient arrests in which they do not want to proceed with CPR/ defibrillation. Family declined to come to bedside at this time. Will provide updates throughout night. MD notified and code status changed to DNR CCA. [facility] notified d/t GCS 3T off sedation and intubated, spoke with representative [facility] do not withdrawal care at this time, they are reviewing the patients chart for potential donation candidacy.

## 2020-08-10 NOTE — PROGRESS NOTES
Levophed @ 100 mcg/min, kaitlynn @ 300 mcg/min, and vaso @ 0.03 u /hr. SBP sustaining in 60's. Hospitalist notified and orders obtained to increase vaso to 0.04 u/min and to add epi gtt. CRRT running as ordered per R IJ HD cath, goal to keep even is no longer able to be met due to severe hypotension. Patient is fluid positive d/t multiple gtt's infusing and unable to pull fluid back off. Patient remains hypothermic. Unresponsive on vent with no sedation in place, however, taking an occasional 1-2 breaths over. EtCo2 12. No UOP this shift, however small amount of brown colored urine in tubing. Bleeding from meatus. Remains mottled.

## 2020-08-10 NOTE — PROGRESS NOTES
Spoke with Dr. Granda Frame- orders for stat BMP     Patient with no cough/gag reflex. Absent pupils and corneals. No withdrawal to pain in any extremity. Versed gtt stopped. Will notify MD as well as [facility]. Skin is cold and mottled. BUE with cap refill > 3 sec and very weak peripheral pulses. BLE with dopplered pulses only. Elise huggar applied at this time. Still unable to obtain O2 reading despite multiple methods. Will increase FiO2 to 100%.

## 2020-08-10 NOTE — PROGRESS NOTES
Pt started on CRRT today  Labs reviewed. Severe hyperkalemia, metabolic acidosis. PLAN:  Change pre and dialysate bags to 2/0  Add sod bicarb to post line (purple)  Add CaCl infusion (to venous access of CRRT)  Monitor labs    Spoke to ICU nurse on multiple occasion. Spoke to pharmacy about the changes. Encouraged to contact for any further question/concern.     Also discussed with Dr. Kimberly Gupta

## 2020-08-10 NOTE — PROGRESS NOTES
Per discussion with nursing staff, pt had absent reflexes on evaluation this evening (at the time, sedation was off). He is back on sedation at this time, barely breathing over vent. May need neurology evaluation/consult in the morning. He is currently undergoing bedside dialysis, on vent with multiple pressors. Not safe to transport down to radiology [facility].    >> Ordered insulin drip per nursing request.  >> Has been having intermittent afib; does have known hx of afib, previously not on anticoagulation. He has hematuria; no anticoagulation to be started now. Cardio could be consulted in the morning if deemed necessary. >> Had some BP challenges. Has been on levophed, vasopressin and kaitlynn added this evening. I am not credentialed to do a-line. Nurse will reach out to critical care about a-line.  >> Palliative care consult initiated on 8/8/2020 to assist with code status discussion.      SIGNED: Monika Nguyen MD . 8/9/2020

## 2020-08-10 NOTE — PROGRESS NOTES
08/09/20 2012   Vent Patient Data   Plateau Pressure 19 SXC10   Static Compliance 42 mL/cmH2O   Dynamic Compliance 38 mL/cmH2O

## 2020-08-10 NOTE — PROGRESS NOTES
Urology Progress Note  [facility]    Provider: Naif Crooks MD Patient ID:  Admission Date: 2020 Name: Catherine Minaya Date: 2020 MRN: [id number]   Patient Location: [id number] : 1940  Attending: Michelle Faulkner MD Date of Service: 8/10/2020  PCP: Elena Eden MD     Diagnoses:  [address]    Assessment/Plan:  [de-identified] yo M with AMS, respiratory failure, GH and renal failure. MALINDA with no hydro and decompressed bladder. Critically ill in ICU    - Cont ruiz,  Irrigate q shift  - Consider cysto when clinically improved        Subjective:   Dez Dailey is a [de-identified] y.o. male. He was seen and examined this morning.  Critically ill in ICU    Objective:   Vitals:  Vitals:    08/10/20 0700   BP: 106/62   Pulse: 131   Resp: (!) 36   Temp:        Intake/Output Summary (Last 24 hours) at 8/10/2020 0817  Last data filed at 8/10/2020 0700  Gross per 24 hour   Intake 3906.9 ml   Output 1347 ml   Net 2559.9 ml     Physical Exam:  Gen: intubated and sedated  Ruiz concentrated cloudy urine, no blood in tubing    Labs:  Lab Results   Component Value Date    WBC 38.7 (H) 08/10/2020    HGB 11.3 (L) 08/10/2020    HCT 35.6 (L) 08/10/2020    MCV 87.7 08/10/2020    PLT 60 (L) 08/10/2020     Lab Results   Component Value Date    CREATININE 1.9 (H) 08/10/2020    BUN 26 (H) 08/10/2020     (L) 08/10/2020    K 4.5 08/10/2020    CL 94 (L) 08/10/2020    CO2 15 (L) 08/10/2020       Naif Crooks MD  8/10/2020

## 2020-08-10 NOTE — PLAN OF CARE
Nutrition Problem #1: Inadequate oral intake  Intervention: Food and/or Nutrient Delivery: Continue NPO  Nutritional Goals: Initiation of nutrition as appropriate based on hemodynamic status

## 2020-08-10 NOTE — PROGRESS NOTES
Patient unresponsive throughout shift with max dose of pressors in use and intolerance of CRRT. Called family in for end of life discussion as evidence of eminent death became apparent. Family agreed upon comfort care after discussion with Dr. Maria Guadalupe Flower care RN,Shannon. Patient  peacefully and was pronounced at 1854. Call placed to Hospitalist,Nephrologist,family,and [facility]. See post mortem flowsheet. Report given to Shima Boudreaux to call [address]  home when patient is released to [facility]. Await call back from [facility].

## 2020-08-10 NOTE — PROGRESS NOTES
Office : [phone]     Fax :[phone]       Nephrology  Note        Chief Complaint:    Chief Complaint   Patient presents with    Fall     pt d/c from inpt unit at 200 today. pt is coming in from [facility] via [address]. Pt fell out of bed is bleeding from penis where his ruiz catheter was . nursing at facility states he hasn't been acting right since he arrived today 08/07/2020 at 1900         History of Present Ilness:    Anastacia Cavazos is a [de-identified] y.o. male with past medical history diabetes type 2, CVA, essential hypertension, hyperlipidemia, CAD, who was recently discharged from hospital on 8/7/2020 following admission for hallucination, had extensive work-up with no significant etiology noted (suspected to have had some seizure with postictal symptoms). He was discharged to [facility], where he was sent back to the emergency room for evaluation for increasing confusion. It was also reported that patient fell at the nursing home, sustained a large hematoma to right hand. On arrival in the emergency room, he was also noted to have hematuria, likely from traumatic pull of his Ruiz catheter. Patient appears to be confused and unable to provide any relevant information.     His creatinine is elevated at 1.9,  compared to baseline 1.0 prior to discharge. He has elevated lactic acid level of 7.3. Urinalysis is abnormal.      He is hypotensive systolic blood pressure in 60s.   Started on Levophed    Interval hx     Overnight condition worsened   Intubated on vent support   On levophed , vasopressin,   UOP - anuric   K is better 4.5 and latest 3.5   BUN and creat better with CRRT         Past Medical History:   Diagnosis Date    A-fib (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    Type II or unspecified type diabetes mellitus without mention of complication, not stated as uncontrolled     Unspecified sleep apnea        Past Surgical History:   Procedure Laterality Date    COLONOSCOPY      EYE SURGERY      INTUBATION  8/8/2020         VASECTOMY  1974       Family History   Problem Relation Age of Onset    Diabetes Mother     Cancer Mother     Emphysema Father     Heart Disease Brother          Current Medications:    EPINEPHrine (EPINEPHrine HCL) 5 mg in dextrose 5 % 250 mL infusion, Continuous  norepinephrine (LEVOPHED) 32 mg in dextrose 5 % 250 mL infusion, Continuous  calcium chloride 8 g in sodium chloride 0.9 % 1,000 mL infusion, Continuous  prismaSATE BGK 4/2.5 dialysis solution, Continuous  prismaSol BGK 4/2.5 dialysis solution, Continuous  dextrose 50 % IV solution, Once  vancomycin (VANCOCIN) intermittent dosing (placeholder), RX Placeholder  potassium chloride 20 mEq/50 mL IVPB (Central Line), PRN  magnesium sulfate 1 g in dextrose 5% 100 mL IVPB, PRN  calcium gluconate 1 g in sodium chloride 50 mL, PRN    Or  calcium gluconate 2 g in sodium chloride 100 mL, PRN    Or  calcium gluconate 3 g in dextrose 5 % 100 mL IVPB, PRN    Or  calcium gluconate 4 g in dextrose 5 % 100 mL IVPB, PRN  sodium phosphate 6 mmol in dextrose 5 % 250 mL IVPB, PRN    Or  sodium phosphate 12 mmol in dextrose 5 % 250 mL IVPB, PRN    Or  sodium phosphate 18 mmol in dextrose 5 % 500 mL IVPB, PRN    Or  sodium phosphate 24 mmol in dextrose 5 % 500 mL IVPB, PRN  hydrocortisone sodium succinate PF (SOLU-CORTEF) injection 50 mg, Q6H  midazolam (VERSED) 100 mg in dextrose 5 % 100 mL infusion, Continuous  midazolam (VERSED) injection 2 mg, Q1H PRN  vasopressin 20 Units in dextrose 5 % 100 mL infusion, Continuous  phenylephrine (ROE-SYNEPHRINE) 50 mg in dextrose 5 % 250 mL infusion, Continuous  insulin regular (HUMULIN R;NOVOLIN R) 100 Units in sodium chloride 0.9 % 100 mL infusion, Continuous  glucose (GLUTOSE) 40 % oral gel 15 g, PRN  dextrose 50 % IV solution, PRN  glucagon (rDNA) injection 1 mg, PRN  dextrose 5 % solution, PRN  sodium bicarbonate 150 mEq in dextrose 5 % 1,000 mL infusion, Continuous  latanoprost (XALATAN) 0.005 % ophthalmic solution 1 drop, Nightly  dextrose 50 % IV solution, PRN  dextrose 5 % solution, PRN  sodium chloride flush 0.9 % injection 10 mL, 2 times per day  sodium chloride flush 0.9 % injection 10 mL, PRN  acetaminophen (TYLENOL) tablet 650 mg, Q6H PRN    Or  acetaminophen (TYLENOL) suppository 650 mg, Q6H PRN  brimonidine (ALPHAGAN) 0.2 % ophthalmic solution 1 drop, Daily    And  timolol (TIMOPTIC) 0.5 % ophthalmic solution 1 drop, Daily  sodium chloride flush 0.9 % injection 10 mL, 2 times per day  sodium chloride flush 0.9 % injection 10 mL, PRN  chlorhexidine (PERIDEX) 0.12 % solution 15 mL, BID  fentaNYL (SUBLIMAZE) 1,000 mcg in sodium chloride 0.9 % 100 mL infusion, Continuous  pantoprazole (PROTONIX) injection 40 mg, Daily  meropenem (MERREM) 1 g in sodium chloride 0.9 % 100 mL IVPB (mini-bag), Q12H        Physical exam:       Vitals:  /62   Pulse 131   Temp 94.9 °F (34.9 °C) (Temporal)   Resp (!) 36   Ht 5' 6\" (1.676 m)   Wt 189 lb 9.5 oz (86 kg)   SpO2 (!) 70%   BMI 30.60 kg/m²   Constitutional: intubated on fio2 100 %   Skin: no rash, turgor wnl  Heent:  eomi, mmm  Neck: no bruits or jvd noted  Cardiovascular:  S1, S2 without m/r/g  Respiratory: decrease air entry   Abdomen:  +bs, soft, nt, nd  Ext: No lower extremity edema    Labs:  CBC:   Recent Labs     08/08/20  0020  08/09/20  0636 08/10/20  0322 08/10/20  0812   WBC 4.4  --  39.5* 38.7*  --    HGB 13.1*   < > 12.5* 11.3* 11.3*   *  --  84* 60*  --     < > = values in this interval not displayed. BMP:    Recent Labs     08/09/20  1830 08/09/20  2104 08/10/20  0322   * 129* 130*   K 7.0* 7.0* 4.5   CL 99 96* 94*   CO2 14* 13* 15*   BUN 49* 37* 26*   CREATININE 3.3* 2.5* 1.9*   GLUCOSE 277* 282* 281*     Ca/Mg/Phos:   Recent Labs     08/09/20  1830 08/09/20  2104 08/09/20  2134 08/10/20  0322   CALCIUM 6.2* 5.4*  --  5.1*   MG 2.10  --  2.10 1.80   PHOS 8.1*  --  5.9* 5.9*     Hepatic:   Recent Labs     08/09/20  0636 08/09/20  1830 08/10/20  0322   AST 82* 257* 652*   ALT 74* 190* 539*   BILITOT 0.9 1.1* 1.3*   ALKPHOS 130* 211* 264*     Troponin: No results for input(s): TROPONINI in the last 72 hours. BNP: No results for input(s): BNP in the last 72 hours. Lipids:   Recent Labs     08/10/20  0322   TRIG 481*     ABGs:   Recent Labs     08/09/20  0632   PHART 7.214*   PO2ART 142.0*   ARC6VAR 31.4*     INR:   Recent Labs     08/08/20  0020   INR 1.22*     UA:  Recent Labs     08/08/20  0120   COLORU RED*   CLARITYU TURBID*   GLUCOSEU 500*   BILIRUBINUR SMALL*   KETUA TRACE*   SPECGRAV 1.015   BLOODU LARGE*   PHUR 5.5   PROTEINU >=300*   UROBILINOGEN 1.0   NITRU POSITIVE*   LEUKOCYTESUR TRACE*   LABMICR YES   URINETYPE NotGiven      Urine Microscopic:   Recent Labs     08/08/20  0120   HYALCAST 3   WBCUA 29*   RBCUA >100*   EPIU 2     Urine Culture:   Recent Labs     08/08/20  0229   LABURIN >100,000 CFU/ml     Urine Chemistry: No results for input(s): CLUR, LABCREA, PROTEINUR, NAUR in the last 72 hours. IMAGING:  XR CHEST PORTABLE   Final Result   Improving multifocal edema and/or superimposed pneumonia. XR CHEST PORTABLE   Final Result   1. Interval right internal jugular Vas-Cath placement, in proper position,   without evidence of a pneumothorax. 2. Stable multifocal airspace disease, likely a combination of asymmetric   pulmonary edema and multifocal pneumonia. XR CHEST PORTABLE   Final Result   1. Stable appropriate positions of support apparatus.    2. Interval improvement in appearance of multifocal airspace opacities,   likely representing a combination of improving pulmonary edema and multifocal   pneumonia. XR CHEST PORTABLE   Final Result   Endotracheal tube in good position. New right greater than left airspace disease over the past 2 hours consistent   with developing edema. Superimposed pneumonia is considered. US RENAL COMPLETE   Final Result   1. No acute abnormality. XR CHEST PORTABLE   Final Result   1. Slight interval progression of mild right basilar atelectasis. 2. Stable mild elevation of the right hemidiaphragm. CT Cervical Spine WO Contrast   Final Result   No acute abnormality of the cervical spine. CT Head WO Contrast   Final Result   No CT evidence of acute infarct. Findings similar in comparison to the recent brain MRI and CT which   demonstrate white matter edema in the left temporal lobe and insula. Differential diagnosis includes herpes encephalitis, limbic encephalitis, low   grade neoplasm. XR CHEST PORTABLE   Final Result   No acute finding or significant change. I/O last 3 completed shifts: In: 3906.9 [I.V.:3906.9]  Out: 2793 [Urine:22]          Assessment/Plan :      1.  DENZEL likely has ATN from septic shock  Maintain systolic blood pressure greater than 90. Condition worsened overnight . On 4 pressors now   Anuric   On CRRT. Able to tolerate at this time but unable to do any ultrafiltration because of hypotension   worsening acidosis 2/2 septic shock   Continue stress dose hydrocortisone   Because of hypotension decreased the BF rate to 150 ml/ min      2. High anion gap metabolic acidosis secondary to DENZEL and has lactic acidosis  Continue CRRT. On bicarb drip     4. Septic shock 2/2 UTI + pneumonia. Has klebsiella in [facility]   On meropenem and vanc     5. Hyperkalemia- corrected with CRRT.    Change dialysate to 4/2.5 now sice potassium is better Ionized calcium low. Continue calcium gluconate drip for now   Once ionized calcium above 1 then will stop drip     D/w RN at bedside and ICU team   Called his son and updated that his condition is critical. He wants to continue to do what is possible but no CPR. Will continue CRRT till he can tolerate       Critical Care   Due to the high probability of clinically significant life threating deterioration of the patient's condition that required my urgent intervention, a total critical care time of >35 minutes was used. This time excludes any time that may have been spent performing procedures. This includes but not limited to vital sign monitoring, telemetry monitoring, continuous pulse oximety, IV medication, clinical response to the IV medications, documentation time , consultation time, interpretation of lab data, review of nursing notes and old record review. All questions and concerns were addressed to the patient/family. Alternatives to my treatment were discussed. The note was completed using EMR. Every effort was made to ensure accuracy; however, inadvertent computerized transcription errors may be present.             Thank you for allowing us to participate in care of Marten Goldberg       Electronically signed by: Tanesha Klein MD, 8/10/2020, 8:36 AM    [facility] [address]  Office : [phone]  Fax :[phone] Keystone Flap Text: The defect edges were debeveled with a #15 scalpel blade.  Given the location of the defect, shape of the defect a keystone flap was deemed most appropriate.  Using a sterile surgical marker, an appropriate keystone flap was drawn incorporating the defect, outlining the appropriate donor tissue and placing the expected incisions within the relaxed skin tension lines where possible. The area thus outlined was incised deep to adipose tissue with a #15 scalpel blade.  The skin margins were undermined to an appropriate distance in all directions around the primary defect and laterally outward around the flap utilizing iris scissors.

## 2020-08-10 NOTE — PROGRESS NOTES
Hospitalist Progress Note      PCP: Dirk Ortiz MD    Date of Admission: 8/7/2020    Chief Complaint: [facility], [facility] Course: ***     Subjective: ***       Medications:  Reviewed    Infusion Medications    dextrose      dextrose       Scheduled Medications    dextrose  50 mL Intravenous Once    sodium chloride flush  10 mL Intravenous 2 times per day    sodium chloride flush  10 mL Intravenous 2 times per day    pantoprazole  40 mg Intravenous Daily     PRN Meds: glucose, dextrose, glucagon (rDNA), dextrose, dextrose, dextrose, sodium chloride flush, acetaminophen **OR** acetaminophen, sodium chloride flush      Intake/Output Summary (Last 24 hours) at 8/10/2020 1813  Last data filed at 8/10/2020 0800  Gross per 24 hour   Intake 2670.9 ml   Output 1424 ml   Net 1246.9 ml       Physical Exam Performed:    BP (!) 71/35   Pulse 84   Temp 93.6 °F (34.2 °C) (Axillary)   Resp 19   Ht 5' 6\" (1.676 m)   Wt 189 lb 9.5 oz (86 kg)   SpO2 (!) 70%   BMI 30.60 kg/m²     General appearance: No apparent distress, appears stated age and cooperative. HEENT: Pupils equal, round, and reactive to light. Conjunctivae/corneas clear. Neck: Supple, with full range of motion. No jugular venous distention. Trachea midline. Respiratory:  Normal respiratory effort. Clear to auscultation, bilaterally without Rales/Wheezes/Rhonchi. Cardiovascular: Regular rate and rhythm with normal S1/S2 without murmurs, rubs or gallops. Abdomen: Soft, non-tender, non-distended with normal bowel sounds. Musculoskeletal: No clubbing, cyanosis or edema bilaterally. Full range of motion without deformity. Skin: Skin color, texture, turgor normal.  No rashes or lesions. Neurologic:  Neurovascularly intact without any focal sensory/motor deficits.  Cranial nerves: II-XII intact, grossly non-focal.  Psychiatric: Alert and oriented, thought content appropriate, normal insight  Capillary Refill: Brisk,< 3 seconds   Peripheral Pulses: +2 airspace disease over the past 2 hours consistent   with developing edema. Superimposed pneumonia is considered. US RENAL COMPLETE   Final Result   1. No acute abnormality. XR CHEST PORTABLE   Final Result   1. Slight interval progression of mild right basilar atelectasis. 2. Stable mild elevation of the right hemidiaphragm. CT Cervical Spine WO Contrast   Final Result   No acute abnormality of the cervical spine. CT Head WO Contrast   Final Result   No CT evidence of acute infarct. Findings similar in comparison to the recent brain MRI and CT which   demonstrate white matter edema in the left temporal lobe and insula. Differential diagnosis includes herpes encephalitis, limbic encephalitis, low   grade neoplasm. XR CHEST PORTABLE   Final Result   No acute finding or significant change.                  Assessment/Plan:    Active Hospital Problems    Diagnosis    Urinary tract infection due to Klebsiella species [N39.0, M67.7]    Complicated UTI (urinary tract infection) [N39.0]    Severe sepsis (HCC) [A41.9, R65.20]    Septic shock ([address] [A41.9, R65.21]    Acute respiratory failure with hypoxia ([address] [J96.01]    Pneumonia of right lower lobe due to infectious organism ([address] [J18.1]         DVT Prophylaxis: ***  Diet: Diet NPO Effective Now Exceptions are: Sips with Meds  Code Status: DNR-CC    PT/OT Eval Status: ***    Dispo - ***    Lewis Clark MD

## 2020-08-10 NOTE — CONSULTS
CHEST 8/3/2020    FINDINGS: There is no acute consolidation or effusion. There is a stable left lung granuloma. There is no pneumothorax. The mediastinal structures are unremarkable. The upper abdomen is unremarkable. The extrathoracic soft tissues are unremarkable. There is no acute osseous abnormality.      No acute cardiopulmonary process.        MRI OF THE BRAIN WITHOUT CONTRAST  8/4/2020    FINDINGS: INTRACRANIAL STRUCTURES/VENTRICLES: There is no diffusion restriction to suggest acute infarct. There is abnormal confluent mildly expansile T2/FLAIR hyperintensity predominantly involving the anterior left temporal lobe extending into the right uncus and hippocampus, posteroinferior frontal lobes, and also likely involving the right uncus and right hippocampus. Left choroid fissure cyst is incidentally noted. There are a few scattered punctate foci of T2/FLAIR hyperintensity in the cerebral white matter, nonspecific but generally ascribed to sequela of chronic microvascular ischemia. No mass effect or midline shift. No evidence of an acute intracranial hemorrhage. The ventricles and sulci are normal in size and configuration. The sellar/suprasellar regions appear unremarkable. The normal signal voids within the major intracranial vessels appear maintained. ORBITS: The visualized portion of the orbits demonstrate no acute abnormality. SINUSES: The visualized paranasal sinuses and mastoid air cells are well aerated. BONES/SOFT TISSUES: The bone marrow signal intensity appears normal. The soft tissues demonstrate no acute abnormality.      1. 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 Status epilepticus or infiltrative neoplasm are in the differential diagnosis but thought less likely. 2. No evidence of acute infarct.  3. Minimal chronic white matter microvascular ischemic changes.       8/5/2020  EXAMINATION: FLUOROSCOPIC GUIDED LUMBAR PUNCTURE  FLUOROSCOPY DOSE AND TYPE OR TIME AND EXPOSURES: 48 seconds fluoroscopy time was utilized for the study a single exposure obtained. PROCEDURE: :  Genny Stewart. Mis Razo MD Informed consent was obtained after the risks and benefits of the procedure were discussed with the patient and all questions were answered fully. [place] protocol was observed and a standard timeout was performed. The patient was positioned prone and the back was prepped and draped in the normal sterile fashion. 1% lidocaine was used for local anesthesia. The subarachnoid space was accessed with a 20-gauge 3.5 \"spinal needle at the L3-L4 level. Free flow of clear CSF was noted. Approximately 12 ml of CSF was removed and sent for analysis. The stylet was reinserted, spinal needle was removed and brief pressure was applied at the puncture site. There were no immediate complications and the patient tolerated the procedure well.      Successful fluoroscopic-guided lumbar puncture. Past Medical History:   has a past medical history of A-fib ([facility] [address] Acute encephalopathy, Acute kidney injury ([facility] [address] Altered mental status, Colon polyp, Glaucoma, HTN (hypertension), Hyperlipidemia, Hypertension, Osteoarthritis, S/P CABG x 4, Type II or unspecified type diabetes mellitus without mention of complication, not stated as uncontrolled, and Unspecified sleep apnea. Past Surgical History:   has a past surgical history that includes Vasectomy (1974); eye surgery; Colonoscopy; and intubation (8/8/2020). Advance Directives:      Code status is DNR CCA    Problem Severity: Pain/Other Symptoms:          Bed Mobility/Toileting/Transfer:          Performance Status:          Symptom Assessment: Appetite/Nausea/Bowels/Fatigue:          Social History:   reports that he quit smoking about 39 years ago. His smoking use included cigarettes.  He has a 50.00 pack-year smoking history. He has quit using smokeless tobacco. He reports that he does not drink alcohol or use drugs. Family History:  family history includes Cancer in his mother; Diabetes in his mother; Emphysema in his father; Heart Disease in his brother. Psychological/Spiritual:       Patient has been receiving rehab at [facility]. His wife is . Has two sons. Lives with his son Isaura Reveles. Family Discussion:      Met with sons at bedside. Patient with cyanosis of hands and feet, Mottling of extremities noted. Reviewed clinical status in detail. Sons are aware patient's illness is not survivable. Son Isaura Reveles states patient was very independent at home and an active . Sons are grieving this sudden and unexpected illness. Sons agree to DNR CC, no further escalation of care. Vasopressors will not be replaced once each has been completely infused. Sons agree to comfort care and plan to stay with patient. Declined  support. Offered support.

## 2020-08-11 LAB — MRSA SCREEN RT-PCR: NORMAL

## 2020-08-11 NOTE — PROGRESS NOTES
Per , Royce Ocampo has released pt. Transport present to transport to [facility]. Yvonne Ellis has been notified.

## 2020-08-12 LAB
CSF CULTURE: NORMAL
GRAM STAIN RESULT: NORMAL

## 2020-08-13 NOTE — PROGRESS NOTES
Physician Progress Note      PATIENT:               Tasneem Hernandez  CSN #:                  [id number]  :                       1940  ADMIT DATE:       2020 11:50 PM  [address] [place] DATE:        8/10/2020 9:23 PM  RESPONDING  PROVIDER #:        Jer Ortiz MD          QUERY TEXT:    Patient admitted with Sepsis due to UTI, noted to have an indwelling ruiz   catheter. If possible, please document in the progress notes and discharge   summary if you are evaluating and / or treating any of the following: The medical record reflects the following:  Risk Factors: brought in from nursing home with hematuria from penis after   falling out of bed and pulling on his indwelling ruiz catheter. Clinical Indicators: Presents with indwelling ruiz catheter from ECF; Abnormal U/A; Dx with UTI and Sepsis  Treatment: Urology consult, U/A, urine sent for culture, IV Merrem  Options provided:  -- Sepsis and UTI related to indwelling ruiz catheter  -- Sepsis and UTI unrelated to indwelling ruiz catheter  -- Other - I will add my own diagnosis  -- Disagree - Not applicable / Not valid  -- Disagree - Clinically unable to determine / Unknown  -- Refer to Clinical Documentation Reviewer    PROVIDER RESPONSE TEXT:    This patient has sepsis and UTI related to indwelling ruiz catheter.     Query created by: Burgess Kuhn on 8/10/2020 12:42 PM      Electronically signed by:  Jer Ortiz MD 2020 4:43 PM

## 2020-08-19 LAB — REPORT: NORMAL

## 2022-12-08 NOTE — PROGRESS NOTES
No answer from nephrology at this time, paged the nephrologist through the call center this time. Otolaryngologist Procedure Text (A): After obtaining clear surgical margins the patient was sent to otolaryngology for surgical repair.  The patient understands they will receive post-surgical care and follow-up from the referring physician's office.
